# Patient Record
Sex: FEMALE | Race: WHITE | NOT HISPANIC OR LATINO | Employment: FULL TIME | URBAN - NONMETROPOLITAN AREA
[De-identification: names, ages, dates, MRNs, and addresses within clinical notes are randomized per-mention and may not be internally consistent; named-entity substitution may affect disease eponyms.]

---

## 2017-01-18 ENCOUNTER — LAB REQUISITION (OUTPATIENT)
Dept: LAB | Facility: HOSPITAL | Age: 57
End: 2017-01-18

## 2017-01-18 DIAGNOSIS — Z12.4 ENCOUNTER FOR SCREENING FOR MALIGNANT NEOPLASM OF CERVIX: ICD-10-CM

## 2017-01-18 PROCEDURE — G0123 SCREEN CERV/VAG THIN LAYER: HCPCS | Performed by: INTERNAL MEDICINE

## 2017-01-18 PROCEDURE — 87624 HPV HI-RISK TYP POOLED RSLT: CPT | Performed by: INTERNAL MEDICINE

## 2017-01-25 LAB
GEN CATEG CVX/VAG CYTO-IMP: NORMAL
LAB AP CASE REPORT: NORMAL
LAB AP GYN ADDITIONAL INFORMATION: NORMAL
Lab: NORMAL
PATH INTERP SPEC-IMP: NORMAL
STAT OF ADQ CVX/VAG CYTO-IMP: NORMAL

## 2017-03-07 ENCOUNTER — OFFICE VISIT (OUTPATIENT)
Dept: GASTROENTEROLOGY | Facility: CLINIC | Age: 57
End: 2017-03-07

## 2017-03-07 VITALS
WEIGHT: 168 LBS | HEART RATE: 97 BPM | DIASTOLIC BLOOD PRESSURE: 70 MMHG | TEMPERATURE: 98.2 F | SYSTOLIC BLOOD PRESSURE: 102 MMHG | OXYGEN SATURATION: 98 % | BODY MASS INDEX: 27.99 KG/M2 | HEIGHT: 65 IN

## 2017-03-07 DIAGNOSIS — R13.19 OTHER DYSPHAGIA: Primary | ICD-10-CM

## 2017-03-07 DIAGNOSIS — K30 INDIGESTION: ICD-10-CM

## 2017-03-07 PROBLEM — R13.10 DYSPHAGIA: Status: ACTIVE | Noted: 2017-03-07

## 2017-03-07 PROCEDURE — 99214 OFFICE O/P EST MOD 30 MIN: CPT | Performed by: NURSE PRACTITIONER

## 2017-03-07 RX ORDER — PANTOPRAZOLE SODIUM 40 MG/1
40 TABLET, DELAYED RELEASE ORAL DAILY
Qty: 30 TABLET | Refills: 11 | Status: SHIPPED | OUTPATIENT
Start: 2017-03-07 | End: 2018-03-19 | Stop reason: SDUPTHER

## 2017-03-07 RX ORDER — FLUOXETINE HYDROCHLORIDE 40 MG/1
40 CAPSULE ORAL DAILY
COMMUNITY

## 2017-03-07 NOTE — PROGRESS NOTES
Chief Complaint:   Chief Complaint   Patient presents with   • Difficulty Swallowing     Patient us here today with difficulty swallowing.         Patient ID: Carly Burkett is a 56 y.o. female     History of Present Illness:    This is a very pleasant 56-year-old female who comes today with referral by Dr. Nydia Steve with complaints of difficulty swallowing.  The patient states that this problem has been occurring off and on for over a year however it seems to have worsened recently.  She states that it occurs with any type of food as well as liquids.  She states there are no relieving factors other than to wait for the food or liquid to pass downward.  She states that she cannot say that there are any other exacerbating factors.  She does state that she has recently begun to have indigestion.  She states that she feels some burning sensation up into her esophagus and takes over-the-counter Tums for relief.  She states that there are no exacerbating factors such as food or liquid in this condition can occur at any given time.    The patient denies any nausea, vomiting, epigastric pain, hematemesis or coffee ground like vomitus.  She denies any fever or chills.  She denies any unintentional weight loss or loss of appetite.  She denies any bright red blood per rectum or black tarry stools.  She denies any nausea or vomiting.        Past Medical History   Diagnosis Date   • Anxiety    • GERD (gastroesophageal reflux disease)    • Hyperlipidemia    • Hypertension        Past Surgical History   Procedure Laterality Date   • Tonsillectomy     • Breast biopsy     • Oophorectomy     • Colonoscopy  05/14/2015         Current Outpatient Prescriptions:   •  aspirin 81 MG EC tablet, Take 81 mg by mouth Daily., Disp: , Rfl:   •  cholecalciferol (VITAMIN D3) 1000 UNITS tablet, Take 1,000 Units by mouth Daily., Disp: , Rfl:   •  FLUoxetine (PROzac) 40 MG capsule, Take 40 mg by mouth Daily., Disp: , Rfl:   •   "HYDROcodone-acetaminophen (NORCO) 7.5-325 MG per tablet, Take 1 tablet by mouth Every 6 (Six) Hours As Needed for moderate pain (4-6)., Disp: , Rfl:   •  metoprolol succinate XL (TOPROL-XL) 50 MG 24 hr tablet, Take 50 mg by mouth Daily., Disp: , Rfl:   •  simvastatin (ZOCOR) 40 MG tablet, Take 40 mg by mouth Every Night., Disp: , Rfl:   •  triamterene-hydrochlorothiazide (DYAZIDE) 37.5-25 MG per capsule, Take 1 capsule by mouth Every Morning., Disp: , Rfl:   •  zolpidem (AMBIEN) 10 MG tablet, Take 10 mg by mouth At Night As Needed for sleep., Disp: , Rfl:   •  pantoprazole (PROTONIX) 40 MG EC tablet, Take 1 tablet by mouth Daily., Disp: 30 tablet, Rfl: 11    Allergies   Allergen Reactions   • Morphine And Related        Social History     Social History   • Marital status: Single     Spouse name: N/A   • Number of children: N/A   • Years of education: N/A     Occupational History   • Not on file.     Social History Main Topics   • Smoking status: Never Smoker   • Smokeless tobacco: Not on file   • Alcohol use Yes   • Drug use: Not on file   • Sexual activity: Not on file     Other Topics Concern   • Not on file     Social History Narrative       Family History   Problem Relation Age of Onset   • Rectal cancer Sister        Vitals:    03/07/17 1259   BP: 102/70   Pulse: 97   Temp: 98.2 °F (36.8 °C)   SpO2: 98%   Weight: 168 lb (76.2 kg)   Height: 65\" (165.1 cm)       Review of Systems:    General:    Present -feeling well   Skin:    Not Present-Rash   HEENT:     Not Present-Acute visual changes or Acute hearing changes   Neck :    Not Present- swollen glands   Genitourinary:      Not Present- burning, frequency, urgency hematuria, dysuria,   Cardiovascular:   Not Present-chest pain, palpitations, or pressure   Respiratory:   Not Present- shortness of breath or cough   Gastrointestinal:  Musculoskeletal:  Neurological:  Psychiatric:   Present as mentioned in the HP    Not Present. Recent gait disturbances.    Not " Present-Seizures and weakness in extremities.    Not Present- Anxiety or Depression.       Physical Exam:    General Appearance:    Alert, cooperative, in no acute distress   Psych:    Mood appropriate    Eyes:          conjunctivae and sclerae normal, no   icterus, no pallor   ENMT:    Ears appear intact with no abnormalities noted oral mucosa moist   Neck:   No adenopathy, supple, trachea midline, no thyromegaly, no   carotid bruit, no JVD    Cardiovascular:    Regular rhythm and normal rate, normal S1 and S2, no            murmur, no gallop, no rub, no click   Gastrointestinal:     Inspection normal.  Normal bowel sounds, no masses, no organomegaly, soft round non-tender, non-distended, no guarding, no rebound or tenderness. No hepatosplenomegaly.   Skin:   No bleeding, bruising or rash   Neurologic:   nonfocal       No results found for: WBC, HGB, HCT, PLT     No results found for: NA, K, CL, CO2, BUN, CREATININE, BILITOT, ALKPHOS, ALT, AST, GLUCOSE    No results found for: INR    Assessment and Plan:    Carly was seen today for difficulty swallowing.    Diagnoses and all orders for this visit:    Other dysphagia  -     Case request endoscopy    Indigestion  -     Case request    Other orders  -     pantoprazole (PROTONIX) 40 MG EC tablet; Take 1 tablet by mouth Daily.    I have scheduled the patient for an endoscopy and I will initiate Protonix 40 mg once a day.    There are no Patient Instructions on file for this visit.    Next follow-up appointment      The risks, benefits, and alternatives of endoscopy were reviewed with the patient today.  Risks including perforation, with or without dilation, possibly requiring surgery.  Additional risks include risk of bleeding.  There is also the risk of a drug reaction or problems with anesthesia.  This will be discussed with the further by the anesthesia team on the day of the procedure. The benefits include the diagnosis and management of disease of the upper  digestive tract.  Alternatives to endoscopy include upper GI series, laboratory testing, radiographic evaluation, or no intervention.  The patient verbalizes understanding and agrees.      EMR Dragon/Transcription disclaimer:  Much of this encounter note is an electronic transcription/translation of spoken language to printed text. The electronic translation of spoken language may permit erroneous, or at times, nonsensical words or phrases to be inadvertently transcribed; although I have reviewed the note for such errors, some may still exist.

## 2017-04-10 ENCOUNTER — TELEPHONE (OUTPATIENT)
Dept: GASTROENTEROLOGY | Facility: CLINIC | Age: 57
End: 2017-04-10

## 2017-07-17 RX ORDER — HYDROCODONE BITARTRATE AND ACETAMINOPHEN 7.5; 325 MG/1; MG/1
1 TABLET ORAL DAILY PRN
Qty: 30 TABLET | Refills: 0 | Status: SHIPPED | OUTPATIENT
Start: 2017-07-17 | End: 2017-08-16

## 2017-07-17 RX ORDER — ZOLPIDEM TARTRATE 10 MG/1
10 TABLET ORAL NIGHTLY PRN
Qty: 30 TABLET | Refills: 0 | Status: SHIPPED | OUTPATIENT
Start: 2017-07-17 | End: 2017-07-31

## 2017-08-07 RX ORDER — TRIAMTERENE AND HYDROCHLOROTHIAZIDE 37.5; 25 MG/1; MG/1
CAPSULE ORAL
Qty: 90 CAPSULE | Refills: 2 | Status: SHIPPED | OUTPATIENT
Start: 2017-08-07 | End: 2017-08-08 | Stop reason: SDUPTHER

## 2017-08-08 RX ORDER — TRIAMTERENE AND HYDROCHLOROTHIAZIDE 37.5; 25 MG/1; MG/1
CAPSULE ORAL
Qty: 90 CAPSULE | Refills: 2 | Status: SHIPPED | OUTPATIENT
Start: 2017-08-08 | End: 2018-08-01

## 2017-09-10 PROBLEM — Z12.4 PAP SMEAR FOR CERVICAL CANCER SCREENING: Status: ACTIVE | Noted: 2017-09-10

## 2017-09-10 PROBLEM — M81.6 LOCALIZED OSTEOPOROSIS WITHOUT CURRENT PATHOLOGICAL FRACTURE: Status: ACTIVE | Noted: 2017-09-10

## 2017-09-10 PROBLEM — E55.9 VITAMIN D DEFICIENCY: Status: ACTIVE | Noted: 2017-09-10

## 2017-09-10 PROBLEM — F51.01 PRIMARY INSOMNIA: Status: ACTIVE | Noted: 2017-09-10

## 2017-09-10 PROBLEM — E78.00 PURE HYPERCHOLESTEROLEMIA: Status: ACTIVE | Noted: 2017-09-10

## 2017-09-10 PROBLEM — Z12.12 SCREENING FOR COLORECTAL CANCER: Status: ACTIVE | Noted: 2017-09-10

## 2017-09-10 PROBLEM — M06.9 RA (RHEUMATOID ARTHRITIS) (HCC): Status: ACTIVE | Noted: 2017-09-10

## 2017-09-10 PROBLEM — F32.9 REACTIVE DEPRESSION: Status: ACTIVE | Noted: 2017-09-10

## 2017-09-10 PROBLEM — Z12.11 SCREENING FOR COLORECTAL CANCER: Status: ACTIVE | Noted: 2017-09-10

## 2017-09-10 PROBLEM — F41.1 GENERALIZED ANXIETY DISORDER: Status: ACTIVE | Noted: 2017-09-10

## 2017-09-10 PROBLEM — I10 ESSENTIAL HYPERTENSION: Status: ACTIVE | Noted: 2017-09-10

## 2017-09-12 DIAGNOSIS — E55.9 VITAMIN D DEFICIENCY: ICD-10-CM

## 2017-09-12 DIAGNOSIS — I10 ESSENTIAL HYPERTENSION: ICD-10-CM

## 2017-09-12 DIAGNOSIS — E78.00 PURE HYPERCHOLESTEROLEMIA: Primary | ICD-10-CM

## 2017-09-20 RX ORDER — CHOLECALCIFEROL (VITAMIN D3) 1250 MCG
1 CAPSULE ORAL WEEKLY
COMMUNITY
End: 2018-06-13 | Stop reason: CLARIF

## 2017-09-20 RX ORDER — FLUOXETINE HYDROCHLORIDE 40 MG/1
CAPSULE ORAL
COMMUNITY
Start: 2017-06-25 | End: 2018-01-22 | Stop reason: SDUPTHER

## 2017-09-20 RX ORDER — ZOLPIDEM TARTRATE 10 MG/1
TABLET ORAL NIGHTLY PRN
COMMUNITY
End: 2018-08-01 | Stop reason: SDUPTHER

## 2017-09-20 RX ORDER — METOPROLOL SUCCINATE 50 MG/1
50 TABLET, EXTENDED RELEASE ORAL DAILY
COMMUNITY
Start: 2017-09-08 | End: 2018-01-25 | Stop reason: SDUPTHER

## 2017-09-21 ENCOUNTER — OFFICE VISIT (OUTPATIENT)
Dept: INTERNAL MEDICINE | Age: 57
End: 2017-09-21
Payer: COMMERCIAL

## 2017-09-21 VITALS
OXYGEN SATURATION: 97 % | HEART RATE: 63 BPM | DIASTOLIC BLOOD PRESSURE: 82 MMHG | WEIGHT: 174 LBS | SYSTOLIC BLOOD PRESSURE: 132 MMHG | HEIGHT: 65 IN | BODY MASS INDEX: 28.99 KG/M2

## 2017-09-21 DIAGNOSIS — Z11.59 ENCOUNTER FOR HEPATITIS C SCREENING TEST FOR LOW RISK PATIENT: ICD-10-CM

## 2017-09-21 DIAGNOSIS — I10 ESSENTIAL HYPERTENSION: ICD-10-CM

## 2017-09-21 DIAGNOSIS — E55.9 VITAMIN D DEFICIENCY: ICD-10-CM

## 2017-09-21 DIAGNOSIS — F41.1 GENERALIZED ANXIETY DISORDER: ICD-10-CM

## 2017-09-21 DIAGNOSIS — E78.00 PURE HYPERCHOLESTEROLEMIA: ICD-10-CM

## 2017-09-21 DIAGNOSIS — I10 ESSENTIAL HYPERTENSION: Primary | ICD-10-CM

## 2017-09-21 DIAGNOSIS — Z11.4 SCREENING FOR HIV (HUMAN IMMUNODEFICIENCY VIRUS): ICD-10-CM

## 2017-09-21 DIAGNOSIS — H53.9 VISION CHANGES: ICD-10-CM

## 2017-09-21 DIAGNOSIS — R51.9 RIGHT TEMPORAL HEADACHE: ICD-10-CM

## 2017-09-21 LAB
ALBUMIN SERPL-MCNC: 4.3 G/DL (ref 3.5–5.2)
ALP BLD-CCNC: 117 U/L (ref 35–104)
ALT SERPL-CCNC: 30 U/L (ref 5–33)
ANION GAP SERPL CALCULATED.3IONS-SCNC: 13 MMOL/L (ref 7–19)
AST SERPL-CCNC: 32 U/L (ref 5–32)
BILIRUB SERPL-MCNC: 0.9 MG/DL (ref 0.2–1.2)
BUN BLDV-MCNC: 21 MG/DL (ref 6–20)
CALCIUM SERPL-MCNC: 9.5 MG/DL (ref 8.6–10)
CHLORIDE BLD-SCNC: 103 MMOL/L (ref 98–111)
CHOLESTEROL, TOTAL: 204 MG/DL (ref 160–199)
CO2: 25 MMOL/L (ref 22–29)
CREAT SERPL-MCNC: 0.8 MG/DL (ref 0.5–0.9)
GFR NON-AFRICAN AMERICAN: >60
GLUCOSE BLD-MCNC: 89 MG/DL (ref 74–109)
HCT VFR BLD CALC: 43.9 % (ref 37–47)
HDLC SERPL-MCNC: 59 MG/DL (ref 65–121)
HEMOGLOBIN: 14 G/DL (ref 12–16)
LDL CHOLESTEROL CALCULATED: 122 MG/DL
MCH RBC QN AUTO: 27.6 PG (ref 27–31)
MCHC RBC AUTO-ENTMCNC: 31.9 G/DL (ref 33–37)
MCV RBC AUTO: 86.4 FL (ref 81–99)
PDW BLD-RTO: 14.2 % (ref 11.5–14.5)
PLATELET # BLD: 276 K/UL (ref 130–400)
PMV BLD AUTO: 9.5 FL (ref 9.4–12.3)
POTASSIUM SERPL-SCNC: 4.1 MMOL/L (ref 3.5–5)
RAPID HIV 1&2: NORMAL
RBC # BLD: 5.08 M/UL (ref 4.2–5.4)
SODIUM BLD-SCNC: 141 MMOL/L (ref 136–145)
TOTAL PROTEIN: 7.2 G/DL (ref 6.6–8.7)
TRIGL SERPL-MCNC: 113 MG/DL (ref 150–199)
WBC # BLD: 6.5 K/UL (ref 4.8–10.8)

## 2017-09-21 PROCEDURE — 99214 OFFICE O/P EST MOD 30 MIN: CPT | Performed by: INTERNAL MEDICINE

## 2017-09-21 RX ORDER — PANTOPRAZOLE SODIUM 40 MG/1
40 TABLET, DELAYED RELEASE ORAL
COMMUNITY
Start: 2017-03-07 | End: 2018-12-28 | Stop reason: SDUPTHER

## 2017-09-21 ASSESSMENT — PATIENT HEALTH QUESTIONNAIRE - PHQ9
2. FEELING DOWN, DEPRESSED OR HOPELESS: 0
1. LITTLE INTEREST OR PLEASURE IN DOING THINGS: 0
SUM OF ALL RESPONSES TO PHQ9 QUESTIONS 1 & 2: 0
SUM OF ALL RESPONSES TO PHQ QUESTIONS 1-9: 0

## 2017-09-21 ASSESSMENT — ENCOUNTER SYMPTOMS
WHEEZING: 0
CHEST TIGHTNESS: 0
ABDOMINAL PAIN: 0
SORE THROAT: 0
COUGH: 0
CONSTIPATION: 0

## 2017-09-22 LAB — HEPATITIS C ANTIBODY INTERPRETATION: NORMAL

## 2017-09-25 LAB
VITAMIN D2 AND D3, TOTAL: 44.3 NG/ML (ref 30–80)
VITAMIN D2, 25 HYDROXY: 31.6 NG/ML
VITAMIN D3,25 HYDROXY: 12.7 NG/ML

## 2017-10-02 ENCOUNTER — HOSPITAL ENCOUNTER (OUTPATIENT)
Dept: MRI IMAGING | Age: 57
Discharge: HOME | End: 2017-10-02
Payer: COMMERCIAL

## 2017-10-02 DIAGNOSIS — H53.9 VISION CHANGES: ICD-10-CM

## 2017-10-02 DIAGNOSIS — R51.9 RIGHT TEMPORAL HEADACHE: ICD-10-CM

## 2017-10-02 PROCEDURE — 6360000004 HC RX CONTRAST MEDICATION: Performed by: INTERNAL MEDICINE

## 2017-10-02 PROCEDURE — 70553 MRI BRAIN STEM W/O & W/DYE: CPT

## 2017-10-02 PROCEDURE — A9579 GAD-BASE MR CONTRAST NOS,1ML: HCPCS | Performed by: INTERNAL MEDICINE

## 2017-10-02 RX ADMIN — GADOPENTETATE DIMEGLUMINE 7 ML: 469.01 INJECTION INTRAVENOUS at 17:03

## 2017-10-02 RX ADMIN — GADOPENTETATE DIMEGLUMINE 10 ML: 469.01 INJECTION INTRAVENOUS at 17:03

## 2017-10-09 ENCOUNTER — TELEPHONE (OUTPATIENT)
Dept: INTERNAL MEDICINE | Age: 57
End: 2017-10-09

## 2017-10-09 DIAGNOSIS — R51.9 RIGHT TEMPORAL HEADACHE: Primary | ICD-10-CM

## 2017-10-09 DIAGNOSIS — I99.8 ISCHEMIA: ICD-10-CM

## 2017-10-09 NOTE — TELEPHONE ENCOUNTER
----- Message from Dario James MD sent at 10/3/2017  8:51 PM CDT -----  When I saw patient about a week ago she was complaining of new onset temporal headaches over the last several months. We ordered MRI, results as follows:  Early chronic small  vessel ischemia or possible very minimal brain changes related to migraine headaches  Since patient definitely has new onset headaches I feel we need to get consultation from neurology, and opinion.   Suggest to make appointment for her to see Dr. Riccardo Abdul for an opinion

## 2017-10-10 ENCOUNTER — TELEPHONE (OUTPATIENT)
Dept: NEUROSURGERY | Age: 57
End: 2017-10-10

## 2017-11-06 RX ORDER — HYDROCODONE BITARTRATE AND ACETAMINOPHEN 7.5; 325 MG/1; MG/1
1 TABLET ORAL EVERY 24 HOURS
Qty: 20 TABLET | Refills: 0 | Status: SHIPPED | OUTPATIENT
Start: 2017-11-06 | End: 2017-11-13

## 2017-11-06 NOTE — TELEPHONE ENCOUNTER
Patient needs refill on   Requested Prescriptions     Pending Prescriptions Disp Refills    HYDROcodone-acetaminophen (NORCO) 7.5-325 MG per tablet 30 tablet 0     Sig: Take 1 tablet by mouth every 24 hours for 7 days .      I have scanned in the [facility] as well for this medication

## 2017-12-11 ENCOUNTER — OFFICE VISIT (OUTPATIENT)
Dept: NEUROLOGY | Age: 57
End: 2017-12-11
Payer: COMMERCIAL

## 2017-12-11 VITALS
BODY MASS INDEX: 29.32 KG/M2 | DIASTOLIC BLOOD PRESSURE: 82 MMHG | SYSTOLIC BLOOD PRESSURE: 124 MMHG | HEIGHT: 65 IN | WEIGHT: 176 LBS

## 2017-12-11 DIAGNOSIS — Z86.79 HISTORY OF HYPERTENSION: ICD-10-CM

## 2017-12-11 DIAGNOSIS — G25.0 ESSENTIAL TREMOR: ICD-10-CM

## 2017-12-11 DIAGNOSIS — G44.89 OTHER HEADACHE SYNDROME: ICD-10-CM

## 2017-12-11 DIAGNOSIS — Z86.39 HISTORY OF VITAMIN D DEFICIENCY: ICD-10-CM

## 2017-12-11 DIAGNOSIS — G44.89 OTHER HEADACHE SYNDROME: Primary | ICD-10-CM

## 2017-12-11 DIAGNOSIS — R90.89 ABNORMAL FINDING ON MRI OF BRAIN: ICD-10-CM

## 2017-12-11 LAB
C-REACTIVE PROTEIN: 0.41 MG/DL (ref 0–0.5)
SEDIMENTATION RATE, ERYTHROCYTE: 10 MM/HR (ref 0–25)
T4 FREE: 1.3 NG/DL (ref 0.9–1.7)

## 2017-12-11 PROCEDURE — 99204 OFFICE O/P NEW MOD 45 MIN: CPT | Performed by: PSYCHIATRY & NEUROLOGY

## 2017-12-11 RX ORDER — ACETAMINOPHEN 160 MG
TABLET,DISINTEGRATING ORAL
COMMUNITY

## 2017-12-11 RX ORDER — NAPROXEN 500 MG/1
TABLET ORAL
Qty: 40 TABLET | Refills: 3 | Status: SHIPPED | OUTPATIENT
Start: 2017-12-11 | End: 2018-08-01

## 2017-12-11 RX ORDER — ORPHENADRINE CITRATE 100 MG/1
100 TABLET, EXTENDED RELEASE ORAL EVERY EVENING
Qty: 30 TABLET | Refills: 0 | Status: SHIPPED | OUTPATIENT
Start: 2017-12-11 | End: 2018-08-01 | Stop reason: SDUPTHER

## 2017-12-11 RX ORDER — HYDROCODONE BITARTRATE AND ACETAMINOPHEN 7.5; 325 MG/1; MG/1
1 TABLET ORAL DAILY PRN
COMMUNITY
End: 2018-08-01 | Stop reason: SDUPTHER

## 2017-12-12 NOTE — PROGRESS NOTES
Chief Complaint   Patient presents with    New Patient     i was referred by dr Preeti Lebron for an abnormal mri i had done here and because of my headaches       Luiza Aguirre is a 61y.o. year old female who is seen for evaluation of Headaches and abnormal MRI of the head. For the past 6 months she has been complaining of intermittent sharp stabbing pains in the right parietal region. It only lasts for seconds and occurs about 2 or 3 times a week on average. At times this pain is severe. She drinks about 24 ounces of caffeine per day. She had a recent MRI of the head in October which is reviewed today with the patient. There are 1-2 right sided subcortical areas of increased signal. She does have a history of hypertension and hypercholesterolemia. She otherwise denies any focal neurological signs or symptoms. No obvious exacerbating or alleviating factors with her headache. Old records are reviewed in detail. We had an extended discussion regarding all the above. She does have a history of vitamin D deficiency and rheumatoid arthritis as well.     Active Ambulatory Problems     Diagnosis Date Noted    Screening for colorectal cancer 09/10/2017    Pure hypercholesterolemia 09/10/2017    Vitamin D deficiency 09/10/2017    Essential hypertension 09/10/2017    Pap smear for cervical cancer screening 09/10/2017    Localized osteoporosis without current pathological fracture 09/10/2017    RA (rheumatoid arthritis) ([facility] [address] 09/10/2017    Primary insomnia 09/10/2017    Generalized anxiety disorder 09/10/2017    Reactive depression 09/10/2017    Right temporal headache 09/21/2017    Vision changes 09/21/2017     Resolved Ambulatory Problems     Diagnosis Date Noted    No Resolved Ambulatory Problems     Past Medical History:   Diagnosis Date    Depression     GERD (gastroesophageal reflux disease)     Headache     Hyperlipidemia     Hypertension     Insomnia     Vitamin D deficiency        Past Surgical Twice daily with food for 2 weeks 40 tablet 3    pantoprazole (PROTONIX) 40 MG tablet Take 40 mg by mouth      Cholecalciferol (VITAMIN D3) 26035 units CAPS Take 1 capsule by mouth once a week      metoprolol succinate (TOPROL XL) 50 MG extended release tablet Take 50 mg by mouth daily       FLUoxetine (PROZAC) 40 MG capsule       zolpidem (AMBIEN) 10 MG tablet Take by mouth nightly as needed for Sleep      triamterene-hydrochlorothiazide (DYAZIDE) 37.5-25 MG per capsule TAKE ONE CAPSULE DAILY 90 capsule 2    aspirin 81 MG chewable tablet Take 81 mg by mouth daily      simvastatin (ZOCOR) 40 MG tablet Take 40 mg by mouth nightly         /82   Ht 5' 5\" (1.651 m)   Wt 176 lb (79.8 kg)   BMI 29.29 kg/m²       Constitutional  well developed, well nourished. Eyes  conjunctiva normal.  Pupils react to light  Ear, nose, throat -hearing intact to finger rub No scars, masses, or lesions over external nose or ears, no atrophy of tongue  Neck-symmetric, no masses noted, no jugular vein distension. No bruits noted. Decreased range of motion  Respiration- chest wall appears symmetric, good expansion,   normal effort without use of accessory muscles  Cardiovascular- RRR  Musculoskeletal  no significant wasting of muscles noted, no bony deformities, gait no gross ataxia  Extremities-no clubbing, cyanosis or edema  Skin  warm, dry, and intact. No rash, erythema, or pallor. Psychiatric  mood, affect, and behavior appear normal.      Neurological exam  Awake, alert, fluent oriented x 3 appropriate affect  Attention and concentration appear appropriate  Recent and remote memory appears unremarkable  Speech normal without dysarthria  No clear issues with language of fund of knowledge    Cranial Nerve Exam   CN II- Visual fields grossly unremarkable. VA adequate.  Discs sharp  CN III, IV,VI- PERRLA, EOMI, No nystagmus, conjugate eye movements, no ptosis  CN V-sensation intact to LT over face  CN VII-no facial

## 2017-12-22 RX ORDER — ERGOCALCIFEROL 1.25 MG/1
CAPSULE ORAL
Qty: 4 CAPSULE | Refills: 5 | Status: SHIPPED | OUTPATIENT
Start: 2017-12-22 | End: 2018-12-06 | Stop reason: SDUPTHER

## 2018-01-23 RX ORDER — FLUOXETINE HYDROCHLORIDE 40 MG/1
CAPSULE ORAL
Qty: 90 CAPSULE | Refills: 0 | Status: SHIPPED | OUTPATIENT
Start: 2018-01-23 | End: 2018-05-23 | Stop reason: SDUPTHER

## 2018-01-26 RX ORDER — METOPROLOL SUCCINATE 50 MG/1
TABLET, EXTENDED RELEASE ORAL
Qty: 90 TABLET | Refills: 3 | Status: SHIPPED | OUTPATIENT
Start: 2018-01-26 | End: 2019-02-05 | Stop reason: SDUPTHER

## 2018-01-26 RX ORDER — SIMVASTATIN 40 MG
TABLET ORAL
Qty: 90 TABLET | Refills: 3 | Status: SHIPPED | OUTPATIENT
Start: 2018-01-26 | End: 2018-08-01

## 2018-03-19 RX ORDER — PANTOPRAZOLE SODIUM 40 MG/1
40 TABLET, DELAYED RELEASE ORAL DAILY
Qty: 30 TABLET | Refills: 3 | Status: SHIPPED | OUTPATIENT
Start: 2018-03-19

## 2018-05-23 RX ORDER — FLUOXETINE HYDROCHLORIDE 40 MG/1
CAPSULE ORAL
Qty: 90 CAPSULE | Refills: 0 | Status: SHIPPED | OUTPATIENT
Start: 2018-05-23 | End: 2018-08-26 | Stop reason: SDUPTHER

## 2018-06-13 ENCOUNTER — OFFICE VISIT (OUTPATIENT)
Dept: INTERNAL MEDICINE | Age: 58
End: 2018-06-13
Payer: COMMERCIAL

## 2018-06-13 VITALS
BODY MASS INDEX: 29.32 KG/M2 | SYSTOLIC BLOOD PRESSURE: 134 MMHG | OXYGEN SATURATION: 96 % | WEIGHT: 176 LBS | TEMPERATURE: 98.6 F | DIASTOLIC BLOOD PRESSURE: 82 MMHG | HEIGHT: 65 IN | HEART RATE: 87 BPM

## 2018-06-13 DIAGNOSIS — K08.89 PAIN, DENTAL: ICD-10-CM

## 2018-06-13 DIAGNOSIS — J02.9 PHARYNGITIS, UNSPECIFIED ETIOLOGY: Primary | ICD-10-CM

## 2018-06-13 DIAGNOSIS — H92.01 ACUTE OTALGIA, RIGHT: ICD-10-CM

## 2018-06-13 PROBLEM — K08.9 CHRONIC DENTAL PAIN: Status: ACTIVE | Noted: 2018-06-13

## 2018-06-13 PROBLEM — G89.29 CHRONIC DENTAL PAIN: Status: ACTIVE | Noted: 2018-06-13

## 2018-06-13 PROCEDURE — 99214 OFFICE O/P EST MOD 30 MIN: CPT | Performed by: NURSE PRACTITIONER

## 2018-06-13 RX ORDER — AMOXICILLIN AND CLAVULANATE POTASSIUM 875; 125 MG/1; MG/1
1 TABLET, FILM COATED ORAL 2 TIMES DAILY
Qty: 20 TABLET | Refills: 0 | Status: SHIPPED | OUTPATIENT
Start: 2018-06-13 | End: 2018-08-01 | Stop reason: CLARIF

## 2018-06-13 ASSESSMENT — ENCOUNTER SYMPTOMS
SORE THROAT: 1
BACK PAIN: 0
RHINORRHEA: 0
COUGH: 0
PHOTOPHOBIA: 0
VOMITING: 0
COLOR CHANGE: 0
SHORTNESS OF BREATH: 0
NAUSEA: 0
VOICE CHANGE: 0

## 2018-07-31 DIAGNOSIS — E78.00 PURE HYPERCHOLESTEROLEMIA: ICD-10-CM

## 2018-07-31 DIAGNOSIS — E55.9 VITAMIN D DEFICIENCY: ICD-10-CM

## 2018-07-31 DIAGNOSIS — I10 ESSENTIAL HYPERTENSION: ICD-10-CM

## 2018-07-31 LAB
ALBUMIN SERPL-MCNC: 4.1 G/DL (ref 3.5–5.2)
ALP BLD-CCNC: 106 U/L (ref 35–104)
ALT SERPL-CCNC: 23 U/L (ref 5–33)
ANION GAP SERPL CALCULATED.3IONS-SCNC: 14 MMOL/L (ref 7–19)
AST SERPL-CCNC: 25 U/L (ref 5–32)
BACTERIA: NEGATIVE /HPF
BASOPHILS ABSOLUTE: 0 K/UL (ref 0–0.2)
BASOPHILS RELATIVE PERCENT: 0.7 % (ref 0–1)
BILIRUB SERPL-MCNC: 0.7 MG/DL (ref 0.2–1.2)
BILIRUBIN URINE: NEGATIVE
BLOOD, URINE: ABNORMAL
BUN BLDV-MCNC: 16 MG/DL (ref 6–20)
CALCIUM SERPL-MCNC: 9.3 MG/DL (ref 8.6–10)
CHLORIDE BLD-SCNC: 101 MMOL/L (ref 98–111)
CHOLESTEROL, TOTAL: 229 MG/DL (ref 160–199)
CLARITY: CLEAR
CO2: 24 MMOL/L (ref 22–29)
COLOR: YELLOW
CREAT SERPL-MCNC: 0.8 MG/DL (ref 0.5–0.9)
EOSINOPHILS ABSOLUTE: 0.3 K/UL (ref 0–0.6)
EOSINOPHILS RELATIVE PERCENT: 5.6 % (ref 0–5)
EPITHELIAL CELLS, UA: 0 /HPF (ref 0–5)
GFR NON-AFRICAN AMERICAN: >60
GLUCOSE BLD-MCNC: 88 MG/DL (ref 74–109)
GLUCOSE URINE: NEGATIVE MG/DL
HCT VFR BLD CALC: 40.5 % (ref 37–47)
HDLC SERPL-MCNC: 67 MG/DL (ref 65–121)
HEMOGLOBIN: 12.9 G/DL (ref 12–16)
HYALINE CASTS: 0 /HPF (ref 0–8)
KETONES, URINE: NEGATIVE MG/DL
LDL CHOLESTEROL CALCULATED: 135 MG/DL
LEUKOCYTE ESTERASE, URINE: NEGATIVE
LYMPHOCYTES ABSOLUTE: 1.6 K/UL (ref 1.1–4.5)
LYMPHOCYTES RELATIVE PERCENT: 26.6 % (ref 20–40)
MCH RBC QN AUTO: 26.9 PG (ref 27–31)
MCHC RBC AUTO-ENTMCNC: 31.9 G/DL (ref 33–37)
MCV RBC AUTO: 84.4 FL (ref 81–99)
MONOCYTES ABSOLUTE: 0.5 K/UL (ref 0–0.9)
MONOCYTES RELATIVE PERCENT: 7.8 % (ref 0–10)
NEUTROPHILS ABSOLUTE: 3.5 K/UL (ref 1.5–7.5)
NEUTROPHILS RELATIVE PERCENT: 59 % (ref 50–65)
NITRITE, URINE: NEGATIVE
PDW BLD-RTO: 14.2 % (ref 11.5–14.5)
PH UA: 5.5
PLATELET # BLD: 219 K/UL (ref 130–400)
PMV BLD AUTO: 9.2 FL (ref 9.4–12.3)
POTASSIUM SERPL-SCNC: 3.8 MMOL/L (ref 3.5–5)
PROTEIN UA: NEGATIVE MG/DL
RBC # BLD: 4.8 M/UL (ref 4.2–5.4)
RBC UA: 1 /HPF (ref 0–4)
SODIUM BLD-SCNC: 139 MMOL/L (ref 136–145)
SPECIFIC GRAVITY UA: 1.02
TOTAL PROTEIN: 6.7 G/DL (ref 6.6–8.7)
TRIGL SERPL-MCNC: 133 MG/DL (ref 0–149)
TSH SERPL DL<=0.05 MIU/L-ACNC: 1.88 UIU/ML (ref 0.27–4.2)
UROBILINOGEN, URINE: 0.2 E.U./DL
VITAMIN D 25-HYDROXY: 42.5 NG/ML
WBC # BLD: 5.9 K/UL (ref 4.8–10.8)
WBC UA: 0 /HPF (ref 0–5)

## 2018-08-01 ENCOUNTER — OFFICE VISIT (OUTPATIENT)
Dept: INTERNAL MEDICINE | Age: 58
End: 2018-08-01
Payer: COMMERCIAL

## 2018-08-01 VITALS
SYSTOLIC BLOOD PRESSURE: 130 MMHG | BODY MASS INDEX: 28.82 KG/M2 | WEIGHT: 173 LBS | OXYGEN SATURATION: 98 % | DIASTOLIC BLOOD PRESSURE: 86 MMHG | HEIGHT: 65 IN | HEART RATE: 63 BPM

## 2018-08-01 DIAGNOSIS — L98.9 FACIAL SKIN LESION: ICD-10-CM

## 2018-08-01 DIAGNOSIS — E78.00 PURE HYPERCHOLESTEROLEMIA: ICD-10-CM

## 2018-08-01 DIAGNOSIS — G89.29 CHRONIC PAIN OF BOTH KNEES: ICD-10-CM

## 2018-08-01 DIAGNOSIS — M81.6 LOCALIZED OSTEOPOROSIS WITHOUT CURRENT PATHOLOGICAL FRACTURE: ICD-10-CM

## 2018-08-01 DIAGNOSIS — E55.9 VITAMIN D DEFICIENCY: ICD-10-CM

## 2018-08-01 DIAGNOSIS — M25.561 CHRONIC PAIN OF BOTH KNEES: ICD-10-CM

## 2018-08-01 DIAGNOSIS — F41.1 GENERALIZED ANXIETY DISORDER: ICD-10-CM

## 2018-08-01 DIAGNOSIS — I10 ESSENTIAL HYPERTENSION: ICD-10-CM

## 2018-08-01 DIAGNOSIS — M25.562 CHRONIC PAIN OF BOTH KNEES: ICD-10-CM

## 2018-08-01 DIAGNOSIS — Z12.31 VISIT FOR SCREENING MAMMOGRAM: ICD-10-CM

## 2018-08-01 DIAGNOSIS — F51.01 PRIMARY INSOMNIA: ICD-10-CM

## 2018-08-01 DIAGNOSIS — Z00.00 ANNUAL PHYSICAL EXAM: Primary | ICD-10-CM

## 2018-08-01 PROCEDURE — 99396 PREV VISIT EST AGE 40-64: CPT | Performed by: INTERNAL MEDICINE

## 2018-08-01 RX ORDER — ZOLPIDEM TARTRATE 10 MG/1
10 TABLET ORAL NIGHTLY PRN
Qty: 30 TABLET | Refills: 5 | Status: SHIPPED | OUTPATIENT
Start: 2018-08-01 | End: 2019-11-13 | Stop reason: SDUPTHER

## 2018-08-01 RX ORDER — TRIAMTERENE AND HYDROCHLOROTHIAZIDE 37.5; 25 MG/1; MG/1
1 TABLET ORAL DAILY
Qty: 30 TABLET | Refills: 5 | Status: SHIPPED | OUTPATIENT
Start: 2018-08-01 | End: 2019-08-05 | Stop reason: SDUPTHER

## 2018-08-01 RX ORDER — HYDROCODONE BITARTRATE AND ACETAMINOPHEN 7.5; 325 MG/1; MG/1
1 TABLET ORAL DAILY PRN
Qty: 30 TABLET | Refills: 0 | Status: SHIPPED | OUTPATIENT
Start: 2018-08-01 | End: 2018-11-26 | Stop reason: SDUPTHER

## 2018-08-01 RX ORDER — ORPHENADRINE CITRATE 100 MG/1
100 TABLET, EXTENDED RELEASE ORAL EVERY EVENING
Qty: 30 TABLET | Refills: 0 | Status: SHIPPED | OUTPATIENT
Start: 2018-08-01 | End: 2018-09-03 | Stop reason: SDUPTHER

## 2018-08-01 RX ORDER — ROSUVASTATIN CALCIUM 10 MG/1
10 TABLET, COATED ORAL NIGHTLY
Qty: 90 TABLET | Refills: 3 | Status: SHIPPED | OUTPATIENT
Start: 2018-08-01 | End: 2019-08-05 | Stop reason: SDUPTHER

## 2018-08-01 ASSESSMENT — ENCOUNTER SYMPTOMS
NAUSEA: 0
DIARRHEA: 0
EYE PAIN: 0
SINUS PRESSURE: 0
COUGH: 0
VOMITING: 0
BLOOD IN STOOL: 0
COLOR CHANGE: 0
EYE REDNESS: 0
SORE THROAT: 0
TROUBLE SWALLOWING: 0
VOICE CHANGE: 0
ABDOMINAL PAIN: 0
CONSTIPATION: 0
CHEST TIGHTNESS: 0
WHEEZING: 0

## 2018-08-01 NOTE — PROGRESS NOTES
History:   Procedure Laterality Date    ADENOIDECTOMY      BREAST BIOPSY      two    COLONOSCOPY      OVARY REMOVAL      right    TONSILLECTOMY         Current Outpatient Prescriptions   Medication Sig Dispense Refill    orphenadrine (NORFLEX) 100 MG extended release tablet Take 1 tablet by mouth every evening for 10 days 30 tablet 0    HYDROcodone-acetaminophen (NORCO) 7.5-325 MG per tablet Take 1 tablet by mouth daily as needed for Pain for up to 30 days. . 30 tablet 0    zolpidem (AMBIEN) 10 MG tablet Take 1 tablet by mouth nightly as needed for Sleep for up to 30 days. . 30 tablet 5    rosuvastatin (CRESTOR) 10 MG tablet Take 1 tablet by mouth nightly 90 tablet 3    triamterene-hydrochlorothiazide (MAXZIDE-25) 37.5-25 MG per tablet Take 1 tablet by mouth daily 30 tablet 5    FLUoxetine (PROZAC) 40 MG capsule TAKE ONE CAPSULE BY MOUTH EVERY DAY 90 capsule 0    metoprolol succinate (TOPROL XL) 50 MG extended release tablet TAKE 1 TABLET DAILY 90 tablet 3    vitamin D (ERGOCALCIFEROL) 39977 units CAPS capsule TAKE ONE CAPSULE BY MOUTH ONCE WEEKLY 4 capsule 5    Cholecalciferol (VITAMIN D3) 2000 units CAPS Take by mouth      pantoprazole (PROTONIX) 40 MG tablet Take 40 mg by mouth      aspirin 81 MG chewable tablet Take 81 mg by mouth daily       No current facility-administered medications for this visit.       Allergies   Allergen Reactions    Morphine And Related        Social History     Social History    Marital status:      Spouse name: N/A    Number of children: 1    Years of education: N/A     Occupational History     [employer] manager      Social History Main Topics    Smoking status: Never Smoker    Smokeless tobacco: Never Used    Alcohol use Yes    Drug use: No    Sexual activity: No     Other Topics Concern    None     Social History Narrative    None     Family History   Problem Relation Age of Onset    Lung Cancer Father     Colon Cancer Sister 62        COLON  Other Sister         PANCREATIC CANCER    Diabetes Brother     Coronary Art Dis Brother        Past Surgical History:   Procedure Laterality Date    ADENOIDECTOMY      BREAST BIOPSY      two    COLONOSCOPY      OVARY REMOVAL      right    TONSILLECTOMY           Lab Review   Orders Only on 07/31/2018   Component Date Value    Sodium 07/31/2018 139     Potassium 07/31/2018 3.8     Chloride 07/31/2018 101     CO2 07/31/2018 24     Anion Gap 07/31/2018 14     Glucose 07/31/2018 88     BUN 07/31/2018 16     CREATININE 07/31/2018 0.8     GFR Non- 07/31/2018 >60     Calcium 07/31/2018 9.3     Total Protein 07/31/2018 6.7     Alb 07/31/2018 4.1     Total Bilirubin 07/31/2018 0.7     Alkaline Phosphatase 07/31/2018 106*    ALT 07/31/2018 23     AST 07/31/2018 25     WBC 07/31/2018 5.9     RBC 07/31/2018 4.80     Hemoglobin 07/31/2018 12.9     Hematocrit 07/31/2018 40.5     MCV 07/31/2018 84.4     MCH 07/31/2018 26.9*    MCHC 07/31/2018 31.9*    RDW 07/31/2018 14.2     Platelets 03/92/9876 219     MPV 07/31/2018 9.2*    Neutrophils % 07/31/2018 59.0     Lymphocytes % 07/31/2018 26.6     Monocytes % 07/31/2018 7.8     Eosinophils % 07/31/2018 5.6*    Basophils % 07/31/2018 0.7     Neutrophils # 07/31/2018 3.5     Lymphocytes # 07/31/2018 1.6     Monocytes # 07/31/2018 0.50     Eosinophils # 07/31/2018 0.30     Basophils # 07/31/2018 0.00     Cholesterol, Total 07/31/2018 229*    Triglycerides 07/31/2018 133     HDL 07/31/2018 67     LDL Calculated 07/31/2018 135     TSH 07/31/2018 1.880     Color, UA 07/31/2018 YELLOW     Clarity, UA 07/31/2018 Clear     Glucose, Ur 07/31/2018 Negative     Bilirubin Urine 07/31/2018 Negative     Ketones, Urine 07/31/2018 Negative     Specific Gravity, UA 07/31/2018 1.017     Blood, Urine 07/31/2018 TRACE*    pH, UA 07/31/2018 5.5     Protein, UA 07/31/2018 Negative     Urobilinogen, Urine 07/31/2018 0.2    

## 2018-08-27 RX ORDER — FLUOXETINE HYDROCHLORIDE 40 MG/1
CAPSULE ORAL
Qty: 90 CAPSULE | Refills: 3 | Status: SHIPPED | OUTPATIENT
Start: 2018-08-27 | End: 2019-08-05 | Stop reason: SDUPTHER

## 2018-08-31 PROBLEM — Z12.31 VISIT FOR SCREENING MAMMOGRAM: Status: RESOLVED | Noted: 2018-08-01 | Resolved: 2018-08-31

## 2018-08-31 PROBLEM — Z00.00 ANNUAL PHYSICAL EXAM: Status: RESOLVED | Noted: 2018-08-01 | Resolved: 2018-08-31

## 2018-09-04 RX ORDER — ORPHENADRINE CITRATE 100 MG/1
100 TABLET, EXTENDED RELEASE ORAL EVERY EVENING
Qty: 30 TABLET | Refills: 0 | Status: SHIPPED | OUTPATIENT
Start: 2018-09-04 | End: 2018-10-01 | Stop reason: SDUPTHER

## 2018-09-26 PROBLEM — Z12.11 SCREENING FOR COLORECTAL CANCER: Status: RESOLVED | Noted: 2017-09-10 | Resolved: 2018-09-26

## 2018-09-26 PROBLEM — Z12.4 PAP SMEAR FOR CERVICAL CANCER SCREENING: Status: RESOLVED | Noted: 2017-09-10 | Resolved: 2018-09-26

## 2018-09-26 PROBLEM — Z12.12 SCREENING FOR COLORECTAL CANCER: Status: RESOLVED | Noted: 2017-09-10 | Resolved: 2018-09-26

## 2018-10-01 RX ORDER — ORPHENADRINE CITRATE 100 MG/1
100 TABLET, EXTENDED RELEASE ORAL EVERY EVENING
Qty: 30 TABLET | Refills: 0 | Status: SHIPPED | OUTPATIENT
Start: 2018-10-01 | End: 2018-11-05 | Stop reason: SDUPTHER

## 2018-11-06 RX ORDER — ORPHENADRINE CITRATE 100 MG/1
100 TABLET, EXTENDED RELEASE ORAL EVERY EVENING
Qty: 30 TABLET | Refills: 2 | Status: SHIPPED | OUTPATIENT
Start: 2018-11-06 | End: 2018-11-16

## 2018-11-26 DIAGNOSIS — M25.562 CHRONIC PAIN OF BOTH KNEES: ICD-10-CM

## 2018-11-26 DIAGNOSIS — G89.29 CHRONIC PAIN OF BOTH KNEES: ICD-10-CM

## 2018-11-26 DIAGNOSIS — M25.561 CHRONIC PAIN OF BOTH KNEES: ICD-10-CM

## 2018-11-26 RX ORDER — HYDROCODONE BITARTRATE AND ACETAMINOPHEN 7.5; 325 MG/1; MG/1
1 TABLET ORAL DAILY PRN
Qty: 10 TABLET | Refills: 0 | Status: SHIPPED | OUTPATIENT
Start: 2018-11-26 | End: 2018-12-06 | Stop reason: SDUPTHER

## 2018-12-06 ENCOUNTER — OFFICE VISIT (OUTPATIENT)
Dept: INTERNAL MEDICINE | Age: 58
End: 2018-12-06
Payer: COMMERCIAL

## 2018-12-06 VITALS
BODY MASS INDEX: 28.66 KG/M2 | HEIGHT: 65 IN | DIASTOLIC BLOOD PRESSURE: 84 MMHG | OXYGEN SATURATION: 96 % | HEART RATE: 68 BPM | WEIGHT: 172 LBS | SYSTOLIC BLOOD PRESSURE: 126 MMHG

## 2018-12-06 DIAGNOSIS — M05.741 RHEUMATOID ARTHRITIS INVOLVING BOTH HANDS WITH POSITIVE RHEUMATOID FACTOR (HCC): ICD-10-CM

## 2018-12-06 DIAGNOSIS — M25.562 CHRONIC PAIN OF BOTH KNEES: Primary | ICD-10-CM

## 2018-12-06 DIAGNOSIS — F32.9 REACTIVE DEPRESSION: ICD-10-CM

## 2018-12-06 DIAGNOSIS — G89.29 CHRONIC PAIN OF BOTH KNEES: Primary | ICD-10-CM

## 2018-12-06 DIAGNOSIS — M05.742 RHEUMATOID ARTHRITIS INVOLVING BOTH HANDS WITH POSITIVE RHEUMATOID FACTOR (HCC): ICD-10-CM

## 2018-12-06 DIAGNOSIS — M25.561 CHRONIC PAIN OF BOTH KNEES: Primary | ICD-10-CM

## 2018-12-06 DIAGNOSIS — F41.1 GENERALIZED ANXIETY DISORDER: ICD-10-CM

## 2018-12-06 DIAGNOSIS — I10 ESSENTIAL HYPERTENSION: ICD-10-CM

## 2018-12-06 LAB

## 2018-12-06 PROCEDURE — 80305 DRUG TEST PRSMV DIR OPT OBS: CPT | Performed by: INTERNAL MEDICINE

## 2018-12-06 PROCEDURE — 99214 OFFICE O/P EST MOD 30 MIN: CPT | Performed by: INTERNAL MEDICINE

## 2018-12-06 RX ORDER — HYDROCODONE BITARTRATE AND ACETAMINOPHEN 7.5; 325 MG/1; MG/1
1 TABLET ORAL DAILY PRN
Qty: 30 TABLET | Refills: 0 | Status: SHIPPED | OUTPATIENT
Start: 2018-12-06 | End: 2018-12-06 | Stop reason: SDUPTHER

## 2018-12-06 RX ORDER — HYDROCODONE BITARTRATE AND ACETAMINOPHEN 7.5; 325 MG/1; MG/1
1 TABLET ORAL DAILY PRN
Qty: 30 TABLET | Refills: 0 | Status: SHIPPED | OUTPATIENT
Start: 2018-12-06 | End: 2019-11-13 | Stop reason: SDUPTHER

## 2018-12-06 RX ORDER — BUSPIRONE HYDROCHLORIDE 15 MG/1
15 TABLET ORAL 2 TIMES DAILY PRN
Qty: 60 TABLET | Refills: 0 | Status: SHIPPED | OUTPATIENT
Start: 2018-12-06 | End: 2018-12-28 | Stop reason: SDUPTHER

## 2018-12-06 RX ORDER — ERGOCALCIFEROL 1.25 MG/1
CAPSULE ORAL
Qty: 4 CAPSULE | Refills: 5 | Status: SHIPPED | OUTPATIENT
Start: 2018-12-06 | End: 2019-11-13 | Stop reason: SDUPTHER

## 2018-12-06 ASSESSMENT — ENCOUNTER SYMPTOMS
SORE THROAT: 0
DIARRHEA: 0
CHEST TIGHTNESS: 0
WHEEZING: 0
VOICE CHANGE: 0
COUGH: 0
ABDOMINAL PAIN: 0
EYE PAIN: 0
VOMITING: 0
TROUBLE SWALLOWING: 0
SINUS PRESSURE: 0
CONSTIPATION: 0
COLOR CHANGE: 0
NAUSEA: 0
BLOOD IN STOOL: 0
EYE REDNESS: 0

## 2018-12-06 ASSESSMENT — PATIENT HEALTH QUESTIONNAIRE - PHQ9
SUM OF ALL RESPONSES TO PHQ9 QUESTIONS 1 & 2: 0
2. FEELING DOWN, DEPRESSED OR HOPELESS: 0
1. LITTLE INTEREST OR PLEASURE IN DOING THINGS: 0
SUM OF ALL RESPONSES TO PHQ QUESTIONS 1-9: 0
SUM OF ALL RESPONSES TO PHQ QUESTIONS 1-9: 0

## 2018-12-06 NOTE — PROGRESS NOTES
taty Fowler was reviewed  On exam today and as per discussions with the patient today there is no evidence of adverse events such as cognitive impairment, sedation, constipation or falls related to prescribed medications. There is also no evidence of aberrant behavior like lost prescriptions or early refill requests or multiple prescribers for controlled substances. Patient  was advised NOT to attempt to drive a motor vehichle or operate any heavy machinery within 6 hrs of taking the presribed medication -   -     HYDROcodone-acetaminophen (NORCO) 7.5-325 MG per tablet; Take 1 tablet by mouth daily as needed for Pain for up to 30 days. .      Generalized anxiety disorder  Reactive depression- very busy at work this time of the year  Cont fluoxetine  Add buspar 15 q 12 prn  If not better needs to call    Essential hypertension  Controlled  Cont metoprolol ER 50 + maxzide      Other orders  -     vitamin D (ERGOCALCIFEROL) 34510 units CAPS capsule; TAKE ONE CAPSULE BY MOUTH ONCE WEEKLY  -     busPIRone (BUSPAR) 15 MG tablet; Take 15 mg by mouth 2 times daily as needed (anxiety)      Orders Placed This Encounter   Procedures    POCT Rapid Drug Screen     New Prescriptions    BUSPIRONE (BUSPAR) 15 MG TABLET    Take 15 mg by mouth 2 times daily as needed (anxiety)        No Follow-up on file. There are no Patient Instructions on file for this visit. EMR Dragon/transcription disclaimer:Significant part of this  encounter note is electronic transcription/translationof spoken language to printed text. The electronic translation of spoken language may be erroneous, or at times, nonsensical words or phrases may be inadvertently transcribed.  Although I have reviewed the note for sucherrors, some may still exist.

## 2018-12-28 RX ORDER — BUSPIRONE HYDROCHLORIDE 15 MG/1
15 TABLET ORAL 2 TIMES DAILY PRN
Qty: 60 TABLET | Refills: 0 | Status: SHIPPED | OUTPATIENT
Start: 2018-12-28 | End: 2019-10-10 | Stop reason: SDUPTHER

## 2018-12-28 RX ORDER — PANTOPRAZOLE SODIUM 40 MG/1
40 TABLET, DELAYED RELEASE ORAL DAILY
Qty: 30 TABLET | Refills: 1 | Status: SHIPPED | OUTPATIENT
Start: 2018-12-28 | End: 2019-03-03 | Stop reason: SDUPTHER

## 2019-02-05 RX ORDER — METOPROLOL SUCCINATE 50 MG/1
TABLET, EXTENDED RELEASE ORAL
Qty: 90 TABLET | Refills: 0 | Status: SHIPPED | OUTPATIENT
Start: 2019-02-05 | End: 2019-08-05 | Stop reason: SDUPTHER

## 2019-03-04 RX ORDER — PANTOPRAZOLE SODIUM 40 MG/1
TABLET, DELAYED RELEASE ORAL
Qty: 30 TABLET | Refills: 3 | Status: SHIPPED | OUTPATIENT
Start: 2019-03-04 | End: 2019-10-10 | Stop reason: SDUPTHER

## 2019-05-08 ENCOUNTER — TELEPHONE (OUTPATIENT)
Dept: INTERNAL MEDICINE | Age: 59
End: 2019-05-08

## 2019-08-01 RX ORDER — BUSPIRONE HYDROCHLORIDE 15 MG/1
TABLET ORAL
Qty: 60 TABLET | Refills: 0 | OUTPATIENT
Start: 2019-08-01

## 2019-08-05 ENCOUNTER — TELEPHONE (OUTPATIENT)
Dept: INTERNAL MEDICINE | Age: 59
End: 2019-08-05

## 2019-08-05 RX ORDER — TRIAMTERENE AND HYDROCHLOROTHIAZIDE 37.5; 25 MG/1; MG/1
1 TABLET ORAL DAILY
Qty: 30 TABLET | Refills: 0 | Status: SHIPPED | OUTPATIENT
Start: 2019-08-05 | End: 2020-09-23 | Stop reason: SDUPTHER

## 2019-08-05 RX ORDER — ROSUVASTATIN CALCIUM 10 MG/1
10 TABLET, COATED ORAL NIGHTLY
Qty: 30 TABLET | Refills: 0 | Status: SHIPPED | OUTPATIENT
Start: 2019-08-05 | End: 2019-11-13 | Stop reason: SDUPTHER

## 2019-08-05 RX ORDER — METOPROLOL SUCCINATE 50 MG/1
TABLET, EXTENDED RELEASE ORAL
Qty: 30 TABLET | Refills: 0 | Status: SHIPPED | OUTPATIENT
Start: 2019-08-05 | End: 2019-11-13 | Stop reason: SDUPTHER

## 2019-08-05 RX ORDER — FLUOXETINE HYDROCHLORIDE 40 MG/1
CAPSULE ORAL
Qty: 30 CAPSULE | Refills: 0 | Status: SHIPPED | OUTPATIENT
Start: 2019-08-05 | End: 2019-10-10 | Stop reason: SDUPTHER

## 2019-08-05 NOTE — TELEPHONE ENCOUNTER
Renée Philip called requesting a refill of the below medication which has been pended for you:     Requested Prescriptions     Pending Prescriptions Disp Refills    metoprolol succinate (TOPROL XL) 50 MG extended release tablet 30 tablet 0     Sig: TAKE 1 TABLET BY MOUTH DAILY    rosuvastatin (CRESTOR) 10 MG tablet 30 tablet 0     Sig: Take 1 tablet by mouth nightly    FLUoxetine (PROZAC) 40 MG capsule 30 capsule 0     Sig: TAKE ONE CAPSULE BY MOUTH EVERY DAY    triamterene-hydrochlorothiazide (MAXZIDE-25) 37.5-25 MG per tablet 30 tablet 0     Sig: Take 1 tablet by mouth daily       Last Appointment Date: 12/6/2018  Next Appointment Date: Visit date not found    Allergies   Allergen Reactions    Morphine And Related

## 2019-09-01 RX ORDER — ROSUVASTATIN CALCIUM 10 MG/1
TABLET, COATED ORAL
Qty: 30 TABLET | Refills: 0 | OUTPATIENT
Start: 2019-09-01

## 2019-09-01 RX ORDER — TRIAMTERENE AND HYDROCHLOROTHIAZIDE 37.5; 25 MG/1; MG/1
TABLET ORAL
Qty: 30 TABLET | Refills: 0 | OUTPATIENT
Start: 2019-09-01

## 2019-09-06 ENCOUNTER — TELEPHONE (OUTPATIENT)
Dept: INTERNAL MEDICINE | Age: 59
End: 2019-09-06

## 2019-09-06 RX ORDER — METOPROLOL SUCCINATE 50 MG/1
TABLET, EXTENDED RELEASE ORAL
Qty: 30 TABLET | Refills: 0 | OUTPATIENT
Start: 2019-09-06

## 2019-09-29 RX ORDER — ROSUVASTATIN CALCIUM 10 MG/1
TABLET, COATED ORAL
Qty: 30 TABLET | Refills: 0 | OUTPATIENT
Start: 2019-09-29

## 2019-09-29 RX ORDER — METOPROLOL SUCCINATE 50 MG/1
TABLET, EXTENDED RELEASE ORAL
Qty: 30 TABLET | Refills: 0 | OUTPATIENT
Start: 2019-09-29

## 2019-10-10 ENCOUNTER — TELEPHONE (OUTPATIENT)
Dept: INTERNAL MEDICINE | Age: 59
End: 2019-10-10

## 2019-10-11 RX ORDER — BUSPIRONE HYDROCHLORIDE 15 MG/1
15 TABLET ORAL 2 TIMES DAILY PRN
Qty: 60 TABLET | Refills: 0 | Status: SHIPPED | OUTPATIENT
Start: 2019-10-11 | End: 2019-11-05 | Stop reason: SDUPTHER

## 2019-10-11 RX ORDER — FLUOXETINE HYDROCHLORIDE 40 MG/1
40 CAPSULE ORAL DAILY
Qty: 30 CAPSULE | Refills: 0 | Status: SHIPPED | OUTPATIENT
Start: 2019-10-11 | End: 2019-11-05 | Stop reason: SDUPTHER

## 2019-10-11 RX ORDER — PANTOPRAZOLE SODIUM 40 MG/1
40 TABLET, DELAYED RELEASE ORAL DAILY
Qty: 30 TABLET | Refills: 0 | Status: SHIPPED | OUTPATIENT
Start: 2019-10-11 | End: 2019-11-13 | Stop reason: SDUPTHER

## 2019-10-22 ENCOUNTER — TELEPHONE (OUTPATIENT)
Dept: INTERNAL MEDICINE | Age: 59
End: 2019-10-22

## 2019-10-22 DIAGNOSIS — Z00.00 ANNUAL PHYSICAL EXAM: Primary | ICD-10-CM

## 2019-10-22 DIAGNOSIS — I10 ESSENTIAL HYPERTENSION: ICD-10-CM

## 2019-11-05 RX ORDER — BUSPIRONE HYDROCHLORIDE 15 MG/1
TABLET ORAL
Qty: 30 TABLET | Refills: 0 | Status: SHIPPED | OUTPATIENT
Start: 2019-11-05 | End: 2019-11-13 | Stop reason: SDUPTHER

## 2019-11-05 RX ORDER — FLUOXETINE HYDROCHLORIDE 40 MG/1
40 CAPSULE ORAL DAILY
Qty: 15 CAPSULE | Refills: 0 | Status: SHIPPED | OUTPATIENT
Start: 2019-11-05 | End: 2019-11-13 | Stop reason: SDUPTHER

## 2019-11-08 DIAGNOSIS — I10 ESSENTIAL HYPERTENSION: ICD-10-CM

## 2019-11-08 DIAGNOSIS — Z00.00 ANNUAL PHYSICAL EXAM: ICD-10-CM

## 2019-11-08 LAB
ALBUMIN SERPL-MCNC: 4.2 G/DL (ref 3.5–5.2)
ALP BLD-CCNC: 142 U/L (ref 35–104)
ALT SERPL-CCNC: 37 U/L (ref 5–33)
ANION GAP SERPL CALCULATED.3IONS-SCNC: 12 MMOL/L (ref 7–19)
AST SERPL-CCNC: 26 U/L (ref 5–32)
BASOPHILS ABSOLUTE: 0.1 K/UL (ref 0–0.2)
BASOPHILS RELATIVE PERCENT: 1.2 % (ref 0–1)
BILIRUB SERPL-MCNC: 0.5 MG/DL (ref 0.2–1.2)
BILIRUBIN URINE: NEGATIVE
BLOOD, URINE: NEGATIVE
BUN BLDV-MCNC: 18 MG/DL (ref 8–23)
CALCIUM SERPL-MCNC: 9.3 MG/DL (ref 8.8–10.2)
CHLORIDE BLD-SCNC: 107 MMOL/L (ref 98–111)
CHOLESTEROL, TOTAL: 179 MG/DL (ref 160–199)
CLARITY: CLEAR
CO2: 25 MMOL/L (ref 22–29)
COLOR: YELLOW
CREAT SERPL-MCNC: 0.7 MG/DL (ref 0.5–0.9)
EOSINOPHILS ABSOLUTE: 0.3 K/UL (ref 0–0.6)
EOSINOPHILS RELATIVE PERCENT: 5.6 % (ref 0–5)
GFR NON-AFRICAN AMERICAN: >60
GLUCOSE BLD-MCNC: 91 MG/DL (ref 74–109)
GLUCOSE URINE: NEGATIVE MG/DL
HCT VFR BLD CALC: 43.5 % (ref 37–47)
HDLC SERPL-MCNC: 56 MG/DL (ref 65–121)
HEMOGLOBIN: 13.2 G/DL (ref 12–16)
IMMATURE GRANULOCYTES #: 0 K/UL
KETONES, URINE: NEGATIVE MG/DL
LDL CHOLESTEROL CALCULATED: 104 MG/DL
LEUKOCYTE ESTERASE, URINE: NEGATIVE
LYMPHOCYTES ABSOLUTE: 1.6 K/UL (ref 1.1–4.5)
LYMPHOCYTES RELATIVE PERCENT: 26.6 % (ref 20–40)
MCH RBC QN AUTO: 28.1 PG (ref 27–31)
MCHC RBC AUTO-ENTMCNC: 30.3 G/DL (ref 33–37)
MCV RBC AUTO: 92.6 FL (ref 81–99)
MONOCYTES ABSOLUTE: 0.3 K/UL (ref 0–0.9)
MONOCYTES RELATIVE PERCENT: 5.8 % (ref 0–10)
NEUTROPHILS ABSOLUTE: 3.5 K/UL (ref 1.5–7.5)
NEUTROPHILS RELATIVE PERCENT: 60.5 % (ref 50–65)
NITRITE, URINE: NEGATIVE
PDW BLD-RTO: 13.9 % (ref 11.5–14.5)
PH UA: 6 (ref 5–8)
PLATELET # BLD: 286 K/UL (ref 130–400)
PMV BLD AUTO: 9.1 FL (ref 9.4–12.3)
POTASSIUM SERPL-SCNC: 3.8 MMOL/L (ref 3.5–5)
PROTEIN UA: NEGATIVE MG/DL
RBC # BLD: 4.7 M/UL (ref 4.2–5.4)
SODIUM BLD-SCNC: 144 MMOL/L (ref 136–145)
SPECIFIC GRAVITY UA: 1.02 (ref 1–1.03)
TOTAL PROTEIN: 7 G/DL (ref 6.6–8.7)
TRIGL SERPL-MCNC: 95 MG/DL (ref 0–149)
TSH SERPL DL<=0.05 MIU/L-ACNC: 1.26 UIU/ML (ref 0.27–4.2)
URINE REFLEX TO CULTURE: NORMAL
UROBILINOGEN, URINE: 1 E.U./DL
VITAMIN D 25-HYDROXY: 33.3 NG/ML
WBC # BLD: 5.9 K/UL (ref 4.8–10.8)

## 2019-11-13 ENCOUNTER — OFFICE VISIT (OUTPATIENT)
Dept: INTERNAL MEDICINE | Age: 59
End: 2019-11-13
Payer: COMMERCIAL

## 2019-11-13 VITALS
OXYGEN SATURATION: 98 % | BODY MASS INDEX: 28.82 KG/M2 | WEIGHT: 173 LBS | SYSTOLIC BLOOD PRESSURE: 138 MMHG | HEART RATE: 74 BPM | HEIGHT: 65 IN | DIASTOLIC BLOOD PRESSURE: 82 MMHG

## 2019-11-13 DIAGNOSIS — G89.29 CHRONIC PAIN OF BOTH KNEES: ICD-10-CM

## 2019-11-13 DIAGNOSIS — I10 ESSENTIAL HYPERTENSION: ICD-10-CM

## 2019-11-13 DIAGNOSIS — E78.00 PURE HYPERCHOLESTEROLEMIA: ICD-10-CM

## 2019-11-13 DIAGNOSIS — F51.01 PRIMARY INSOMNIA: ICD-10-CM

## 2019-11-13 DIAGNOSIS — M25.561 CHRONIC PAIN OF BOTH KNEES: ICD-10-CM

## 2019-11-13 DIAGNOSIS — M25.562 CHRONIC PAIN OF BOTH KNEES: ICD-10-CM

## 2019-11-13 DIAGNOSIS — E55.9 VITAMIN D DEFICIENCY: ICD-10-CM

## 2019-11-13 DIAGNOSIS — Z12.31 SCREENING MAMMOGRAM, ENCOUNTER FOR: Primary | ICD-10-CM

## 2019-11-13 DIAGNOSIS — M81.6 LOCALIZED OSTEOPOROSIS WITHOUT CURRENT PATHOLOGICAL FRACTURE: ICD-10-CM

## 2019-11-13 PROCEDURE — 99396 PREV VISIT EST AGE 40-64: CPT | Performed by: INTERNAL MEDICINE

## 2019-11-13 RX ORDER — ERGOCALCIFEROL 1.25 MG/1
CAPSULE ORAL
Qty: 4 CAPSULE | Refills: 5 | Status: SHIPPED | OUTPATIENT
Start: 2019-11-13 | End: 2020-09-23 | Stop reason: SDUPTHER

## 2019-11-13 RX ORDER — FLUTICASONE PROPIONATE 50 MCG
2 SPRAY, SUSPENSION (ML) NASAL DAILY
Qty: 1 BOTTLE | Refills: 1 | Status: SHIPPED | OUTPATIENT
Start: 2019-11-13 | End: 2020-01-09

## 2019-11-13 RX ORDER — BUSPIRONE HYDROCHLORIDE 15 MG/1
TABLET ORAL
Qty: 60 TABLET | Refills: 5 | Status: SHIPPED | OUTPATIENT
Start: 2019-11-13 | End: 2020-09-23 | Stop reason: SDUPTHER

## 2019-11-13 RX ORDER — HYDROCODONE BITARTRATE AND ACETAMINOPHEN 7.5; 325 MG/1; MG/1
1 TABLET ORAL DAILY PRN
Qty: 30 TABLET | Refills: 0 | Status: SHIPPED | OUTPATIENT
Start: 2019-11-13 | End: 2020-09-23 | Stop reason: SDUPTHER

## 2019-11-13 RX ORDER — METOPROLOL SUCCINATE 50 MG/1
TABLET, EXTENDED RELEASE ORAL
Qty: 30 TABLET | Refills: 5 | Status: SHIPPED | OUTPATIENT
Start: 2019-11-13 | End: 2020-09-23 | Stop reason: SDUPTHER

## 2019-11-13 RX ORDER — ROSUVASTATIN CALCIUM 10 MG/1
10 TABLET, COATED ORAL NIGHTLY
Qty: 30 TABLET | Refills: 5 | Status: SHIPPED | OUTPATIENT
Start: 2019-11-13 | End: 2020-09-23 | Stop reason: SDUPTHER

## 2019-11-13 RX ORDER — LEVOFLOXACIN 500 MG/1
500 TABLET, FILM COATED ORAL DAILY
Qty: 10 TABLET | Refills: 0 | Status: SHIPPED | OUTPATIENT
Start: 2019-11-13 | End: 2019-11-23

## 2019-11-13 RX ORDER — PREDNISONE 10 MG/1
10 TABLET ORAL 2 TIMES DAILY
Qty: 6 TABLET | Refills: 0 | Status: SHIPPED | OUTPATIENT
Start: 2019-11-13 | End: 2019-11-16

## 2019-11-13 RX ORDER — FLUOXETINE HYDROCHLORIDE 40 MG/1
40 CAPSULE ORAL DAILY
Qty: 30 CAPSULE | Refills: 5 | Status: SHIPPED | OUTPATIENT
Start: 2019-11-13 | End: 2020-09-23 | Stop reason: SDUPTHER

## 2019-11-13 RX ORDER — ZOLPIDEM TARTRATE 10 MG/1
10 TABLET ORAL NIGHTLY PRN
Qty: 30 TABLET | Refills: 5 | Status: SHIPPED | OUTPATIENT
Start: 2019-11-13 | End: 2019-12-13

## 2019-11-13 RX ORDER — PANTOPRAZOLE SODIUM 40 MG/1
40 TABLET, DELAYED RELEASE ORAL DAILY
Qty: 30 TABLET | Refills: 5 | Status: SHIPPED | OUTPATIENT
Start: 2019-11-13 | End: 2020-05-06

## 2019-11-13 ASSESSMENT — ENCOUNTER SYMPTOMS
WHEEZING: 0
DIARRHEA: 0
SINUS PRESSURE: 1
SORE THROAT: 0
CHEST TIGHTNESS: 0
SINUS PAIN: 1
VOMITING: 0
BLOOD IN STOOL: 0
VOICE CHANGE: 0
CONSTIPATION: 0
COLOR CHANGE: 0
TROUBLE SWALLOWING: 0
COUGH: 0
NAUSEA: 0
EYE REDNESS: 0
EYE PAIN: 0
ABDOMINAL PAIN: 0

## 2020-01-09 RX ORDER — FLUTICASONE PROPIONATE 50 MCG
SPRAY, SUSPENSION (ML) NASAL
Qty: 16 G | Refills: 1 | Status: SHIPPED | OUTPATIENT
Start: 2020-01-09 | End: 2020-09-23 | Stop reason: SDUPTHER

## 2020-01-09 NOTE — TELEPHONE ENCOUNTER
Phyllistine Barrera called requesting a refill of the below medication which has been pended for you:     Requested Prescriptions     Pending Prescriptions Disp Refills    fluticasone (FLONASE) 50 MCG/ACT nasal spray [Pharmacy Med Name: FLUTICASONE 50MCG NASAL SP (120) RX] 16 g      Sig: SHAKE LIQUID AND USE 2 SPRAYS IN EACH NOSTRIL DAILY       Last Appointment Date: 11/13/2019  Next Appointment Date: 5/12/2020    Allergies   Allergen Reactions    Morphine And Related

## 2020-03-18 ENCOUNTER — TELEPHONE (OUTPATIENT)
Dept: INTERNAL MEDICINE | Age: 60
End: 2020-03-18

## 2020-03-18 NOTE — TELEPHONE ENCOUNTER
Pt states that she has a sinus infection and some ear pain., Wants some medication sent to [facility]

## 2020-03-18 NOTE — TELEPHONE ENCOUNTER
Pt's voicemail is full.  Tried to contact pt to get pt to  Sign in to [employer] to do an E-visit with Dr. Celestina Gilbert

## 2020-04-03 ENCOUNTER — TELEPHONE (OUTPATIENT)
Dept: GASTROENTEROLOGY | Facility: CLINIC | Age: 60
End: 2020-04-03

## 2020-04-03 NOTE — TELEPHONE ENCOUNTER
I had sent pt a letter re: recall colon-it was returned. I tried to call today to discuss-the number listed in chart is disconnected. I will send noncompliant letter to PCP.

## 2020-04-24 ENCOUNTER — TELEPHONE (OUTPATIENT)
Dept: ADMINISTRATIVE | Age: 60
End: 2020-04-24

## 2020-06-23 RX ORDER — ERGOCALCIFEROL 1.25 MG/1
CAPSULE ORAL
Qty: 4 CAPSULE | Refills: 5 | OUTPATIENT
Start: 2020-06-23

## 2020-07-13 RX ORDER — METOPROLOL SUCCINATE 50 MG/1
TABLET, EXTENDED RELEASE ORAL
Qty: 30 TABLET | Refills: 5 | OUTPATIENT
Start: 2020-07-13

## 2020-09-23 ENCOUNTER — OFFICE VISIT (OUTPATIENT)
Dept: INTERNAL MEDICINE | Age: 60
End: 2020-09-23
Payer: COMMERCIAL

## 2020-09-23 VITALS
HEIGHT: 65 IN | SYSTOLIC BLOOD PRESSURE: 138 MMHG | WEIGHT: 180 LBS | BODY MASS INDEX: 29.99 KG/M2 | OXYGEN SATURATION: 97 % | DIASTOLIC BLOOD PRESSURE: 88 MMHG | HEART RATE: 90 BPM | RESPIRATION RATE: 18 BRPM

## 2020-09-23 PROCEDURE — 99214 OFFICE O/P EST MOD 30 MIN: CPT | Performed by: INTERNAL MEDICINE

## 2020-09-23 RX ORDER — ROSUVASTATIN CALCIUM 10 MG/1
10 TABLET, COATED ORAL NIGHTLY
Qty: 30 TABLET | Refills: 5 | Status: SHIPPED | OUTPATIENT
Start: 2020-09-23 | End: 2020-09-23

## 2020-09-23 RX ORDER — METOPROLOL SUCCINATE 50 MG/1
TABLET, EXTENDED RELEASE ORAL
Qty: 30 TABLET | Refills: 5 | Status: SHIPPED | OUTPATIENT
Start: 2020-09-23 | End: 2020-09-23

## 2020-09-23 RX ORDER — ERGOCALCIFEROL 1.25 MG/1
CAPSULE ORAL
Qty: 4 CAPSULE | Refills: 5 | Status: SHIPPED | OUTPATIENT
Start: 2020-09-23 | End: 2020-09-23

## 2020-09-23 RX ORDER — FLUTICASONE PROPIONATE 50 MCG
SPRAY, SUSPENSION (ML) NASAL
Qty: 16 G | Refills: 1 | Status: SHIPPED | OUTPATIENT
Start: 2020-09-23 | End: 2020-09-23

## 2020-09-23 RX ORDER — ROSUVASTATIN CALCIUM 10 MG/1
10 TABLET, COATED ORAL NIGHTLY
Qty: 30 TABLET | Refills: 2 | Status: SHIPPED | OUTPATIENT
Start: 2020-09-23 | End: 2020-11-30 | Stop reason: SDUPTHER

## 2020-09-23 RX ORDER — METOPROLOL SUCCINATE 50 MG/1
TABLET, EXTENDED RELEASE ORAL
Qty: 30 TABLET | Refills: 2 | Status: SHIPPED | OUTPATIENT
Start: 2020-09-23 | End: 2020-12-29 | Stop reason: SDUPTHER

## 2020-09-23 RX ORDER — PANTOPRAZOLE SODIUM 40 MG/1
TABLET, DELAYED RELEASE ORAL
Qty: 30 TABLET | Refills: 5 | Status: SHIPPED | OUTPATIENT
Start: 2020-09-23 | End: 2020-09-23

## 2020-09-23 RX ORDER — HYDROCODONE BITARTRATE AND ACETAMINOPHEN 7.5; 325 MG/1; MG/1
1 TABLET ORAL DAILY PRN
Qty: 30 TABLET | Refills: 0 | Status: SHIPPED | OUTPATIENT
Start: 2020-09-23 | End: 2020-12-07 | Stop reason: SDUPTHER

## 2020-09-23 RX ORDER — TRIAMTERENE AND HYDROCHLOROTHIAZIDE 37.5; 25 MG/1; MG/1
1 TABLET ORAL DAILY
Qty: 30 TABLET | Refills: 2 | Status: SHIPPED | OUTPATIENT
Start: 2020-09-23 | End: 2021-12-07 | Stop reason: CLARIF

## 2020-09-23 RX ORDER — BUSPIRONE HYDROCHLORIDE 15 MG/1
TABLET ORAL
Qty: 60 TABLET | Refills: 2 | Status: SHIPPED | OUTPATIENT
Start: 2020-09-23 | End: 2022-04-13 | Stop reason: SDUPTHER

## 2020-09-23 RX ORDER — FLUOXETINE HYDROCHLORIDE 40 MG/1
40 CAPSULE ORAL DAILY
Qty: 30 CAPSULE | Refills: 2 | Status: SHIPPED | OUTPATIENT
Start: 2020-09-23 | End: 2020-10-01

## 2020-09-23 RX ORDER — FLUOXETINE HYDROCHLORIDE 40 MG/1
40 CAPSULE ORAL DAILY
Qty: 30 CAPSULE | Refills: 5 | Status: SHIPPED | OUTPATIENT
Start: 2020-09-23 | End: 2020-09-23

## 2020-09-23 RX ORDER — FLUTICASONE PROPIONATE 50 MCG
SPRAY, SUSPENSION (ML) NASAL
Qty: 16 G | Refills: 2 | Status: SHIPPED | OUTPATIENT
Start: 2020-09-23 | End: 2020-12-30 | Stop reason: SDUPTHER

## 2020-09-23 RX ORDER — PANTOPRAZOLE SODIUM 40 MG/1
TABLET, DELAYED RELEASE ORAL
Qty: 30 TABLET | Refills: 2 | Status: SHIPPED | OUTPATIENT
Start: 2020-09-23 | End: 2020-12-29 | Stop reason: SDUPTHER

## 2020-09-23 RX ORDER — TRIAMTERENE AND HYDROCHLOROTHIAZIDE 37.5; 25 MG/1; MG/1
1 TABLET ORAL DAILY
Qty: 30 TABLET | Refills: 5 | Status: SHIPPED | OUTPATIENT
Start: 2020-09-23 | End: 2020-09-23

## 2020-09-23 RX ORDER — BUSPIRONE HYDROCHLORIDE 15 MG/1
TABLET ORAL
Qty: 60 TABLET | Refills: 5 | Status: SHIPPED | OUTPATIENT
Start: 2020-09-23 | End: 2020-09-23

## 2020-09-23 RX ORDER — ERGOCALCIFEROL 1.25 MG/1
CAPSULE ORAL
Qty: 4 CAPSULE | Refills: 2 | Status: SHIPPED | OUTPATIENT
Start: 2020-09-23 | End: 2020-12-07 | Stop reason: SDUPTHER

## 2020-09-23 ASSESSMENT — ENCOUNTER SYMPTOMS
BACK PAIN: 1
COUGH: 0
CHEST TIGHTNESS: 0
ABDOMINAL PAIN: 0
WHEEZING: 0
SORE THROAT: 0
CONSTIPATION: 0

## 2020-09-23 NOTE — PROGRESS NOTES
Called [facility] and cancelled all of the meds that we sent today, and re-sent them with just 2 refills to make sure pt shows her her 3 month follow up.

## 2020-09-23 NOTE — PROGRESS NOTES
(BUSPAR) 15 MG tablet TAKE 1 TABLET BY MOUTH TWICE DAILY AS NEEDED FOR ANXIETY 60 tablet 5    FLUoxetine (PROZAC) 40 MG capsule Take 1 capsule by mouth daily 30 capsule 5    fluticasone (FLONASE) 50 MCG/ACT nasal spray SHAKE LIQUID AND USE 2 SPRAYS IN EACH NOSTRIL DAILY 16 g 1    metoprolol succinate (TOPROL XL) 50 MG extended release tablet TAKE 1 TABLET BY MOUTH DAILY 30 tablet 5    pantoprazole (PROTONIX) 40 MG tablet TAKE 1 TABLET BY MOUTH EVERY DAY 30 tablet 5    rosuvastatin (CRESTOR) 10 MG tablet Take 1 tablet by mouth nightly 30 tablet 5    triamterene-hydroCHLOROthiazide (MAXZIDE-25) 37.5-25 MG per tablet Take 1 tablet by mouth daily 30 tablet 5    vitamin D (ERGOCALCIFEROL) 1.25 MG (16587 UT) CAPS capsule TAKE ONE CAPSULE BY MOUTH ONCE WEEKLY 4 capsule 5    HYDROcodone-acetaminophen (NORCO) 7.5-325 MG per tablet Take 1 tablet by mouth daily as needed for Pain for up to 30 days. 30 tablet 0    Cholecalciferol (VITAMIN D3) 2000 units CAPS Take by mouth      aspirin 81 MG chewable tablet Take 81 mg by mouth daily       No current facility-administered medications for this visit. Allergies   Allergen Reactions    Morphine And Related      Social History     Tobacco Use    Smoking status: Never Smoker    Smokeless tobacco: Never Used   Substance Use Topics    Alcohol use: Yes      Family History   Problem Relation Age of Onset    Lung Cancer Father     Colon Cancer Sister 62        COLON    Other Sister         PANCREATIC CANCER    Diabetes Brother     Coronary Art Dis Brother        Review of Systems   Constitutional: Positive for fatigue. Negative for chills and fever. HENT: Negative for congestion, ear pain, nosebleeds, postnasal drip and sore throat. Respiratory: Negative for cough, chest tightness and wheezing. Cardiovascular: Negative for chest pain, palpitations and leg swelling. Gastrointestinal: Negative for abdominal pain and constipation.    Genitourinary: Negative for dysuria and urgency. Musculoskeletal: Positive for arthralgias and back pain. Skin: Negative for rash. Neurological: Negative for dizziness and headaches. Psychiatric/Behavioral: Negative. Vitals:    09/23/20 0842   BP: 138/88   Site: Left Upper Arm   Position: Sitting   Cuff Size: Large Adult   Pulse: 90   Resp: 18   SpO2: 97%   Weight: 180 lb (81.6 kg)   Height: 5' 5\" (1.651 m)     Body mass index is 29.95 kg/m². Physical Exam  Constitutional:       Appearance: She is well-developed. HENT:      Right Ear: External ear normal.      Left Ear: External ear normal.      Mouth/Throat:      Pharynx: No oropharyngeal exudate. Eyes:      Conjunctiva/sclera: Conjunctivae normal.      Pupils: Pupils are equal, round, and reactive to light. Neck:      Musculoskeletal: Neck supple. Thyroid: No thyromegaly. Vascular: No JVD. Cardiovascular:      Rate and Rhythm: Normal rate. Heart sounds: Normal heart sounds. No murmur. Pulmonary:      Effort: No respiratory distress. Breath sounds: Normal breath sounds. No wheezing or rales. Chest:      Chest wall: No tenderness. Abdominal:      General: Bowel sounds are normal.      Palpations: Abdomen is soft. Musculoskeletal: Normal range of motion. Lymphadenopathy:      Cervical: No cervical adenopathy. Skin:     General: Skin is warm. Findings: No rash. Neurological:      Mental Status: She is oriented to person, place, and time. Lab Review   No visits with results within 2 Month(s) from this visit.    Latest known visit with results is:   Orders Only on 11/08/2019   Component Date Value    TSH 11/08/2019 1.260     Color, UA 11/08/2019 YELLOW     Clarity, UA 11/08/2019 Clear     Glucose, Ur 11/08/2019 Negative     Bilirubin Urine 11/08/2019 Negative     Ketones, Urine 11/08/2019 Negative     Specific Gravity, UA 11/08/2019 1.025     Blood, Urine 11/08/2019 Negative     pH, UA 11/08/2019 6.0     Protein, UA 11/08/2019 Negative     Urobilinogen, Urine 11/08/2019 1.0     Nitrite, Urine 11/08/2019 Negative     Leukocyte Esterase, Urine 11/08/2019 Negative     Urine Reflex to Culture 11/08/2019 Not Indicated     Vit D, 25-Hydroxy 11/08/2019 33.3     WBC 11/08/2019 5.9     RBC 11/08/2019 4.70     Hemoglobin 11/08/2019 13.2     Hematocrit 11/08/2019 43.5     MCV 11/08/2019 92.6     MCH 11/08/2019 28.1     MCHC 11/08/2019 30.3*    RDW 11/08/2019 13.9     Platelets 39/29/1997 286     MPV 11/08/2019 9.1*    Neutrophils % 11/08/2019 60.5     Lymphocytes % 11/08/2019 26.6     Monocytes % 11/08/2019 5.8     Eosinophils % 11/08/2019 5.6*    Basophils % 11/08/2019 1.2*    Neutrophils Absolute 11/08/2019 3.5     Immature Granulocytes # 11/08/2019 0.0     Lymphocytes Absolute 11/08/2019 1.6     Monocytes Absolute 11/08/2019 0.30     Eosinophils Absolute 11/08/2019 0.30     Basophils Absolute 11/08/2019 0.10     Sodium 11/08/2019 144     Potassium 11/08/2019 3.8     Chloride 11/08/2019 107     CO2 11/08/2019 25     Anion Gap 11/08/2019 12     Glucose 11/08/2019 91     BUN 11/08/2019 18     CREATININE 11/08/2019 0.7     GFR Non- 11/08/2019 >60     Calcium 11/08/2019 9.3     Total Protein 11/08/2019 7.0     Alb 11/08/2019 4.2     Total Bilirubin 11/08/2019 0.5     Alkaline Phosphatase 11/08/2019 142*    ALT 11/08/2019 37*    AST 11/08/2019 26     Cholesterol, Total 11/08/2019 179     Triglycerides 11/08/2019 95     HDL 11/08/2019 56*    LDL Calculated 11/08/2019 104            ASSESSMENT/PLAN:    Essential hypertension- blood pressure has been in good range, we'll continue metoprolol     Pure hypercholesterolemia- LDL improved at 104 in 11/2019,  restart crestor 10     Vitamin D deficiency-  vitamin D level 33 (42)(29)   Restart vitamin D 50,000 IU weekly        Generalized anxiety disorder-she lost her mother in July 2020, 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 There is also no evidence of aberrant behavior like lost prescriptions or early refill requests or multiple prescribers for controlled substances.     Patient  was advised NOT to attempt to drive a motor vehichle or operate any heavy machinery within 6 hrs of taking the presribed medication - hydrocodone  Liana Valverde, UDS 9/23/20    Patient has not been taking her medications for cholesterol for over 3 months  We are planning wellness visit in December 2020  We will not do lab work until December 2020  She is restarting her medications, diet and weight loss now in September 2020         Orders Placed This Encounter   Procedures    CBC Auto Differential    Comprehensive Metabolic Panel    Lipid Panel    TSH without Reflex    Urinalysis    Vitamin D 25 Hydroxy     New Prescriptions    No medications on file         Return in about 3 months (around 12/23/2020) for Annual Physical.   There are no Patient Instructions on file for this visit. EMR Dragon/transcription disclaimer:Significant part of this  encounter note is electronic transcription/translationof spoken language to printed text. The electronic translation of spoken language may be erroneous, or at times, nonsensical words or phrases may be inadvertently transcribed.  Although I have reviewed the note for sucherrors, some may still exist.

## 2020-09-23 NOTE — LETTER
reduced coughing, which is especially dangerous for patients with lung disease. Overdose or dangerous interactions with alcohol and other medications may occur, leading to death. Hyperalgesia may develop, which means patients receiving opioids for the treatment of pain may become more sensitive to certain painful stimuli, and in some cases, experience pain from ordinarily non-painful stimuli. Women between the ages of 14-53 who could become pregnant should carefully weigh the risks and benefits of opioids with their physicians, as these medications increase the risk of pregnancy complications, including miscarriage,  delivery and stillbirth. It is also possible for babies to be born addicted to opioids. Opioid dependence withdrawal symptoms may include; feelings of uneasiness, increased pain, irritability, belly pain, diarrhea, sweats and goose-flesh. Benzodiazepines and non-benzodiazepine sleep medications: These medications can lead to problems such as addiction/dependence, sedation, fatigue, lightheadedness, dizziness, incoordination, falls, depression, hallucinations, and impaired judgment, memory and concentration. The ability to drive and operate machinery may also be affected. Abnormal sleep-related behaviors have been reported, including sleepwalking, driving, making telephone calls, eating, or having sex while not fully awake. These medications can suppress breathing and worsen sleep apnea, particularly when combined with alcohol or other sedating medications, potentially leading to death. Dependence withdrawal symptoms may include tremors, anxiety, hallucinations and seizures.   Stimulants:  Common adverse effects include addiction/dependence, increased blood  pressure and heart rate, decreased appetite, nausea, involuntary weight loss, insomnia,                                                                                                                     Initials:_______ irritability, and headaches. These risks may increase when these medications are combined with other stimulants, such as caffeine pills or energy drinks, certain weight loss supplements and oral decongestants. Dependence withdrawal symptoms may include depressed mood, loss of interest, suicidal thoughts, anxiety, fatigue, appetite changes and agitation. Testosterone replacement therapy:  Potential side effects include increased risk of stroke and heart attack, blood clots, increased blood pressure, increased cholesterol, enlarged prostate, sleep apnea, irritability/aggression and other mood disorders, and decreased fertility. I agree and understand that I and my prescriber have the following rights and responsibilities regarding my treatment plan:     1. MY RIGHTS:  To be informed of my treatment and medication plan. To be an active participant in my health and wellbeing. 2. MY RESPONSIBILITY AND UNDERSTANDING FOR USE OF MEDICATIONS  ? I will take medications at the dose and frequency as directed. For my safety, I will not increase or change how I take my medications without the recommendation of my healthcare provider. ? I will actively participate in any program recommended by my provider which may improve function, including social, physical, psychological programs. ? I will not take my medications with alcohol or other drugs not prescribed to me. I understand that drinking alcohol with my medications increases the chances of side effects, including reduced breathing rate and could lead to personal injury when operating machinery. ? I understand that if I have a history of substance use disorders, including alcohol or other illicit drugs, that I may be at increased risk of addiction to my medications. ? I agree to notify my provider immediately if I should become pregnant so that my treatment plan can be adjusted.   ? I agree and understand that I shall only receive controlled substance medications from the prescriber that signed this agreement unless there is written agreement among other prescribers of controlled substances outlining the responsibility of the medications being prescribed. ? I understand that the if the controlled medication is not helping to achieve goals, the dosage may be tapered and no longer prescribed. 3. MY RESPONSIBILITY FOR COMMUNICATION / PRESCRIPTION RENEWALS  ? I agree that all controlled substance medications that I take will be prescribed only by my provider. If another healthcare provider prescribes me medication in an emergency, I will notify my provider within seventy-two (72) hours. ? I will arrange for refills at the prescribed interval ONLY during regular office hours. I will not ask for refills earlier than agreed, after-hours, on holidays or weekends. Refills may take up to 72 hours for processing and prescriptions to reach the pharmacy. ? I will inform my other health care providers that I am taking these medications and of the existence of this [id number]. In the event of an emergency, I will provide the same information to the emergency department prescribers. ? I will keep my provider updated on the pharmacy I am using for controlled medication prescription filling. Initials:_______  4. MY RESPONSIBILITY FOR PROTECTING MEDICATIONS  ? I will protect my prescriptions and medications. I understand that lost or misplaced prescriptions will not be replaced. ? I will keep medications only for my own use and will not share them with others. I will keep all medications away from children. ? I agree that if my medications are adjusted or discontinued, I will properly dispose of any remaining medications. I understand that I will be required to dispose of any remaining controlled medications as, directed by my prescriber, prior to being provided with any prescriptions for other controlled medications.   Medication drop box locations can be found at: [email or web address]    5. MY RESPONSIBILITY WITH ILLEGAL DRUGS   ? I will not use illegal or street drugs or another person's prescription medications not prescribed to me.   ? If there are identified addiction type symptoms, then referral to a program may be provided by my provider and I agree to follow through with this recommendation. 6. MY RESPONSIBILITY FOR COOPERATION WITH INVESTIGATIONS  ? I understand that my provider will comply with any applicable law and may discuss my use and/or possible misuse/abuse of controlled substances and alcohol, as appropriate, with any health care provider involved in my care, pharmacist, or legal authority. ? I authorize my provider and pharmacy to cooperate fully with law enforcement agencies (as permitted by law) in the investigation of any possible misuse, sale, or other diversion of my controlled substances. ? I agree to waive any applicable privilege or right of privacy or confidentiality with respect to these authorizations. 7. PROVIDERS RIGHT TO MONITOR FOR SAFETY: PRESCRIPTION MONITORING / DRUG TESTING  ? I consent to drug/toxicology screening and will submit to a drug screen upon my providers request to assure I am only taking the prescribed drugs for my safety monitoring. I understand that a drug screen is a laboratory test in which a sample of my urine, blood or saliva is checked to see what drugs I have been taking. This may entail an observed urine specimen, which means that a nurse or other health care provider may watch me provide urine, and I will cooperate if I am asked to provide an observed specimen. ? I understand that my provider will check a copy of my State Prescription Monitoring Program () Report in order to safely prescribe medications. ? Pill Counts: I consent to pill counts when requested.   I may be asked to bring all my prescribed controlled substance medications, in their original bottles, to all of my scheduled appointments. In addition, my provider may ask me to come to the practice at any time for a random pill count. 8. TERMINATION OF THIS AGREEMENT  For my safety, my prescriber has the right to stop prescribing controlled substance medications and may end this agreement. Initials:_______  ? Conditions that may result in termination of this agreement:  a. I do not show any improvement in pain, or my activity has not improved. b. I develop rapid tolerance or loss of improvement, as described in my treatment plan.  c. I develop significant side effects from the medication. d. My behavior is not consistent with the responsibilities outlined above, thereby causing safety concerns to continue prescribing controlled substance medications. e. I fail to follow the terms of this agreement. f. Other:____________________________       UNDERSTANDING THIS MEDICATION AGREEMENT:    I have read the above and have had all my questions answered. For chronic disease management, I know that my symptoms can be managed with many types of treatments. A chronic medication trial may be part of my treatment, but I must be an active participant in my care. Medication therapy is only one part of my symptom management plan. In some cases, there may be limited scientific evidence to support the chronic use of certain medications to improve symptoms and daily function. Furthermore, in certain circumstances, there may be scientific information that suggests that the use of chronic controlled substances may worsen my symptoms and increase my risk of unintentional death directly related to this medication therapy. I know that if my provider feels my risk from controlled medications is greater than my benefit, I will have my controlled substance medication(s) compassionately lowered or removed altogether.

## 2020-11-30 RX ORDER — ROSUVASTATIN CALCIUM 10 MG/1
10 TABLET, COATED ORAL NIGHTLY
Qty: 30 TABLET | Refills: 0 | Status: SHIPPED | OUTPATIENT
Start: 2020-11-30 | End: 2021-03-01

## 2020-11-30 RX ORDER — FLUOXETINE HYDROCHLORIDE 40 MG/1
40 CAPSULE ORAL DAILY
Qty: 30 CAPSULE | Refills: 0 | Status: SHIPPED | OUTPATIENT
Start: 2020-11-30 | End: 2020-12-29 | Stop reason: SDUPTHER

## 2020-11-30 NOTE — TELEPHONE ENCOUNTER
Kayley Wellington called requesting a refill of the below medication which has been pended for you:     Requested Prescriptions     Pending Prescriptions Disp Refills    rosuvastatin (CRESTOR) 10 MG tablet 30 tablet 2     Sig: Take 1 tablet by mouth nightly    FLUoxetine (PROZAC) 40 MG capsule 30 capsule 1     Sig: Take 1 capsule by mouth daily       Last Appointment Date: 9/23/2020  Next Appointment Date: 12/7/2020    Allergies   Allergen Reactions    Morphine And Related

## 2020-11-30 NOTE — TELEPHONE ENCOUNTER
Alicia Emanuel called requesting a refill of the below medication which has been pended for you:     Requested Prescriptions     Pending Prescriptions Disp Refills    rosuvastatin (CRESTOR) 10 MG tablet 30 tablet 2     Sig: Take 1 tablet by mouth nightly       Last Appointment Date: 9/23/2020  Next Appointment Date: 12/7/2020    Allergies   Allergen Reactions    Morphine And Related

## 2020-12-01 ENCOUNTER — TELEPHONE (OUTPATIENT)
Dept: INTERNAL MEDICINE | Age: 60
End: 2020-12-01

## 2020-12-04 DIAGNOSIS — I10 ESSENTIAL HYPERTENSION: ICD-10-CM

## 2020-12-04 DIAGNOSIS — E55.9 VITAMIN D DEFICIENCY: ICD-10-CM

## 2020-12-04 DIAGNOSIS — M81.6 LOCALIZED OSTEOPOROSIS WITHOUT CURRENT PATHOLOGICAL FRACTURE: ICD-10-CM

## 2020-12-04 DIAGNOSIS — M25.562 CHRONIC PAIN OF BOTH KNEES: ICD-10-CM

## 2020-12-04 DIAGNOSIS — E78.00 PURE HYPERCHOLESTEROLEMIA: ICD-10-CM

## 2020-12-04 DIAGNOSIS — F51.01 PRIMARY INSOMNIA: ICD-10-CM

## 2020-12-04 DIAGNOSIS — G89.29 CHRONIC PAIN OF BOTH KNEES: ICD-10-CM

## 2020-12-04 DIAGNOSIS — M25.561 CHRONIC PAIN OF BOTH KNEES: ICD-10-CM

## 2020-12-04 LAB
ALBUMIN SERPL-MCNC: 4.3 G/DL (ref 3.5–5.2)
ALP BLD-CCNC: 103 U/L (ref 35–104)
ALT SERPL-CCNC: 17 U/L (ref 5–33)
AMPHETAMINE SCREEN, URINE: NEGATIVE
ANION GAP SERPL CALCULATED.3IONS-SCNC: 11 MMOL/L (ref 7–19)
AST SERPL-CCNC: 20 U/L (ref 5–32)
BACTERIA: NEGATIVE /HPF
BARBITURATE SCREEN URINE: NEGATIVE
BASOPHILS ABSOLUTE: 0.1 K/UL (ref 0–0.2)
BASOPHILS RELATIVE PERCENT: 1 % (ref 0–1)
BENZODIAZEPINE SCREEN, URINE: NEGATIVE
BILIRUB SERPL-MCNC: 0.9 MG/DL (ref 0.2–1.2)
BILIRUBIN URINE: NEGATIVE
BLOOD, URINE: ABNORMAL
BUN BLDV-MCNC: 21 MG/DL (ref 8–23)
CALCIUM SERPL-MCNC: 9.3 MG/DL (ref 8.8–10.2)
CANNABINOID SCREEN URINE: NEGATIVE
CHLORIDE BLD-SCNC: 107 MMOL/L (ref 98–111)
CHOLESTEROL, TOTAL: 163 MG/DL (ref 160–199)
CLARITY: CLEAR
CO2: 25 MMOL/L (ref 22–29)
COCAINE METABOLITE SCREEN URINE: NEGATIVE
COLOR: YELLOW
CREAT SERPL-MCNC: 0.9 MG/DL (ref 0.5–0.9)
CRYSTALS, UA: ABNORMAL /HPF
EOSINOPHILS ABSOLUTE: 0.4 K/UL (ref 0–0.6)
EOSINOPHILS RELATIVE PERCENT: 7.1 % (ref 0–5)
EPITHELIAL CELLS, UA: 2 /HPF (ref 0–5)
GFR AFRICAN AMERICAN: >59
GFR NON-AFRICAN AMERICAN: >60
GLUCOSE BLD-MCNC: 91 MG/DL (ref 74–109)
GLUCOSE URINE: NEGATIVE MG/DL
HCT VFR BLD CALC: 42.4 % (ref 37–47)
HDLC SERPL-MCNC: 76 MG/DL (ref 65–121)
HEMOGLOBIN: 13.2 G/DL (ref 12–16)
HYALINE CASTS: 1 /HPF (ref 0–8)
IMMATURE GRANULOCYTES #: 0 K/UL
KETONES, URINE: ABNORMAL MG/DL
LDL CHOLESTEROL CALCULATED: 74 MG/DL
LEUKOCYTE ESTERASE, URINE: NEGATIVE
LYMPHOCYTES ABSOLUTE: 1.6 K/UL (ref 1.1–4.5)
LYMPHOCYTES RELATIVE PERCENT: 26.9 % (ref 20–40)
Lab: NORMAL
MCH RBC QN AUTO: 27.4 PG (ref 27–31)
MCHC RBC AUTO-ENTMCNC: 31.1 G/DL (ref 33–37)
MCV RBC AUTO: 88 FL (ref 81–99)
MONOCYTES ABSOLUTE: 0.4 K/UL (ref 0–0.9)
MONOCYTES RELATIVE PERCENT: 6.9 % (ref 0–10)
NEUTROPHILS ABSOLUTE: 3.4 K/UL (ref 1.5–7.5)
NEUTROPHILS RELATIVE PERCENT: 57.8 % (ref 50–65)
NITRITE, URINE: NEGATIVE
OPIATE SCREEN URINE: NEGATIVE
PDW BLD-RTO: 14.7 % (ref 11.5–14.5)
PH UA: 5.5 (ref 5–8)
PLATELET # BLD: 235 K/UL (ref 130–400)
PMV BLD AUTO: 9.8 FL (ref 9.4–12.3)
POTASSIUM SERPL-SCNC: 4.5 MMOL/L (ref 3.5–5)
PROTEIN UA: NEGATIVE MG/DL
RBC # BLD: 4.82 M/UL (ref 4.2–5.4)
RBC UA: 1 /HPF (ref 0–4)
SODIUM BLD-SCNC: 143 MMOL/L (ref 136–145)
SPECIFIC GRAVITY UA: 1.03 (ref 1–1.03)
TOTAL PROTEIN: 6.9 G/DL (ref 6.6–8.7)
TRIGL SERPL-MCNC: 64 MG/DL (ref 0–149)
TSH SERPL DL<=0.05 MIU/L-ACNC: 1.25 UIU/ML (ref 0.27–4.2)
UROBILINOGEN, URINE: 1 E.U./DL
VITAMIN D 25-HYDROXY: 42.5 NG/ML
WBC # BLD: 5.9 K/UL (ref 4.8–10.8)
WBC UA: 1 /HPF (ref 0–5)

## 2020-12-07 ENCOUNTER — OFFICE VISIT (OUTPATIENT)
Dept: INTERNAL MEDICINE | Age: 60
End: 2020-12-07
Payer: COMMERCIAL

## 2020-12-07 VITALS
OXYGEN SATURATION: 98 % | RESPIRATION RATE: 18 BRPM | HEART RATE: 68 BPM | SYSTOLIC BLOOD PRESSURE: 118 MMHG | BODY MASS INDEX: 29.99 KG/M2 | WEIGHT: 180 LBS | DIASTOLIC BLOOD PRESSURE: 80 MMHG | HEIGHT: 65 IN

## 2020-12-07 PROCEDURE — 99396 PREV VISIT EST AGE 40-64: CPT | Performed by: INTERNAL MEDICINE

## 2020-12-07 RX ORDER — HYDROCODONE BITARTRATE AND ACETAMINOPHEN 7.5; 325 MG/1; MG/1
1 TABLET ORAL DAILY PRN
Qty: 30 TABLET | Refills: 0 | Status: SHIPPED | OUTPATIENT
Start: 2020-12-07 | End: 2021-03-10 | Stop reason: SDUPTHER

## 2020-12-07 RX ORDER — ERGOCALCIFEROL 1.25 MG/1
CAPSULE ORAL
Qty: 4 CAPSULE | Refills: 2 | Status: SHIPPED | OUTPATIENT
Start: 2020-12-07 | End: 2021-03-03

## 2020-12-07 ASSESSMENT — ENCOUNTER SYMPTOMS
DIARRHEA: 0
NAUSEA: 0
EYE REDNESS: 0
SORE THROAT: 0
EYE PAIN: 0
TROUBLE SWALLOWING: 0
CHEST TIGHTNESS: 0
BACK PAIN: 1
VOMITING: 0
WHEEZING: 0
BLOOD IN STOOL: 0
VOICE CHANGE: 0
SINUS PRESSURE: 0
ABDOMINAL PAIN: 0
COUGH: 0
CONSTIPATION: 0
COLOR CHANGE: 0

## 2020-12-07 NOTE — PROGRESS NOTES
Chief Complaint:   Arcadio Walsh is a 63 y.o. female who presents forcomplete physical exam.    History of Present Illness:      Arcadio Walsh is a 63 y.o. female who presents todayfor wellness visit AND follow up on her chronic medical conditions as noted below. Essential hypertension-Patient reports her Bp has been well controlled ( systolic below 194; diastolic below 90) at home when checked with home/ store equipment. No side effects related to blood pressure medications were reported by patient     Pure hypercholesterolemia-  Patient  has tried to follow diet recommendations.  Has been taking  cholesterol lowering medication as prescribed and does not report any side effects.     Vitamin D deficiency- has been taking vit d supplement     Generalized anxiety disorder- fluoxetine is helping     Low back pain  Takes hydrocodone prn only         Patient Active Problem List    Diagnosis Date Noted    Chronic pain of both knees 08/01/2018    Facial skin lesion 08/01/2018    Right temporal headache 09/21/2017    Vision changes 09/21/2017    Pure hypercholesterolemia 09/10/2017    Vitamin D deficiency 09/10/2017    Essential hypertension 09/10/2017    Localized osteoporosis without current pathological fracture 09/10/2017     2/14 Lspine -2.1/ hi neck -2.6; 1/2017 Lspine -1.8; hip neck -2.6      Rheumatoid arthritis involving both hands with positive rheumatoid factor ([facility] [address] 09/10/2017     Dx 2011 RA/ CCP ne      Primary insomnia 09/10/2017    Generalized anxiety disorder 09/10/2017    Reactive depression 09/10/2017       Past Medical History:   Diagnosis Date    Depression     GERD (gastroesophageal reflux disease)     Headache     Hyperlipidemia     Hypertension     Insomnia     Vitamin D deficiency        Past Surgical History:   Procedure Laterality Date    ADENOIDECTOMY      BREAST BIOPSY      two    COLONOSCOPY      OVARY REMOVAL      right    TONSILLECTOMY         Current Outpatient Medications   Medication Sig Dispense Refill    HYDROcodone-acetaminophen (NORCO) 7.5-325 MG per tablet Take 1 tablet by mouth daily as needed for Pain for up to 30 days. 30 tablet 0    vitamin D (ERGOCALCIFEROL) 1.25 MG (50787 UT) CAPS capsule TAKE ONE CAPSULE BY MOUTH ONCE WEEKLY 4 capsule 2    rosuvastatin (CRESTOR) 10 MG tablet Take 1 tablet by mouth nightly 30 tablet 0    FLUoxetine (PROZAC) 40 MG capsule Take 1 capsule by mouth daily 30 capsule 0    busPIRone (BUSPAR) 15 MG tablet TAKE 1 TABLET BY MOUTH TWICE DAILY AS NEEDED FOR ANXIETY 60 tablet 2    fluticasone (FLONASE) 50 MCG/ACT nasal spray SHAKE LIQUID AND USE 2 SPRAYS IN EACH NOSTRIL DAILY 16 g 2    metoprolol succinate (TOPROL XL) 50 MG extended release tablet TAKE 1 TABLET BY MOUTH DAILY 30 tablet 2    pantoprazole (PROTONIX) 40 MG tablet TAKE 1 TABLET BY MOUTH EVERY DAY 30 tablet 2    triamterene-hydroCHLOROthiazide (MAXZIDE-25) 37.5-25 MG per tablet Take 1 tablet by mouth daily 30 tablet 2    Cholecalciferol (VITAMIN D3) 2000 units CAPS Take by mouth      aspirin 81 MG chewable tablet Take 81 mg by mouth daily       No current facility-administered medications for this visit. Allergies   Allergen Reactions    Morphine And Related        Social History     Socioeconomic History    Marital status:      Spouse name: None    Number of children: 1    Years of education: None    Highest education level: None   Occupational History    Occupation: [employer]    Social Needs    Financial resource strain: None    Food insecurity     Worry: None     Inability: None    Transportation needs     Medical: None     Non-medical: None   Tobacco Use    Smoking status: Never Smoker    Smokeless tobacco: Never Used   Substance and Sexual Activity    Alcohol use:  Yes    Drug use: No    Sexual activity: Never   Lifestyle    Physical activity     Days per week: None     Minutes per session: None    Stress: None Relationships    Social connections     Talks on phone: None     Gets together: None     Attends Orthodoxy service: None     Active member of club or organization: None     Attends meetings of clubs or organizations: None     Relationship status: None    Intimate partner violence     Fear of current or ex partner: None     Emotionally abused: None     Physically abused: None     Forced sexual activity: None   Other Topics Concern    None   Social History Narrative    None     Family History   Problem Relation Age of Onset    Lung Cancer Father     Colon Cancer Sister 62        COLON    Other Sister         PANCREATIC CANCER    Diabetes Brother     Coronary Art [employer] Brother           Past Surgical History:   Procedure Laterality Date    ADENOIDECTOMY      BREAST BIOPSY      two    COLONOSCOPY      OVARY REMOVAL      right    TONSILLECTOMY           Lab Review   Orders Only on 12/04/2020   Component Date Value    Amphetamine Screen, Urine 12/04/2020 Negative     Barbiturate Screen, Ur 12/04/2020 Negative     Benzodiazepine Screen, U* 12/04/2020 Negative     Cannabinoid Scrn, Ur 12/04/2020 Negative     Cocaine Metabolite Scree* 12/04/2020 Negative     Opiate Scrn, Ur 12/04/2020 Negative     Drug Screen Comment: 12/04/2020 see below     Vit D, 25-Hydroxy 12/04/2020 42.5     Color, UA 12/04/2020 YELLOW     Clarity, UA 12/04/2020 Clear     Glucose, Ur 12/04/2020 Negative     Bilirubin Urine 12/04/2020 Negative     Ketones, Urine 12/04/2020 TRACE*    Specific [place], UA 12/04/2020 1.027     Blood, Urine 12/04/2020 TRACE*    pH, UA 12/04/2020 5.5     Protein, UA 12/04/2020 Negative     Urobilinogen, Urine 12/04/2020 1.0     Nitrite, Urine 12/04/2020 Negative     Leukocyte Esterase, Urine 12/04/2020 Negative     TSH 12/04/2020 1.250     Cholesterol, Total 12/04/2020 163     Triglycerides 12/04/2020 64     HDL 12/04/2020 76     LDL Calculated 12/04/2020 74     Sodium 12/04/2020 143  Potassium 12/04/2020 4.5     Chloride 12/04/2020 107     CO2 12/04/2020 25     Anion Gap 12/04/2020 11     Glucose 12/04/2020 91     BUN 12/04/2020 21     CREATININE 12/04/2020 0.9     GFR Non- 12/04/2020 >60     GFR  12/04/2020 >59     Calcium 12/04/2020 9.3     Total Protein 12/04/2020 6.9     Alb 12/04/2020 4.3     Total Bilirubin 12/04/2020 0.9     Alkaline Phosphatase 12/04/2020 103     ALT 12/04/2020 17     AST 12/04/2020 20     WBC 12/04/2020 5.9     RBC 12/04/2020 4.82     Hemoglobin 12/04/2020 13.2     Hematocrit 12/04/2020 42.4     MCV 12/04/2020 88.0     MCH 12/04/2020 27.4     MCHC 12/04/2020 31.1*    RDW 12/04/2020 14.7*    Platelets 80/66/7456 235     MPV 12/04/2020 9.8     Neutrophils % 12/04/2020 57.8     Lymphocytes % 12/04/2020 26.9     Monocytes % 12/04/2020 6.9     Eosinophils % 12/04/2020 7.1*    Basophils % 12/04/2020 1.0     Neutrophils Absolute 12/04/2020 3.4     Immature Granulocytes # 12/04/2020 0.0     Lymphocytes Absolute 12/04/2020 1.6     Monocytes Absolute 12/04/2020 0.40     Eosinophils Absolute 12/04/2020 0.40     Basophils Absolute 12/04/2020 0.10     Bacteria, UA 12/04/2020 NEGATIVE*    Crystals, UA 12/04/2020 NEG*    Hyaline Casts, UA 12/04/2020 1     WBC, UA 12/04/2020 1     RBC, UA 12/04/2020 1     Epithelial Cells, UA 12/04/2020 2          Review of Systems   Constitutional: Positive for fatigue. Negative for chills and fever. HENT: Negative for congestion, ear pain, postnasal drip, sinus pressure, sore throat, trouble swallowing and voice change. Eyes: Negative for pain, redness and visual disturbance. Respiratory: Negative for cough, chest tightness and wheezing. Cardiovascular: Negative for chest pain, palpitations and leg swelling. Gastrointestinal: Negative for abdominal pain, blood in stool, constipation, diarrhea, nausea and vomiting.    Endocrine: Negative for polydipsia and polyuria. Genitourinary: Negative for dysuria, enuresis, flank pain, frequency and urgency. Musculoskeletal: Positive for arthralgias and back pain. Negative for gait problem and joint swelling. Skin: Negative for color change and rash. Neurological: Negative for dizziness, tremors, syncope, facial asymmetry, speech difficulty, weakness, numbness and headaches. Psychiatric/Behavioral: Positive for sleep disturbance. Negative for agitation, behavioral problems, confusion and suicidal ideas. The patient is not nervous/anxious. Vitals:    12/07/20 1048   BP: 118/80   Site: Left Upper Arm   Position: Sitting   Cuff Size: Large Adult   Pulse: 68   Resp: 18   SpO2: 98%   Weight: 180 lb (81.6 kg)   Height: 5' 5\" (1.651 m)      Wt Readings from Last 3 Encounters:   12/07/20 180 lb (81.6 kg)   09/23/20 180 lb (81.6 kg)   11/13/19 173 lb (78.5 kg)   Body mass index is 29.95 kg/m². BP Readings from Last 3 Encounters:   12/07/20 118/80   09/23/20 138/88   11/13/19 138/82       Physical Exam  Vitals signs and nursing note reviewed. Constitutional:       Appearance: She is well-developed. HENT:      Head: Normocephalic and atraumatic. Eyes:      Conjunctiva/sclera: Conjunctivae normal.      Pupils: Pupils are equal, round, and reactive to light. Neck:      Musculoskeletal: Normal range of motion and neck supple. Thyroid: No thyromegaly. Vascular: No JVD. Cardiovascular:      Rate and Rhythm: Normal rate and regular rhythm. Heart sounds: Normal heart sounds. No murmur. Pulmonary:      Effort: Pulmonary effort is normal. No respiratory distress. Breath sounds: Normal breath sounds. No wheezing or rales. Chest:      Chest wall: No tenderness. Abdominal:      General: Bowel sounds are normal. There is no distension. Palpations: Abdomen is soft. There is no mass. Tenderness: There is no abdominal tenderness. There is no guarding or rebound.    Genitourinary:     Vagina: Normal. No signs of injury. No vaginal discharge. Cervix: No cervical motion tenderness or discharge. Uterus: Not deviated, not enlarged and not tender. Adnexa:         Right: No mass. Left: No mass. Musculoskeletal:         General: No tenderness or deformity. Lymphadenopathy:      Cervical: No cervical adenopathy. Skin:     General: Skin is warm and dry. Coloration: Skin is not pale. Findings: No erythema or rash. Neurological:      Mental Status: She is alert and oriented to person, place, and time. Cranial Nerves: No cranial nerve deficit. Motor: No abnormal muscle tone. Deep Tendon Reflexes: Reflexes are normal and symmetric. Reflexes normal.   Psychiatric:         Behavior: Behavior normal.         Thought Content:  Thought content normal.         Judgment: Judgment normal.     Breast exam  Bilateral breast exam- symmetric, no nodules, no lymphadenopathy, no nipple discharge              ASSESSMENT/PLAN    ANNUAL PHYSICAL  * cscope 2015 repeat 5 yr ( family hx)- pt will stef dr Trammell Leader  * mammogram 1/2017 repeat due-order given to the patient  * PAP 1/17 repeat 3 yrs-plan 2020- order placed  * bone density 1/2017-osteopenia, order given to the patient     Essential hypertension- blood pressure has been in good range, we'll continue metoprolol     Pure hypercholesterolemia- LDL improved at 74 in 12/2020 ( 104 in 11/2019,  Cont crestor 10     Vitamin D deficiency-  vitamin D level 42 (33 (42)(29)   Restart vitamin D 50,000 IU weekly        Generalized anxiety disorder-she lost her mother in July 2020, 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 There is also no evidence of aberrant behavior like lost prescriptions or early refill requests or multiple prescribers for controlled substances.     Patient  was advised NOT to attempt to drive a motor vehichle or operate any heavy machinery within 6 hrs of taking the presribed medication - hydrocodone  Savanna Creed 12/2020,   UDS 9/23/20     Patient has not been taking her medications for cholesterol for over 3 months  We are planning wellness visit in December 2020  We will not do lab work until December 2020  She is restarting her medications, diet and weight loss now in September 2020     Orders Placed This Encounter   Procedures    ANGUS DIGITAL SCREEN W OR WO CAD BILATERAL    DEXA BONE DENSITY 2 SITES    PAP SMEAR    Comprehensive Metabolic Panel    Hemoglobin A1C    Lipid Panel    Vitamin D 25 Hydroxy     New Prescriptions    No medications on file      There are no Patient Instructions on file for this visit. No follow-ups on file. EMR Dragon/transcription disclaimer:Significant part of this  encounter note is electronic transcription/translation of spoken language to printed text. The electronic translation of spoken language may beerroneous, or at times, nonsensical words or phrases may be inadvertently transcribed.  Although I have reviewed the note for such errors, some may still exist.

## 2020-12-29 RX ORDER — METOPROLOL SUCCINATE 50 MG/1
TABLET, EXTENDED RELEASE ORAL
Qty: 90 TABLET | Refills: 1 | Status: SHIPPED | OUTPATIENT
Start: 2020-12-29 | End: 2021-07-06

## 2020-12-29 RX ORDER — FLUOXETINE HYDROCHLORIDE 40 MG/1
40 CAPSULE ORAL DAILY
Qty: 90 CAPSULE | Refills: 1 | Status: SHIPPED | OUTPATIENT
Start: 2020-12-29 | End: 2021-07-06

## 2020-12-29 RX ORDER — PANTOPRAZOLE SODIUM 40 MG/1
TABLET, DELAYED RELEASE ORAL
Qty: 90 TABLET | Refills: 1 | Status: SHIPPED | OUTPATIENT
Start: 2020-12-29 | End: 2021-07-06

## 2020-12-29 NOTE — TELEPHONE ENCOUNTER
Gui Willett called requesting a refill of the below medication which has been pended for you:     Requested Prescriptions     Pending Prescriptions Disp Refills    FLUoxetine (PROZAC) 40 MG capsule 90 capsule 1     Sig: Take 1 capsule by mouth daily    metoprolol succinate (TOPROL XL) 50 MG extended release tablet 90 tablet 1     Sig: TAKE 1 TABLET BY MOUTH DAILY    pantoprazole (PROTONIX) 40 MG tablet 90 tablet 1     Sig: TAKE 1 TABLET BY MOUTH EVERY DAY       Last Appointment Date: 12/7/2020  Next Appointment Date: 6/7/2021    Allergies   Allergen Reactions    Morphine And Related

## 2020-12-30 RX ORDER — FLUTICASONE PROPIONATE 50 MCG
SPRAY, SUSPENSION (ML) NASAL
Qty: 16 G | Refills: 2 | Status: SHIPPED | OUTPATIENT
Start: 2020-12-30 | End: 2021-01-26 | Stop reason: SDUPTHER

## 2020-12-30 NOTE — TELEPHONE ENCOUNTER
Magdalena Correa called requesting a refill of the below medication which has been pended for you:     Requested Prescriptions     Pending Prescriptions Disp Refills    fluticasone (FLONASE) 50 MCG/ACT nasal spray 16 g 2     Sig: SHAKE LIQUID AND USE 2 SPRAYS IN EACH NOSTRIL DAILY       Last Appointment Date: 12/7/2020  Next Appointment Date: 6/7/2021    Allergies   Allergen Reactions    Morphine And Related

## 2021-01-26 RX ORDER — FLUTICASONE PROPIONATE 50 MCG
SPRAY, SUSPENSION (ML) NASAL
Qty: 16 G | Refills: 2 | Status: SHIPPED | OUTPATIENT
Start: 2021-01-26 | End: 2022-03-10 | Stop reason: SDUPTHER

## 2021-01-26 NOTE — TELEPHONE ENCOUNTER
Claudette Ramirez called requesting a refill of the below medication which has been pended for you:     Requested Prescriptions     Pending Prescriptions Disp Refills    fluticasone (FLONASE) 50 MCG/ACT nasal spray 16 g 2     Sig: SHAKE LIQUID AND USE 2 SPRAYS IN EACH NOSTRIL DAILY       Last Appointment Date: 12/7/2020  Next Appointment Date: 6/7/2021    Allergies   Allergen Reactions    Morphine And Related

## 2021-03-03 RX ORDER — ERGOCALCIFEROL 1.25 MG/1
CAPSULE ORAL
Qty: 4 CAPSULE | Refills: 2 | Status: SHIPPED | OUTPATIENT
Start: 2021-03-03 | End: 2021-08-27

## 2021-03-03 NOTE — TELEPHONE ENCOUNTER
Willy Cueva called requesting a refill of the below medication which has been pended for you:     Requested Prescriptions     Pending Prescriptions Disp Refills    vitamin D (ERGOCALCIFEROL) 1.25 MG (81932 UT) CAPS capsule [Pharmacy Med Name: VITAMIN D2 50,000IU (ERGO) CAP RX] 4 capsule 2     Sig: TAKE 1 CAPSULE BY MOUTH 1 TIME WEEKLY       Last Appointment Date: 12/7/2020  Next Appointment Date: 6/7/2021    Allergies   Allergen Reactions    Morphine And Related

## 2021-03-10 DIAGNOSIS — M25.561 CHRONIC PAIN OF BOTH KNEES: ICD-10-CM

## 2021-03-10 DIAGNOSIS — M25.562 CHRONIC PAIN OF BOTH KNEES: ICD-10-CM

## 2021-03-10 DIAGNOSIS — G89.29 CHRONIC PAIN OF BOTH KNEES: ICD-10-CM

## 2021-03-10 RX ORDER — HYDROCODONE BITARTRATE AND ACETAMINOPHEN 7.5; 325 MG/1; MG/1
1 TABLET ORAL DAILY PRN
Qty: 30 TABLET | Refills: 0 | Status: SHIPPED | OUTPATIENT
Start: 2021-03-10 | End: 2021-06-07 | Stop reason: SDUPTHER

## 2021-03-10 RX ORDER — ROSUVASTATIN CALCIUM 10 MG/1
TABLET, COATED ORAL
Qty: 90 TABLET | Refills: 0 | Status: SHIPPED | OUTPATIENT
Start: 2021-03-10 | End: 2021-06-07

## 2021-03-10 NOTE — TELEPHONE ENCOUNTER
Virginia Plaza called to request a refill on her medication. Last office visit : 12/7/2020   Next office visit : 6/7/2021     Last UDS:   Amphetamine Screen, Urine   Date Value Ref Range Status   12/04/2020 Negative Negative <1000 ng/mL Final     Barbiturate Screen, Urine   Date Value Ref Range Status   12/06/2018 neg  Final     Benzodiazepine Screen, Urine   Date Value Ref Range Status   12/04/2020 Negative Negative <100 ng/mL Final     Buprenorphine Urine   Date Value Ref Range Status   12/06/2018 neg  Final     Cocaine Metabolite Screen, Urine   Date Value Ref Range Status   12/04/2020 Negative Negative <300 ng/mL Final     Gabapentin Screen, Urine   Date Value Ref Range Status   12/06/2018 neg  Final     Methamphetamine, Urine   Date Value Ref Range Status   12/06/2018 neg  Final     Opiate Scrn, Ur   Date Value Ref Range Status   12/04/2020 Negative Negative < 300 ng/mL Final     Oxycodone Screen, Ur   Date Value Ref Range Status   12/06/2018 neg  Final     PCP Screen, Urine   Date Value Ref Range Status   12/06/2018 neg  Final     Propoxyphene Screen, Urine   Date Value Ref Range Status   12/06/2018 neg  Final     THC Screen, Urine   Date Value Ref Range Status   12/06/2018 neg  Final     Tricyclic Antidepressants, Urine   Date Value Ref Range Status   12/06/2018 neg  Final       Last Prabhu Moran: 03/10/2021  Medication Contract: 09/13/2020    Requested Prescriptions     Pending Prescriptions Disp Refills    rosuvastatin (CRESTOR) 10 MG tablet 90 tablet 1     Sig: TAKE 1 TABLET BY MOUTH EVERY NIGHT    HYDROcodone-acetaminophen (NORCO) 7.5-325 MG per tablet 30 tablet 0     Sig: Take 1 tablet by mouth daily as needed for Pain for up to 30 days. Please approve or refuse this medication.    Leopoldo Gaitan

## 2021-04-01 ENCOUNTER — TELEPHONE (OUTPATIENT)
Dept: INTERNAL MEDICINE | Age: 61
End: 2021-04-01

## 2021-04-01 DIAGNOSIS — Z12.31 ENCOUNTER FOR SCREENING MAMMOGRAM FOR BREAST CANCER: ICD-10-CM

## 2021-04-01 DIAGNOSIS — M81.6 LOCALIZED OSTEOPOROSIS WITHOUT CURRENT PATHOLOGICAL FRACTURE: ICD-10-CM

## 2021-04-01 DIAGNOSIS — M81.6 LOCALIZED OSTEOPOROSIS WITHOUT CURRENT PATHOLOGICAL FRACTURE: Primary | ICD-10-CM

## 2021-04-01 NOTE — TELEPHONE ENCOUNTER
----- Message from Lambert Holt MD sent at 4/1/2021  1:13 PM CDT -----  Her bone density has further declined  She has osteoporosis  Slight decline compared to previous bone density test  Recommend to start RX  Recommendations at this time  #1 make sure  she takes her vitamin D supplement  #2 exercise, specifically weightbearing exercise is recommended

## 2021-04-26 DIAGNOSIS — M81.6 LOCALIZED OSTEOPOROSIS WITHOUT CURRENT PATHOLOGICAL FRACTURE: ICD-10-CM

## 2021-05-05 DIAGNOSIS — M81.6 LOCALIZED OSTEOPOROSIS WITHOUT CURRENT PATHOLOGICAL FRACTURE: ICD-10-CM

## 2021-05-07 ENCOUNTER — IMMUNIZATION (OUTPATIENT)
Age: 61
End: 2021-05-07
Payer: COMMERCIAL

## 2021-05-07 PROCEDURE — 91300 COVID-19, PFIZER VACCINE 30MCG/0.3ML DOSE: CPT | Performed by: FAMILY MEDICINE

## 2021-05-07 PROCEDURE — 0001A PR IMM ADMN SARSCOV2 30MCG/0.3ML DIL RECON 1ST DOSE: CPT | Performed by: FAMILY MEDICINE

## 2021-05-28 ENCOUNTER — IMMUNIZATION (OUTPATIENT)
Age: 61
End: 2021-05-28
Payer: COMMERCIAL

## 2021-05-28 PROCEDURE — 91300 COVID-19, PFIZER VACCINE 30MCG/0.3ML DOSE: CPT | Performed by: FAMILY MEDICINE

## 2021-05-28 PROCEDURE — 0002A PR IMM ADMN SARSCOV2 30MCG/0.3ML DIL RECON 2ND DOSE: CPT | Performed by: FAMILY MEDICINE

## 2021-06-04 DIAGNOSIS — Z12.31 ENCOUNTER FOR SCREENING MAMMOGRAM FOR BREAST CANCER: ICD-10-CM

## 2021-06-04 DIAGNOSIS — E78.00 PURE HYPERCHOLESTEROLEMIA: ICD-10-CM

## 2021-06-04 DIAGNOSIS — F41.1 GENERALIZED ANXIETY DISORDER: ICD-10-CM

## 2021-06-04 DIAGNOSIS — M81.6 LOCALIZED OSTEOPOROSIS WITHOUT CURRENT PATHOLOGICAL FRACTURE: ICD-10-CM

## 2021-06-04 DIAGNOSIS — M25.562 CHRONIC PAIN OF BOTH KNEES: ICD-10-CM

## 2021-06-04 DIAGNOSIS — F51.01 PRIMARY INSOMNIA: ICD-10-CM

## 2021-06-04 DIAGNOSIS — I10 ESSENTIAL HYPERTENSION: ICD-10-CM

## 2021-06-04 DIAGNOSIS — G89.29 CHRONIC PAIN OF BOTH KNEES: ICD-10-CM

## 2021-06-04 DIAGNOSIS — Z12.4 ENCOUNTER FOR PAPANICOLAOU SMEAR FOR CERVICAL CANCER SCREENING: ICD-10-CM

## 2021-06-04 DIAGNOSIS — E55.9 VITAMIN D DEFICIENCY: ICD-10-CM

## 2021-06-04 DIAGNOSIS — Z00.00 ANNUAL PHYSICAL EXAM: ICD-10-CM

## 2021-06-04 DIAGNOSIS — M25.561 CHRONIC PAIN OF BOTH KNEES: ICD-10-CM

## 2021-06-04 LAB
ALBUMIN SERPL-MCNC: 4.3 G/DL (ref 3.5–5.2)
ALP BLD-CCNC: 111 U/L (ref 35–104)
ALT SERPL-CCNC: 18 U/L (ref 5–33)
ANION GAP SERPL CALCULATED.3IONS-SCNC: 10 MMOL/L (ref 7–19)
AST SERPL-CCNC: 22 U/L (ref 5–32)
BILIRUB SERPL-MCNC: 0.9 MG/DL (ref 0.2–1.2)
BUN BLDV-MCNC: 15 MG/DL (ref 8–23)
CALCIUM SERPL-MCNC: 9.4 MG/DL (ref 8.8–10.2)
CHLORIDE BLD-SCNC: 104 MMOL/L (ref 98–111)
CHOLESTEROL, TOTAL: 182 MG/DL (ref 160–199)
CO2: 25 MMOL/L (ref 22–29)
CREAT SERPL-MCNC: 0.9 MG/DL (ref 0.5–0.9)
GFR AFRICAN AMERICAN: >59
GFR NON-AFRICAN AMERICAN: >60
GLUCOSE BLD-MCNC: 91 MG/DL (ref 74–109)
HBA1C MFR BLD: 4.6 % (ref 4–6)
HDLC SERPL-MCNC: 72 MG/DL (ref 65–121)
LDL CHOLESTEROL CALCULATED: 88 MG/DL
POTASSIUM SERPL-SCNC: 3.9 MMOL/L (ref 3.5–5)
SODIUM BLD-SCNC: 139 MMOL/L (ref 136–145)
TOTAL PROTEIN: 6.9 G/DL (ref 6.6–8.7)
TRIGL SERPL-MCNC: 111 MG/DL (ref 0–149)
VITAMIN D 25-HYDROXY: 40.7 NG/ML

## 2021-06-07 ENCOUNTER — OFFICE VISIT (OUTPATIENT)
Dept: INTERNAL MEDICINE | Age: 61
End: 2021-06-07
Payer: COMMERCIAL

## 2021-06-07 VITALS
HEART RATE: 86 BPM | SYSTOLIC BLOOD PRESSURE: 132 MMHG | DIASTOLIC BLOOD PRESSURE: 86 MMHG | WEIGHT: 185 LBS | RESPIRATION RATE: 18 BRPM | BODY MASS INDEX: 30.82 KG/M2 | OXYGEN SATURATION: 98 % | HEIGHT: 65 IN

## 2021-06-07 DIAGNOSIS — I10 ESSENTIAL HYPERTENSION: Primary | ICD-10-CM

## 2021-06-07 DIAGNOSIS — G89.29 CHRONIC PAIN OF BOTH KNEES: ICD-10-CM

## 2021-06-07 DIAGNOSIS — E78.00 PURE HYPERCHOLESTEROLEMIA: ICD-10-CM

## 2021-06-07 DIAGNOSIS — M25.561 CHRONIC PAIN OF BOTH KNEES: ICD-10-CM

## 2021-06-07 DIAGNOSIS — E55.9 VITAMIN D DEFICIENCY: ICD-10-CM

## 2021-06-07 DIAGNOSIS — M25.562 CHRONIC PAIN OF BOTH KNEES: ICD-10-CM

## 2021-06-07 DIAGNOSIS — F41.1 GENERALIZED ANXIETY DISORDER: ICD-10-CM

## 2021-06-07 DIAGNOSIS — M81.6 LOCALIZED OSTEOPOROSIS WITHOUT CURRENT PATHOLOGICAL FRACTURE: ICD-10-CM

## 2021-06-07 DIAGNOSIS — F51.01 PRIMARY INSOMNIA: ICD-10-CM

## 2021-06-07 PROCEDURE — 99214 OFFICE O/P EST MOD 30 MIN: CPT | Performed by: INTERNAL MEDICINE

## 2021-06-07 RX ORDER — HYDROCODONE BITARTRATE AND ACETAMINOPHEN 7.5; 325 MG/1; MG/1
1 TABLET ORAL DAILY PRN
Qty: 30 TABLET | Refills: 0 | Status: SHIPPED | OUTPATIENT
Start: 2021-06-07 | End: 2021-06-08 | Stop reason: SDUPTHER

## 2021-06-07 RX ORDER — ROSUVASTATIN CALCIUM 10 MG/1
TABLET, COATED ORAL
Qty: 90 TABLET | Refills: 0 | Status: SHIPPED | OUTPATIENT
Start: 2021-06-07 | Stop reason: SDUPTHER

## 2021-06-07 SDOH — ECONOMIC STABILITY: FOOD INSECURITY: WITHIN THE PAST 12 MONTHS, YOU WORRIED THAT YOUR FOOD WOULD RUN OUT BEFORE YOU GOT MONEY TO BUY MORE.: NEVER TRUE

## 2021-06-07 SDOH — ECONOMIC STABILITY: FOOD INSECURITY: WITHIN THE PAST 12 MONTHS, THE FOOD YOU BOUGHT JUST DIDN'T LAST AND YOU DIDN'T HAVE MONEY TO GET MORE.: NEVER TRUE

## 2021-06-07 ASSESSMENT — ENCOUNTER SYMPTOMS
COUGH: 0
SORE THROAT: 0
WHEEZING: 0
CHEST TIGHTNESS: 0
CONSTIPATION: 0
ABDOMINAL PAIN: 0

## 2021-06-07 ASSESSMENT — SOCIAL DETERMINANTS OF HEALTH (SDOH): HOW HARD IS IT FOR YOU TO PAY FOR THE VERY BASICS LIKE FOOD, HOUSING, MEDICAL CARE, AND HEATING?: NOT HARD AT ALL

## 2021-06-07 NOTE — TELEPHONE ENCOUNTER
Kaykay Morales called requesting a refill of the below medication which has been pended for you:     Requested Prescriptions     Pending Prescriptions Disp Refills    rosuvastatin (CRESTOR) 10 MG tablet [Pharmacy Med Name: ROSUVASTATIN 10MG TABLETS] 90 tablet 0     Sig: TAKE 1 TABLET BY MOUTH EVERY NIGHT       Last Appointment Date: 12/7/2020  Next Appointment Date: 6/7/2021    Allergies   Allergen Reactions    Morphine And Related

## 2021-06-07 NOTE — PROGRESS NOTES
Chief Complaint   Patient presents with    6 Month Follow-Up     PT needs to discuss the prolia injection, also pt is unsure if she is to be taking the Maxide. States that she has not been taking it     History of presenting illness:  Nati Daigle is a58 y.o. female who presents today for follow up on her chronic medical conditions as noted below. Essential hypertension-Patient reports her Bp has been well controlled ( systolic below 320; diastolic below 90) at home when checked with home/ store equipment. No side effects related to blood pressure medications were reported by patient     Pure hypercholesterolemia-  Patient  has tried to follow diet recommendations.  Has been taking  cholesterol lowering medication as prescribed and does not report any side effects.     Vitamin D deficiency- has been taking vit d supplement     Generalized anxiety disorder- fluoxetine is helping     Low back pain  Takes hydrocodone prn only    Patient Active Problem List    Diagnosis Date Noted    Chronic pain of both knees 08/01/2018    Facial skin lesion 08/01/2018    Right temporal headache 09/21/2017    Vision changes 09/21/2017    Pure hypercholesterolemia 09/10/2017    Vitamin D deficiency 09/10/2017    Essential hypertension 09/10/2017    Localized osteoporosis without current pathological fracture 09/10/2017     Overview Note:     2/14 Lspine -2.1/ hi neck -2.6; 1/2017 Lspine -1.8; hip neck -2.6  3/2021 hip neck -2.8; L spine -1.8      Rheumatoid arthritis involving both hands with positive rheumatoid factor ([facility] [address] 09/10/2017     Overview Note:     Dx 2011 RA/ CCP ne      Primary insomnia 09/10/2017    Generalized anxiety disorder 09/10/2017    Reactive depression 09/10/2017     Past Medical History:   Diagnosis Date    Depression     GERD (gastroesophageal reflux disease)     Headache     Hyperlipidemia     Hypertension     Insomnia     Vitamin D deficiency       Past Surgical History: Negative for congestion, ear pain, nosebleeds, postnasal drip and sore throat. Respiratory: Negative for cough, chest tightness and wheezing. Cardiovascular: Negative for chest pain, palpitations and leg swelling. Gastrointestinal: Negative for abdominal pain and constipation. Genitourinary: Negative for dysuria and urgency. Musculoskeletal: Negative. Negative for arthralgias. Skin: Negative for rash. Neurological: Negative for dizziness and headaches. Psychiatric/Behavioral: Negative. Vitals:    06/07/21 1107   BP: 132/86   Site: Left Upper Arm   Position: Sitting   Cuff Size: Large Adult   Pulse: 86   Resp: 18   SpO2: 98%   Weight: 185 lb (83.9 kg)   Height: 5' 5\" (1.651 m)     Body mass index is 30.79 kg/m². Physical Exam  Constitutional:       General: She is not in acute distress. Appearance: She is well-developed. She is not diaphoretic. HENT:      Head: Normocephalic and atraumatic. Nose: Nose normal.   Eyes:      General: No scleral icterus. Right eye: No discharge. Left eye: No discharge. Conjunctiva/sclera: Conjunctivae normal.      Pupils: Pupils are equal, round, and reactive to light. Neck:      Thyroid: No thyromegaly. Vascular: No JVD. Cardiovascular:      Rate and Rhythm: Normal rate. Heart sounds: No murmur heard. Pulmonary:      Breath sounds: No wheezing or rales. Chest:      Chest wall: No tenderness. Abdominal:      General: Bowel sounds are normal. There is no distension. Tenderness: There is no guarding. Lymphadenopathy:      Cervical: No cervical adenopathy. Skin:     General: Skin is dry. Findings: No erythema or rash. Neurological:      Cranial Nerves: No cranial nerve deficit. Coordination: Coordination normal.      Deep Tendon Reflexes: Reflexes are normal and symmetric. Psychiatric:         Behavior: Behavior normal.         Thought Content:  Thought content normal.         Judgment: Judgment normal.         Lab Review   Orders Only on 06/04/2021   Component Date Value    Vit D, 25-Hydroxy 06/04/2021 40.7     Cholesterol, Total 06/04/2021 182     Triglycerides 06/04/2021 111     HDL 06/04/2021 72     LDL Calculated 06/04/2021 88     Hemoglobin A1C 06/04/2021 4.6     Sodium 06/04/2021 139     Potassium 06/04/2021 3.9     Chloride 06/04/2021 104     CO2 06/04/2021 25     Anion Gap 06/04/2021 10     Glucose 06/04/2021 91     BUN 06/04/2021 15     CREATININE 06/04/2021 0.9     GFR Non- 06/04/2021 >60     GFR  06/04/2021 >59     Calcium 06/04/2021 9.4     Total Protein 06/04/2021 6.9     Albumin 06/04/2021 4.3     Total Bilirubin 06/04/2021 0.9     Alkaline Phosphatase 06/04/2021 111*    ALT 06/04/2021 18     AST 06/04/2021 22            ASSESSMENT/PLAN:    Essential hypertension-   Readings reviewed  Renal fx nl  rx metoprolol er 50 aily     Pure hypercholesterolemia- LDL improved at 88 74 in 12/2020 ( 104 in 11/2019,  I have personally reviewed and interpreted these lab results and thoroughly discussed with patient  rx crestor 10 mg daily  Healthy, mostly fiber rich nonstarchy plant-based diet recommended  Recommend to decrease intake of processed foods, simple carbohydrates and animal-based products that high in saturated fats       Vitamin D deficiency-  vitamin D level 40 (42 (33 (42)(29)   I have personally reviewed and interpreted these lab results and thoroughly discussed with patient  Restart vitamin D 50,000 IU weekly        Generalized anxiety disorder-she lost her mother in July 2020, 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 There is also no evidence of aberrant behavior like lost prescriptions or early refill requests or multiple prescribers for controlled substances.     Patient  was advised NOT to attempt to drive a motor vehichle or operate any heavy machinery within 6 hrs of taking the presribed medication - hydrocodone  Josef Rodriguez 6/2021  UDS 9/23/20       Orders Placed This Encounter   Procedures    Comprehensive Metabolic Panel    CBC Auto Differential    Hemoglobin A1C    Lipid Panel    Urinalysis    TSH without Reflex    Vitamin D 25 Hydroxy     New Prescriptions    No medications on file         Return in about 6 months (around 12/7/2021) for Annual Physical.   There are no Patient Instructions on file for this visit. EMR Dragon/transcription disclaimer:Significant part of this  encounter note is electronic transcription/translationof spoken language to printed text. The electronic translation of spoken language may be erroneous, or at times, nonsensical words or phrases may be inadvertently transcribed.  Although I have reviewed the note for sucherrors, some may still exist.

## 2021-06-08 DIAGNOSIS — M25.561 CHRONIC PAIN OF BOTH KNEES: ICD-10-CM

## 2021-06-08 DIAGNOSIS — M25.562 CHRONIC PAIN OF BOTH KNEES: ICD-10-CM

## 2021-06-08 DIAGNOSIS — M81.6 LOCALIZED OSTEOPOROSIS WITHOUT CURRENT PATHOLOGICAL FRACTURE: ICD-10-CM

## 2021-06-08 DIAGNOSIS — G89.29 CHRONIC PAIN OF BOTH KNEES: ICD-10-CM

## 2021-06-08 RX ORDER — HYDROCODONE BITARTRATE AND ACETAMINOPHEN 7.5; 325 MG/1; MG/1
1 TABLET ORAL DAILY PRN
Qty: 30 TABLET | Refills: 0 | Status: SHIPPED | OUTPATIENT
Start: 2021-06-08 | End: 2021-09-23 | Stop reason: SDUPTHER

## 2021-06-08 NOTE — TELEPHONE ENCOUNTER
Ade Cunningham called requesting a refill of the below medication which has been pended for you:     Requested Prescriptions     Pending Prescriptions Disp Refills    HYDROcodone-acetaminophen (NORCO) 7.5-325 MG per tablet 30 tablet 0     Sig: Take 1 tablet by mouth daily as needed for Pain for up to 30 days.     denosumab (PROLIA) 60 MG/ML SOSY SC injection 1 mL 1     Sig: Inject 1 mL into the skin every 6 months       Last Appointment Date: 6/7/2021  Next Appointment Date: Visit date not found    Allergies   Allergen Reactions    Morphine And Related

## 2021-07-06 RX ORDER — METOPROLOL SUCCINATE 50 MG/1
TABLET, EXTENDED RELEASE ORAL
Qty: 90 TABLET | Refills: 1 | Status: SHIPPED | OUTPATIENT
Start: 2021-07-06 | End: 2022-01-04

## 2021-07-06 RX ORDER — FLUOXETINE HYDROCHLORIDE 40 MG/1
40 CAPSULE ORAL DAILY
Qty: 90 CAPSULE | Refills: 1 | Status: SHIPPED | OUTPATIENT
Start: 2021-07-06 | End: 2022-01-04

## 2021-07-06 RX ORDER — PANTOPRAZOLE SODIUM 40 MG/1
TABLET, DELAYED RELEASE ORAL
Qty: 90 TABLET | Refills: 1 | Status: SHIPPED | OUTPATIENT
Start: 2021-07-06 | End: 2022-01-04

## 2021-07-06 NOTE — TELEPHONE ENCOUNTER
Jeanette Glover called requesting a refill of the below medication which has been pended for you:     Requested Prescriptions     Pending Prescriptions Disp Refills    pantoprazole (PROTONIX) 40 MG tablet [Pharmacy Med Name: PANTOPRAZOLE 40MG TABLETS] 90 tablet 1     Sig: TAKE 1 TABLET BY MOUTH EVERY DAY    FLUoxetine (PROZAC) 40 MG capsule [Pharmacy Med Name: FLUOXETINE 40MG CAPSULES] 90 capsule 1     Sig: TAKE 1 CAPSULE BY MOUTH DAILY    metoprolol succinate (TOPROL XL) 50 MG extended release tablet [Pharmacy Med Name: METOPROLOL ER SUCCINATE 50MG TABS] 90 tablet 1     Sig: TAKE 1 TABLET BY MOUTH DAILY       Last Appointment Date: 6/7/2021  Next Appointment Date: 12/7/2021    Allergies   Allergen Reactions    Morphine And Related

## 2021-08-27 RX ORDER — ERGOCALCIFEROL 1.25 MG/1
CAPSULE ORAL
Qty: 4 CAPSULE | Refills: 2 | Status: SHIPPED | OUTPATIENT
Start: 2021-08-27 | End: 2021-12-07 | Stop reason: SDUPTHER

## 2021-09-08 RX ORDER — ROSUVASTATIN CALCIUM 10 MG/1
TABLET, COATED ORAL
Qty: 90 TABLET | Refills: 0 | Status: SHIPPED | OUTPATIENT
Start: 2021-09-08 | End: 2022-03-28

## 2021-09-08 NOTE — TELEPHONE ENCOUNTER
Emery Loving called requesting a refill of the below medication which has been pended for you:     Requested Prescriptions     Pending Prescriptions Disp Refills    rosuvastatin (CRESTOR) 10 MG tablet [Pharmacy Med Name: ROSUVASTATIN 10MG TABLETS] 90 tablet 0     Sig: TAKE 1 TABLET BY MOUTH EVERY NIGHT AT BEDTIME       Last Appointment Date: 6/7/2021  Next Appointment Date: 12/7/2021    Allergies   Allergen Reactions    Morphine And Related

## 2021-09-19 PROCEDURE — U0004 COV-19 TEST NON-CDC HGH THRU: HCPCS | Performed by: NURSE PRACTITIONER

## 2021-09-23 DIAGNOSIS — G89.29 CHRONIC PAIN OF BOTH KNEES: ICD-10-CM

## 2021-09-23 DIAGNOSIS — M25.562 CHRONIC PAIN OF BOTH KNEES: ICD-10-CM

## 2021-09-23 DIAGNOSIS — M25.561 CHRONIC PAIN OF BOTH KNEES: ICD-10-CM

## 2021-09-23 RX ORDER — HYDROCODONE BITARTRATE AND ACETAMINOPHEN 7.5; 325 MG/1; MG/1
1 TABLET ORAL DAILY PRN
Qty: 30 TABLET | Refills: 0 | Status: SHIPPED | OUTPATIENT
Start: 2021-09-23 | End: 2021-12-07 | Stop reason: SDUPTHER

## 2021-09-23 NOTE — TELEPHONE ENCOUNTER
Aaron Vinson called to request a refill on her medication. Last office visit : 6/7/2021   Next office visit : 12/7/2021     Last UDS:   Amphetamine Screen, Urine   Date Value Ref Range Status   12/04/2020 Negative Negative <1000 ng/mL Final     Barbiturate Screen, Urine   Date Value Ref Range Status   12/06/2018 neg  Final     Benzodiazepine Screen, Urine   Date Value Ref Range Status   12/04/2020 Negative Negative <100 ng/mL Final     Buprenorphine Urine   Date Value Ref Range Status   12/06/2018 neg  Final     Cocaine Metabolite Screen, Urine   Date Value Ref Range Status   12/04/2020 Negative Negative <300 ng/mL Final     Gabapentin Screen, Urine   Date Value Ref Range Status   12/06/2018 neg  Final     Methamphetamine, Urine   Date Value Ref Range Status   12/06/2018 neg  Final     Opiate Scrn, Ur   Date Value Ref Range Status   12/04/2020 Negative Negative < 300 ng/mL Final     Oxycodone Screen, Ur   Date Value Ref Range Status   12/06/2018 neg  Final     PCP Screen, Urine   Date Value Ref Range Status   12/06/2018 neg  Final     Propoxyphene Screen, Urine   Date Value Ref Range Status   12/06/2018 neg  Final     THC Screen, Urine   Date Value Ref Range Status   12/06/2018 neg  Final     Tricyclic Antidepressants, Urine   Date Value Ref Range Status   12/06/2018 neg  Final       Last Carmen Sky: 06/07/2021  Medication Contract: 09/23/2020    Requested Prescriptions     Pending Prescriptions Disp Refills    HYDROcodone-acetaminophen (NORCO) 7.5-325 MG per tablet 30 tablet 0     Sig: Take 1 tablet by mouth daily as needed for Pain for up to 30 days. Please approve or refuse this medication.    Darylene Speedy
[Negative] : Genitourinary

## 2021-12-03 DIAGNOSIS — F51.01 PRIMARY INSOMNIA: ICD-10-CM

## 2021-12-03 DIAGNOSIS — E78.00 PURE HYPERCHOLESTEROLEMIA: ICD-10-CM

## 2021-12-03 DIAGNOSIS — E55.9 VITAMIN D DEFICIENCY: ICD-10-CM

## 2021-12-03 DIAGNOSIS — G89.29 CHRONIC PAIN OF BOTH KNEES: ICD-10-CM

## 2021-12-03 DIAGNOSIS — M25.562 CHRONIC PAIN OF BOTH KNEES: ICD-10-CM

## 2021-12-03 DIAGNOSIS — I10 ESSENTIAL HYPERTENSION: ICD-10-CM

## 2021-12-03 DIAGNOSIS — F41.1 GENERALIZED ANXIETY DISORDER: ICD-10-CM

## 2021-12-03 DIAGNOSIS — M25.561 CHRONIC PAIN OF BOTH KNEES: ICD-10-CM

## 2021-12-03 LAB
ALBUMIN SERPL-MCNC: 4.3 G/DL (ref 3.5–5.2)
ALP BLD-CCNC: 114 U/L (ref 35–104)
ALT SERPL-CCNC: 15 U/L (ref 5–33)
ANION GAP SERPL CALCULATED.3IONS-SCNC: 13 MMOL/L (ref 7–19)
AST SERPL-CCNC: 15 U/L (ref 5–32)
BASOPHILS ABSOLUTE: 0.1 K/UL (ref 0–0.2)
BASOPHILS RELATIVE PERCENT: 0.8 % (ref 0–1)
BILIRUB SERPL-MCNC: 0.8 MG/DL (ref 0.2–1.2)
BILIRUBIN URINE: NEGATIVE
BLOOD, URINE: NEGATIVE
BUN BLDV-MCNC: 20 MG/DL (ref 8–23)
CALCIUM SERPL-MCNC: 9.2 MG/DL (ref 8.8–10.2)
CHLORIDE BLD-SCNC: 104 MMOL/L (ref 98–111)
CHOLESTEROL, TOTAL: 165 MG/DL (ref 160–199)
CLARITY: CLEAR
CO2: 23 MMOL/L (ref 22–29)
COLOR: YELLOW
CREAT SERPL-MCNC: 0.9 MG/DL (ref 0.5–0.9)
EOSINOPHILS ABSOLUTE: 0.3 K/UL (ref 0–0.6)
EOSINOPHILS RELATIVE PERCENT: 4.3 % (ref 0–5)
GFR AFRICAN AMERICAN: >59
GFR NON-AFRICAN AMERICAN: >60
GLUCOSE BLD-MCNC: 99 MG/DL (ref 74–109)
GLUCOSE URINE: NEGATIVE MG/DL
HBA1C MFR BLD: 4.8 % (ref 4–6)
HCT VFR BLD CALC: 42.5 % (ref 37–47)
HDLC SERPL-MCNC: 60 MG/DL (ref 65–121)
HEMOGLOBIN: 13.9 G/DL (ref 12–16)
IMMATURE GRANULOCYTES #: 0 K/UL
KETONES, URINE: NEGATIVE MG/DL
LDL CHOLESTEROL CALCULATED: 88 MG/DL
LEUKOCYTE ESTERASE, URINE: NEGATIVE
LYMPHOCYTES ABSOLUTE: 1.5 K/UL (ref 1.1–4.5)
LYMPHOCYTES RELATIVE PERCENT: 20 % (ref 20–40)
MCH RBC QN AUTO: 28.4 PG (ref 27–31)
MCHC RBC AUTO-ENTMCNC: 32.7 G/DL (ref 33–37)
MCV RBC AUTO: 86.9 FL (ref 81–99)
MONOCYTES ABSOLUTE: 0.5 K/UL (ref 0–0.9)
MONOCYTES RELATIVE PERCENT: 6.2 % (ref 0–10)
NEUTROPHILS ABSOLUTE: 5.1 K/UL (ref 1.5–7.5)
NEUTROPHILS RELATIVE PERCENT: 68.2 % (ref 50–65)
NITRITE, URINE: NEGATIVE
PDW BLD-RTO: 14.7 % (ref 11.5–14.5)
PH UA: 5.5 (ref 5–8)
PLATELET # BLD: 256 K/UL (ref 130–400)
PMV BLD AUTO: 8.9 FL (ref 9.4–12.3)
POTASSIUM SERPL-SCNC: 4.1 MMOL/L (ref 3.5–5)
PROTEIN UA: NEGATIVE MG/DL
RBC # BLD: 4.89 M/UL (ref 4.2–5.4)
SODIUM BLD-SCNC: 140 MMOL/L (ref 136–145)
SPECIFIC GRAVITY UA: 1.02 (ref 1–1.03)
TOTAL PROTEIN: 7 G/DL (ref 6.6–8.7)
TRIGL SERPL-MCNC: 85 MG/DL (ref 0–149)
TSH SERPL DL<=0.05 MIU/L-ACNC: 1.71 UIU/ML (ref 0.27–4.2)
UROBILINOGEN, URINE: 1 E.U./DL
VITAMIN D 25-HYDROXY: 30 NG/ML
WBC # BLD: 7.4 K/UL (ref 4.8–10.8)

## 2021-12-07 ENCOUNTER — OFFICE VISIT (OUTPATIENT)
Dept: INTERNAL MEDICINE | Age: 61
End: 2021-12-07
Payer: COMMERCIAL

## 2021-12-07 VITALS
HEIGHT: 65 IN | SYSTOLIC BLOOD PRESSURE: 122 MMHG | HEART RATE: 76 BPM | OXYGEN SATURATION: 97 % | RESPIRATION RATE: 18 BRPM | DIASTOLIC BLOOD PRESSURE: 88 MMHG | BODY MASS INDEX: 30.66 KG/M2 | WEIGHT: 184 LBS

## 2021-12-07 DIAGNOSIS — M81.6 LOCALIZED OSTEOPOROSIS WITHOUT CURRENT PATHOLOGICAL FRACTURE: ICD-10-CM

## 2021-12-07 DIAGNOSIS — Z00.00 ANNUAL PHYSICAL EXAM: Primary | ICD-10-CM

## 2021-12-07 DIAGNOSIS — M25.561 CHRONIC PAIN OF BOTH KNEES: ICD-10-CM

## 2021-12-07 DIAGNOSIS — I10 ESSENTIAL HYPERTENSION: ICD-10-CM

## 2021-12-07 DIAGNOSIS — F51.01 PRIMARY INSOMNIA: ICD-10-CM

## 2021-12-07 DIAGNOSIS — M25.562 CHRONIC PAIN OF BOTH KNEES: ICD-10-CM

## 2021-12-07 DIAGNOSIS — Z80.0 FAMILY HX OF COLON CANCER: ICD-10-CM

## 2021-12-07 DIAGNOSIS — E55.9 VITAMIN D DEFICIENCY: ICD-10-CM

## 2021-12-07 DIAGNOSIS — F41.1 GENERALIZED ANXIETY DISORDER: ICD-10-CM

## 2021-12-07 DIAGNOSIS — Z12.11 SCREENING FOR COLORECTAL CANCER: ICD-10-CM

## 2021-12-07 DIAGNOSIS — Z12.31 ENCOUNTER FOR SCREENING MAMMOGRAM FOR BREAST CANCER: ICD-10-CM

## 2021-12-07 DIAGNOSIS — Z12.12 SCREENING FOR COLORECTAL CANCER: ICD-10-CM

## 2021-12-07 DIAGNOSIS — G89.29 CHRONIC PAIN OF BOTH KNEES: ICD-10-CM

## 2021-12-07 DIAGNOSIS — E78.00 PURE HYPERCHOLESTEROLEMIA: ICD-10-CM

## 2021-12-07 PROCEDURE — 80305 DRUG TEST PRSMV DIR OPT OBS: CPT | Performed by: INTERNAL MEDICINE

## 2021-12-07 PROCEDURE — 99396 PREV VISIT EST AGE 40-64: CPT | Performed by: INTERNAL MEDICINE

## 2021-12-07 RX ORDER — HYDROCODONE BITARTRATE AND ACETAMINOPHEN 7.5; 325 MG/1; MG/1
1 TABLET ORAL DAILY PRN
Qty: 30 TABLET | Refills: 0 | Status: SHIPPED | OUTPATIENT
Start: 2021-12-07 | End: 2022-03-10 | Stop reason: SDUPTHER

## 2021-12-07 RX ORDER — ERGOCALCIFEROL 1.25 MG/1
CAPSULE ORAL
Qty: 12 CAPSULE | Refills: 1 | Status: SHIPPED | OUTPATIENT
Start: 2021-12-07 | End: 2022-05-27

## 2021-12-07 ASSESSMENT — ENCOUNTER SYMPTOMS
CHEST TIGHTNESS: 0
COUGH: 0
ABDOMINAL PAIN: 0
SORE THROAT: 0
CONSTIPATION: 0
WHEEZING: 0

## 2021-12-07 NOTE — LETTER
CONTROLLED SUBSTANCE MEDICATION AGREEMENT     Patient Name: Hortensia Born  Patient YOB: 1960   I understand, that controlled substance medications may be used to help better manage my symptoms and to improve my ability to function at home, work and in social settings. However, I also understand that these medications do have risks, which have been discussed with me, including possible development of physical or psychological dependence. I understand that successful treatment requires mutual trust and honesty between me and my provider. I understand and agree that following this Medication Agreement is necessary in continuing my provider-patient relationship and the success of my treatment plan. Explanation from my Provider: Benefits and Goals of Controlled Substance Medications: There are two potential goals for your treatment: (1) decreased pain and suffering (2) improved daily life functions. There are many possible treatments for your chronic condition(s). Alternatives such as physical therapy, yoga, massage, home daily exercise, meditation, relaxation techniques, injections, chiropractic manipulations, surgery, cognitive therapy, hypnosis and many medications that are not habit-forming may be used. Use of controlled substance medications may be helpful, but they are unlikely to resolve all symptoms or restore all function. Explanation from my Provider: Risks of Controlled Substance Medications:  Opioid pain medications: These medications can lead to problems such as addiction/dependence, sedation, lightheadedness/dizziness, memory issues, falls, constipation, nausea, or vomiting. They may also impair the ability to drive or operate machinery. Additionally, these medications may lower testosterone levels, leading to loss of bone strength, stamina and sex drive.   They may cause problems with breathing, sleep apnea and reduced coughing, which is especially dangerous for patients with lung disease. Overdose or dangerous interactions with alcohol and other medications may occur, leading to death. Hyperalgesia may develop, which means patients receiving opioids for the treatment of pain may become more sensitive to certain painful stimuli, and in some cases, experience pain from ordinarily non-painful stimuli. Women between the ages of 14-53 who could become pregnant should carefully weigh the risks and benefits of opioids with their physicians, as these medications increase the risk of pregnancy complications, including miscarriage,  delivery and stillbirth. It is also possible for babies to be born addicted to opioids. Opioid dependence withdrawal symptoms may include; feelings of uneasiness, increased pain, irritability, belly pain, diarrhea, sweats and goose-flesh. Benzodiazepines and non-benzodiazepine sleep medications: These medications can lead to problems such as addiction/dependence, sedation, fatigue, lightheadedness, dizziness, incoordination, falls, depression, hallucinations, and impaired judgment, memory and concentration. The ability to drive and operate machinery may also be affected. Abnormal sleep-related behaviors have been reported, including sleepwalking, driving, making telephone calls, eating, or having sex while not fully awake. These medications can suppress breathing and worsen sleep apnea, particularly when combined with alcohol or other sedating medications, potentially leading to death. Dependence withdrawal symptoms may include tremors, anxiety, hallucinations and seizures. Stimulants:  Common adverse effects include addiction/dependence, increased blood  pressure and heart rate, decreased appetite, nausea, involuntary weight loss, insomnia,                                                                                                                     Initials:_______   irritability, and headaches.   These risks may increase when these medications are combined with other stimulants, such as caffeine pills or energy drinks, certain weight loss supplements and oral decongestants. Dependence withdrawal symptoms may include depressed mood, loss of interest, suicidal thoughts, anxiety, fatigue, appetite changes and agitation. Testosterone replacement therapy:  Potential side effects include increased risk of stroke and heart attack, blood clots, increased blood pressure, increased cholesterol, enlarged prostate, sleep apnea, irritability/aggression and other mood disorders, and decreased fertility. I agree and understand that I and my prescriber have the following rights and responsibilities regarding my treatment plan:     1. MY RIGHTS:  To be informed of my treatment and medication plan. To be an active participant in my health and wellbeing. 2. MY RESPONSIBILITY AND UNDERSTANDING FOR USE OF MEDICATIONS   I will take medications at the dose and frequency as directed. For my safety, I will not increase or change how I take my medications without the recommendation of my healthcare provider.  I will actively participate in any program recommended by my provider which may improve function, including social, physical, psychological programs.  I will not take my medications with alcohol or other drugs not prescribed to me. I understand that drinking alcohol with my medications increases the chances of side effects, including reduced breathing rate and could lead to personal injury when operating machinery.  I understand that if I have a history of substance use disorders, including alcohol or other illicit drugs, that I may be at increased risk of addiction to my medications.  I agree to notify my provider immediately if I should become pregnant so that my treatment plan can be adjusted.    I agree and understand that I shall only receive controlled substance medications from the prescriber that signed this agreement unless there is written agreement among other prescribers of controlled substances outlining the responsibility of the medications being prescribed.  I understand that the if the controlled medication is not helping to achieve goals, the dosage may be tapered and no longer prescribed. 3. MY RESPONSIBILITY FOR COMMUNICATION / PRESCRIPTION RENEWALS   I agree that all controlled substance medications that I take will be prescribed only by my provider. If another healthcare provider prescribes me medication in an emergency, I will notify my provider within seventy-two (72) hours.  I will arrange for refills at the prescribed interval ONLY during regular office hours. I will not ask for refills earlier than agreed, after-hours, on holidays or weekends. Refills may take up to 72 hours for processing and prescriptions to reach the pharmacy.  I will inform my other health care providers that I am taking these medications and of the existence of this [id number]. In the event of an emergency, I will provide the same information to the emergency department prescribers.  I will keep my provider updated on the pharmacy I am using for controlled medication prescription filling. Initials:_______  4. MY RESPONSIBILITY FOR PROTECTING MEDICATIONS   I will protect my prescriptions and medications. I understand that lost or misplaced prescriptions will not be replaced.  I will keep medications only for my own use and will not share them with others. I will keep all medications away from children.  I agree that if my medications are adjusted or discontinued, I will properly dispose of any remaining medications. I understand that I will be required to dispose of any remaining controlled medications as, directed by my prescriber, prior to being provided with any prescriptions for other controlled medications.   Medication drop box locations can be found at: [employer]    5. MY RESPONSIBILITY WITH ILLEGAL DRUGS    I will not use illegal or street drugs or another person's prescription medications not prescribed to me.  If there are identified addiction type symptoms, then referral to a program may be provided by my provider and I agree to follow through with this recommendation. 6. MY RESPONSIBILITY FOR COOPERATION WITH INVESTIGATIONS   I understand that my provider will comply with any applicable law and may discuss my use and/or possible misuse/abuse of controlled substances and alcohol, as appropriate, with any health care provider involved in my care, pharmacist, or legal authority.  I authorize my provider and pharmacy to cooperate fully with law enforcement agencies (as permitted by law) in the investigation of any possible misuse, sale, or other diversion of my controlled substances.  I agree to waive any applicable privilege or right of privacy or confidentiality with respect to these authorizations. 7. PROVIDERS RIGHT TO MONITOR FOR SAFETY: PRESCRIPTION MONITORING / DRUG TESTING   I consent to drug/toxicology screening and will submit to a drug screen upon my providers request to assure I am only taking the prescribed drugs for my safety monitoring. I understand that a drug screen is a laboratory test in which a sample of my urine, blood or saliva is checked to see what drugs I have been taking. This may entail an observed urine specimen, which means that a nurse or other health care provider may watch me provide urine, and I will cooperate if I am asked to provide an observed specimen.  I understand that my provider will check a copy of my State Prescription Monitoring Program () Report in order to safely prescribe medications.  Pill Counts: I consent to pill counts when requested.   I may be asked to bring all my prescribed controlled substance medications, in their original bottles, to all of my scheduled appointments. In addition, my provider may ask me to come to the practice at any time for a random pill count. 8. TERMINATION OF THIS AGREEMENT  For my safety, my prescriber has the right to stop prescribing controlled substance medications and may end this agreement. Initials:_______   Conditions that may result in termination of this agreement:  a. I do not show any improvement in pain, or my activity has not improved. b. I develop rapid tolerance or loss of improvement, as described in my treatment plan.  c. I develop significant side effects from the medication. d. My behavior is not consistent with the responsibilities outlined above, thereby causing safety concerns to continue prescribing controlled substance medications. e. I fail to follow the terms of this agreement. f. Other:____________________________       UNDERSTANDING THIS MEDICATION AGREEMENT:    I have read the above and have had all my questions answered. For chronic disease management, I know that my symptoms can be managed with many types of treatments. A chronic medication trial may be part of my treatment, but I must be an active participant in my care. Medication therapy is only one part of my symptom management plan. In some cases, there may be limited scientific evidence to support the chronic use of certain medications to improve symptoms and daily function. Furthermore, in certain circumstances, there may be scientific information that suggests that the use of chronic controlled substances may worsen my symptoms and increase my risk of unintentional death directly related to this medication therapy. I know that if my provider feels my risk from controlled medications is greater than my benefit, I will have my controlled substance medication(s) compassionately lowered or removed altogether.      I further agree to allow this office to contact my HIPAA contact if there are concerns about my safety and use of the controlled medications. I have agreed to use the prescribed controlled substance medications to me as instructed by my provider and as stated in this Medication Agreement. My initial on each page and my signature below shows that I have read each page and I have had the opportunity to ask questions with answers provided by my provider.     Patient Name (Printed): _____________________________________  Patient Signature:  ______________________   Date: _____________    Prescriber Name (Printed): ___________________________________  Prescriber Signature: _____________________  Date: _____________

## 2021-12-07 NOTE — PROGRESS NOTES
Chief Complaint:   Denisse Carranza is a 57 y.o. female who presents forcomplete physical exam.    History of Present Illness:      Denisse Carranza is a 57 y.o. female who presents todayfor wellness visit AND follow up on her chronic medical conditions as noted below. Patient Active Problem List    Diagnosis Date Noted    Chronic pain of both knees 08/01/2018    Facial skin lesion 08/01/2018    Right temporal headache 09/21/2017    Vision changes 09/21/2017    Pure hypercholesterolemia 09/10/2017    Vitamin D deficiency 09/10/2017    Essential hypertension 09/10/2017    Localized osteoporosis without current pathological fracture 09/10/2017     2/14 Lspine -2.1/ hi neck -2.6; 1/2017 Lspine -1.8; hip neck -2.6  3/2021 hip neck -2.8; L spine -1.8      Rheumatoid arthritis involving both hands with positive rheumatoid factor ([facility] [address] 09/10/2017     Dx 2011 RA/ CCP ne      Primary insomnia 09/10/2017    Generalized anxiety disorder 09/10/2017    Reactive depression 09/10/2017       Past Medical History:   Diagnosis Date    Depression     GERD (gastroesophageal reflux disease)     Headache     Hyperlipidemia     Hypertension     Insomnia     Vitamin D deficiency        Past Surgical History:   Procedure Laterality Date    ADENOIDECTOMY      BREAST BIOPSY      two    COLONOSCOPY      OVARY REMOVAL      right    TONSILLECTOMY         Current Outpatient Medications   Medication Sig Dispense Refill    vitamin D (ERGOCALCIFEROL) 1.25 MG (03354 UT) CAPS capsule Take 1 capsule by mouth once weekly 12 capsule 1    HYDROcodone-acetaminophen (NORCO) 7.5-325 MG per tablet Take 1 tablet by mouth daily as needed for Pain for up to 30 days.  30 tablet 0    denosumab (PROLIA) 60 MG/ML SOSY SC injection Inject 1 mL into the skin every 6 months 1 mL 1    rosuvastatin (CRESTOR) 10 MG tablet TAKE 1 TABLET BY MOUTH EVERY NIGHT AT BEDTIME 90 tablet 0    pantoprazole (PROTONIX) 40 MG tablet TAKE 1 TABLET BY MOUTH EVERY DAY 90 tablet 1    FLUoxetine (PROZAC) 40 MG capsule TAKE 1 CAPSULE BY MOUTH DAILY 90 capsule 1    metoprolol succinate (TOPROL XL) 50 MG extended release tablet TAKE 1 TABLET BY MOUTH DAILY 90 tablet 1    fluticasone (FLONASE) 50 MCG/ACT nasal spray SHAKE LIQUID AND USE 2 SPRAYS IN EACH NOSTRIL DAILY 16 g 2    busPIRone (BUSPAR) 15 MG tablet TAKE 1 TABLET BY MOUTH TWICE DAILY AS NEEDED FOR ANXIETY 60 tablet 2    Cholecalciferol (VITAMIN D3) 2000 units CAPS Take by mouth      aspirin 81 MG chewable tablet Take 81 mg by mouth daily       No current facility-administered medications for this visit. Allergies   Allergen Reactions    Morphine And Related        Social History     Socioeconomic History    Marital status:      Spouse name: None    Number of children: 1    Years of education: None    Highest education level: None   Occupational History    Occupation: [employer]    Tobacco Use    Smoking status: Never Smoker    Smokeless tobacco: Never Used   Substance and Sexual Activity    Alcohol use: Yes    Drug use: No    Sexual activity: Never   Other Topics Concern    None   Social History Narrative    None     Social Determinants of Health     Financial Resource Strain: Low Risk     Difficulty of Paying Living Expenses: Not hard at all   Food Insecurity: No Food Insecurity    Worried About Running Out of Food in the Last Year: Never true    [address] in the Last Year: Never true   Transportation Needs:     Lack of Transportation (Medical): Not on file    Lack of Transportation (Non-Medical):  Not on file   Physical Activity:     Days of Exercise per Week: Not on file    Minutes of Exercise per Session: Not on file   Stress:     Feeling of Stress : Not on file   Social Connections:     Frequency of Communication with Friends and Family: Not on file    Frequency of Social Gatherings with Friends and Family: Not on file    Attends [employer] Services: Not on file   [facility] [address] or Organizations: Not on file    Attends Club or Organization Meetings: Not on file    Marital Status: Not on file   Intimate Partner Violence:     Fear of Current or Ex-Partner: Not on file    Emotionally Abused: Not on file    Physically Abused: Not on file    Sexually Abused: Not on file   Housing Stability:     Unable to Pay for Housing in the Last Year: Not on file    Number of Places Lived in the Last Year: Not on file    Unstable Housing in the Last Year: Not on file     Family History   Problem Relation Age of Onset    Lung Cancer Father     Colon Cancer Sister 62        COLON    Other Sister         PANCREATIC CANCER    Diabetes Brother     Coronary Art Dis Brother           Past Surgical History:   Procedure Laterality Date    ADENOIDECTOMY      BREAST BIOPSY      two    COLONOSCOPY      OVARY REMOVAL      right    TONSILLECTOMY           Lab Review   Orders Only on 12/03/2021   Component Date Value    Vit D, 25-Hydroxy 12/03/2021 30.0     TSH 12/03/2021 1.710     Color, UA 12/03/2021 YELLOW     Clarity, UA 12/03/2021 Clear     Glucose, Ur 12/03/2021 Negative     Bilirubin Urine 12/03/2021 Negative     Ketones, Urine 12/03/2021 Negative     Specific Gravity, UA 12/03/2021 1.021     Blood, Urine 12/03/2021 Negative     pH, UA 12/03/2021 5.5     Protein, UA 12/03/2021 Negative     Urobilinogen, Urine 12/03/2021 1.0     Nitrite, Urine 12/03/2021 Negative     Leukocyte Esterase, Urine 12/03/2021 Negative     Cholesterol, Total 12/03/2021 165     Triglycerides 12/03/2021 85     HDL 12/03/2021 60*    LDL Calculated 12/03/2021 88     Hemoglobin A1C 12/03/2021 4.8     WBC 12/03/2021 7.4     RBC 12/03/2021 4.89     Hemoglobin 12/03/2021 13.9     Hematocrit 12/03/2021 42.5     MCV 12/03/2021 86.9     MCH 12/03/2021 28.4     MCHC 12/03/2021 32.7*    RDW 12/03/2021 14.7*    Platelets 23/95/3326 256     MPV 12/03/2021 8.9*    Neutrophils % 12/03/2021 68.2*    Lymphocytes % 12/03/2021 20.0     Monocytes % 12/03/2021 6.2     Eosinophils % 12/03/2021 4.3     Basophils % 12/03/2021 0.8     Neutrophils Absolute 12/03/2021 5.1     Immature Granulocytes # 12/03/2021 0.0     Lymphocytes Absolute 12/03/2021 1.5     Monocytes Absolute 12/03/2021 0.50     Eosinophils Absolute 12/03/2021 0.30     Basophils Absolute 12/03/2021 0.10     Sodium 12/03/2021 140     Potassium 12/03/2021 4.1     Chloride 12/03/2021 104     CO2 12/03/2021 23     Anion Gap 12/03/2021 13     Glucose 12/03/2021 99     BUN 12/03/2021 20     CREATININE 12/03/2021 0.9     GFR Non- 12/03/2021 >60     GFR  12/03/2021 >59     Calcium 12/03/2021 9.2     Total Protein 12/03/2021 7.0     Albumin 12/03/2021 4.3     Total Bilirubin 12/03/2021 0.8     Alkaline Phosphatase 12/03/2021 114*    ALT 12/03/2021 15     AST 12/03/2021 15          Review of Systems   Constitutional: Negative for chills, fatigue and fever. HENT: Negative for congestion, ear pain, nosebleeds, postnasal drip and sore throat. Respiratory: Negative for cough, chest tightness and wheezing. Cardiovascular: Negative for chest pain, palpitations and leg swelling. Gastrointestinal: Negative for abdominal pain and constipation. Genitourinary: Negative for dysuria and urgency. Musculoskeletal: Negative. Negative for arthralgias. Skin: Negative for rash. Neurological: Negative for dizziness and headaches. Psychiatric/Behavioral: Negative. Vitals:    12/07/21 1047   BP: 122/88   Site: Left Upper Arm   Position: Sitting   Cuff Size: Large Adult   Pulse: 76   Resp: 18   SpO2: 97%   Weight: 184 lb (83.5 kg)   Height: 5' 5\" (1.651 m)      Wt Readings from Last 3 Encounters:   12/07/21 184 lb (83.5 kg)   06/07/21 185 lb (83.9 kg)   12/07/20 180 lb (81.6 kg)   Body mass index is 30.62 kg/m².   BP Readings from Last 3 Encounters:   12/07/21 122/88 06/07/21 132/86   12/07/20 118/80       Physical Exam  Constitutional:       Appearance: She is well-developed. HENT:      Right Ear: External ear normal.      Left Ear: External ear normal.      Mouth/Throat:      Pharynx: No oropharyngeal exudate. Eyes:      Conjunctiva/sclera: Conjunctivae normal.      Pupils: Pupils are equal, round, and reactive to light. Neck:      Thyroid: No thyromegaly. Vascular: No JVD. Cardiovascular:      Rate and Rhythm: Normal rate. Heart sounds: Normal heart sounds. No murmur heard. Pulmonary:      Effort: No respiratory distress. Breath sounds: Normal breath sounds. No wheezing or rales. Chest:      Chest wall: No tenderness. Abdominal:      General: Bowel sounds are normal.      Palpations: Abdomen is soft. Musculoskeletal:      Cervical back: Neck supple. Lymphadenopathy:      Cervical: No cervical adenopathy. Skin:     Findings: No rash. Neurological:      Mental Status: She is oriented to person, place, and time.      Breast exam  Bilateral breast exam- symmetric, no nodules, no lymphadenopathy, no nipple discharge              ASSESSMENT/PLAN    ANNUAL PHYSICAL  * cscope 2015 repeat 5 yr ( family hx)-past due  [facility]  Open access orders placed  * mammogram 3/2021  * PAP 2020  * bone density 3/2021    Essential hypertension-   Readings reviewed  Renal fx nl  rx metoprolol er 50 aily     Pure hypercholesterolemia-   LDL improved SP 27 in 12/2021  I have personally reviewed and interpreted these lab results and thoroughly discussed with patient  rx crestor 10 mg daily  Healthy, mostly fiber rich nonstarchy plant-based diet recommended  Recommend to decrease intake of processed foods, simple carbohydrates and animal-based products that high in saturated fats        Vitamin D deficiency-    vitamin D level low at 30   I have personally reviewed and interpreted these lab results and thoroughly discussed with patient  Restart vitamin D 50,000 IU weekly        Generalized anxiety disorder-she lost her mother in July 2020, 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  She is adjusting to new normal  rx fluoxetine daily        Osteoporosis  Localized osteoporosis without current pathological fracture 09/10/2017    2/14 Lspine -2.1/ hi neck -2.6; 1/2017 Lspine -1.8; hip neck -2.6  3/2021 hip neck -2.8; L spine -1.8       Did not take prolia ( was worried about taking this together with covid injection)        RA/ knee pains  Giving her prescription for hydrocodone for when necessary occasional use only, she usually only fills #30 every 6 months  Sagastume Miki was reviewed  On exam today and as per discussions with the patient today there is no evidence of adverse events such as cognitive impairment, sedation, constipation or falls related to prescribed medications. There is also no evidence of aberrant behavior like lost prescriptions or early refill requests or multiple prescribers for controlled substances.     Patient  was advised NOT to attempt to drive a motor vehichle or operate any heavy machinery within 6 hrs of taking the presribed medication - hydrocodone  Praveen 12/2021  Orders Placed This Encounter   Procedures    ANGUS DIGITAL SCREEN W OR WO CAD BILATERAL    Comprehensive Metabolic Panel    Lipid Panel    Vitamin D 25 Hydroxy    TSH without Reflex   [facility] [place]    POCT Rapid Drug Screen     New Prescriptions    No medications on file      There are no Patient Instructions on file for this visit. Return in about 6 months (around 6/7/2022) for Annual Physical.   EMR Dragon/transcription disclaimer:Significant part of this  encounter note is electronic transcription/translation of spoken language to printed text. The electronic translation of spoken language may beerroneous, or at times, nonsensical words or phrases may be inadvertently transcribed.  Although I have reviewed the note for such errors, some may still exist.

## 2022-01-04 RX ORDER — FLUOXETINE HYDROCHLORIDE 40 MG/1
CAPSULE ORAL
Qty: 90 CAPSULE | Refills: 1 | Status: SHIPPED | OUTPATIENT
Start: 2022-01-04

## 2022-01-04 RX ORDER — PANTOPRAZOLE SODIUM 40 MG/1
TABLET, DELAYED RELEASE ORAL
Qty: 90 TABLET | Refills: 1 | Status: SHIPPED | OUTPATIENT
Start: 2022-01-04

## 2022-01-04 RX ORDER — METOPROLOL SUCCINATE 50 MG/1
TABLET, EXTENDED RELEASE ORAL
Qty: 90 TABLET | Refills: 1 | Status: SHIPPED | OUTPATIENT
Start: 2022-01-04

## 2022-01-04 NOTE — TELEPHONE ENCOUNTER
Teodoro Prieto called requesting a refill of the below medication which has been pended for you:     Requested Prescriptions     Pending Prescriptions Disp Refills    metoprolol succinate (TOPROL XL) 50 MG extended release tablet [Pharmacy Med Name: METOPROLOL ER SUCCINATE 50MG TABS] 90 tablet 1     Sig: TAKE 1 TABLET BY MOUTH DAILY    FLUoxetine (PROZAC) 40 MG capsule [Pharmacy Med Name: FLUOXETINE 40MG CAPSULES] 90 capsule 1     Sig: TAKE ONE CAPSULE BY MOUTH DAILY    pantoprazole (PROTONIX) 40 MG tablet [Pharmacy Med Name: PANTOPRAZOLE 40MG TABLETS] 90 tablet 1     Sig: TAKE 1 TABLET BY MOUTH EVERY DAY       Last Appointment Date: 12/7/2021  Next Appointment Date: 6/9/2022    Allergies   Allergen Reactions    Morphine And Related

## 2022-01-20 DIAGNOSIS — Z11.59 SCREENING FOR VIRAL DISEASE: Primary | ICD-10-CM

## 2022-01-24 ENCOUNTER — TELEPHONE (OUTPATIENT)
Dept: GASTROENTEROLOGY | Age: 62
End: 2022-01-24

## 2022-01-25 ENCOUNTER — TELEPHONE (OUTPATIENT)
Dept: GASTROENTEROLOGY | Age: 62
End: 2022-01-25

## 2022-02-07 DIAGNOSIS — Z11.59 SCREENING FOR VIRAL DISEASE: ICD-10-CM

## 2022-02-07 LAB — SARS-COV-2, PCR: NOT DETECTED

## 2022-02-09 ENCOUNTER — ANESTHESIA EVENT (OUTPATIENT)
Dept: OPERATING ROOM | Age: 62
End: 2022-02-09

## 2022-02-09 RX ORDER — SODIUM CHLORIDE 9 MG/ML
INJECTION, SOLUTION INTRAVENOUS CONTINUOUS
Status: CANCELLED | OUTPATIENT
Start: 2022-02-09

## 2022-02-10 ENCOUNTER — HOSPITAL ENCOUNTER (OUTPATIENT)
Age: 62
Setting detail: OUTPATIENT SURGERY
Discharge: HOME | End: 2022-02-10
Attending: INTERNAL MEDICINE | Admitting: INTERNAL MEDICINE
Payer: COMMERCIAL

## 2022-02-10 ENCOUNTER — ANESTHESIA (OUTPATIENT)
Dept: OPERATING ROOM | Age: 62
End: 2022-02-10

## 2022-02-10 ENCOUNTER — HOSPITAL ENCOUNTER (OUTPATIENT)
Age: 62
Setting detail: SPECIMEN
Discharge: HOME | End: 2022-02-10
Payer: COMMERCIAL

## 2022-02-10 ENCOUNTER — APPOINTMENT (OUTPATIENT)
Dept: OPERATING ROOM | Age: 62
End: 2022-02-10

## 2022-02-10 VITALS
WEIGHT: 183 LBS | OXYGEN SATURATION: 95 % | DIASTOLIC BLOOD PRESSURE: 70 MMHG | RESPIRATION RATE: 17 BRPM | BODY MASS INDEX: 30.49 KG/M2 | HEIGHT: 65 IN | SYSTOLIC BLOOD PRESSURE: 120 MMHG | HEART RATE: 62 BPM | TEMPERATURE: 97.2 F

## 2022-02-10 VITALS — OXYGEN SATURATION: 94 % | SYSTOLIC BLOOD PRESSURE: 104 MMHG | DIASTOLIC BLOOD PRESSURE: 76 MMHG

## 2022-02-10 PROCEDURE — 45380 COLONOSCOPY AND BIOPSY: CPT

## 2022-02-10 PROCEDURE — 45380 COLONOSCOPY AND BIOPSY: CPT | Performed by: INTERNAL MEDICINE

## 2022-02-10 PROCEDURE — G8918 PT W/O PREOP ORDER IV AB PRO: HCPCS

## 2022-02-10 PROCEDURE — G8907 PT DOC NO EVENTS ON DISCHARG: HCPCS

## 2022-02-10 PROCEDURE — 88305 TISSUE EXAM BY PATHOLOGIST: CPT

## 2022-02-10 RX ORDER — SODIUM CHLORIDE 9 MG/ML
INJECTION, SOLUTION INTRAVENOUS CONTINUOUS
Status: DISCONTINUED | OUTPATIENT
Start: 2022-02-10 | End: 2022-02-10 | Stop reason: HOSPADM

## 2022-02-10 RX ORDER — LIDOCAINE HYDROCHLORIDE 10 MG/ML
INJECTION, SOLUTION INFILTRATION; PERINEURAL PRN
Status: DISCONTINUED | OUTPATIENT
Start: 2022-02-10 | End: 2022-02-10 | Stop reason: SDUPTHER

## 2022-02-10 RX ORDER — PROPOFOL 10 MG/ML
INJECTION, EMULSION INTRAVENOUS PRN
Status: DISCONTINUED | OUTPATIENT
Start: 2022-02-10 | End: 2022-02-10 | Stop reason: SDUPTHER

## 2022-02-10 RX ADMIN — SODIUM CHLORIDE: 9 INJECTION, SOLUTION INTRAVENOUS at 12:08

## 2022-02-10 RX ADMIN — PROPOFOL 250 MG: 10 INJECTION, EMULSION INTRAVENOUS at 12:20

## 2022-02-10 RX ADMIN — LIDOCAINE HYDROCHLORIDE 40 MG: 10 INJECTION, SOLUTION INFILTRATION; PERINEURAL at 12:20

## 2022-02-10 ASSESSMENT — PAIN SCALES - GENERAL
PAINLEVEL_OUTOF10: 0
PAINLEVEL_OUTOF10: 0

## 2022-02-10 ASSESSMENT — PAIN - FUNCTIONAL ASSESSMENT: PAIN_FUNCTIONAL_ASSESSMENT: 0-10

## 2022-02-10 NOTE — OP NOTE
Patient: Enriquetastanley Khan : 1960  Med Rec#: [id number] Acc#: [id number]   Primary Care Provider Micah De La Torre MD    Date of Procedure:  2/10/2022    Endoscopist: Victoria Shin MD    Referring Provider: Micah De La Torre MD    Operation Performed: Colonoscopy with biopsy    Indications: Screening- family hx of colon CA    Anesthesia:  Sedation was administered by anesthesia who monitored the patient during the procedure. I met with [facility] prior to procedure. We discussed the procedure itself, and I have discussed the risks of endoscopy (including-- but not limited to-- pain, discomfort, bleeding potentially requiring second endoscopic procedure and/or blood transfusion, organ perforation requiring operative repair, damage to organs near the colon, infection, aspiration, cardiopulmonary/allergic reaction), benefits, indications to endoscopy. Additionally, we discussed options other than colonoscopy. The patient expressed understanding. All questions answered. The patient decided to proceed with the procedure. Signed informed consent was placed on the chart. Blood Loss: minimal    Withdrawal time: > 6 min  Bowel Prep: adequate     Complications: no immediate complications    DESCRIPTION OF PROCEDURE:     A time out was performed. After written informed consent was obtained, the patient was placed in the left lateral position. The perianal area was inspected, and a digital rectal exam was performed. A rectal exam was performed: normal tone, no palpable lesions. At this point, a forward viewing Olympus colonoscope was inserted into the anus and carefully advanced to the cecum. The cecum was identified by the ileocecal valve and the appendiceal orifice. The colonoscope was then slowly withdrawn with careful inspection of the mucosa in a linear and circumferential fashion. The scope was retroflexed in the rectum.  Suction was utilized during the procedure to remove as much air as possible from the bowel. The colonoscope was removed from the patient, and the procedure was terminated. Findings are listed below. Findings: The mucosa appeared normal throughout the entire examined colon. In the ascending colon there was a questionable lipoma vs polyp- biopsied to confirm. Retroflexion in the rectum was normal and revealed no further abnormalities. Recommendations:  1. Repeat colonoscopy: pending pathology - 5 years  2. Await biopsy results-you will receive a letter with your results. Findings and recommendations were discussed w/ the patient. A copy of the images was provided. Saintclair Shook, am scribing for and in the presence of Dr. Sylvia Augustine MD.  Electronically signed by Reza Dominguez RN on 2/10/2022 at 11:55 AM    I personally performed the services described in this documentation as scribed by Terrell Moscoso, and it appears accurate and complete.      Sylvia Augustine MD  2/10/2022

## 2022-02-10 NOTE — H&P
Patient Name: Symone Roberson  : 1960  MRN: [id number]  DATE: 02/10/22    Allergies: Allergies   Allergen Reactions    Morphine And Related         ENDOSCOPY  History and Physical    Procedure:    [] Diagnostic Colonoscopy       [x] Screening Colonoscopy  [] EGD      [] ERCP      [] EUS       [] Other    [x] Previous office notes/History and Physical reviewed from the patients chart. Please see EMR for further details of HPI. I have examined the patient's status immediately prior to the procedure and:      Indications/HPI:       [x] Screening              [] History of Polyps      [x]Fhx of colon CA/polyps []+Cologard/DNA/Stool Testing      Anesthesia:   [x] MAC [] Moderate Sedation   [] General   [] None     ROS: 12 pt review of systems was negative unless stated above    Medications:   Prior to Admission medications    Medication Sig Start Date End Date Taking?  Authorizing Provider   metoprolol succinate (TOPROL XL) 50 MG extended release tablet TAKE 1 TABLET BY MOUTH DAILY 22   Deidra Alexander MD   FLUoxetine (PROZAC) 40 MG capsule TAKE ONE CAPSULE BY MOUTH DAILY 22   Deidra Alexander MD   pantoprazole (PROTONIX) 40 MG tablet TAKE 1 TABLET BY MOUTH EVERY DAY 22   Deidra Alexander MD   vitamin D (ERGOCALCIFEROL) 1.25 MG (80002 UT) CAPS capsule Take 1 capsule by mouth once weekly 21   Deidra Alexander MD   denosumab (PROLIA) 60 MG/ML SOSY SC injection Inject 1 mL into the skin every 6 months 21   Deidra Alexander MD   rosuvastatin (CRESTOR) 10 MG tablet TAKE 1 TABLET BY MOUTH EVERY NIGHT AT BEDTIME 21   Deidra Alexander MD   rosuvastatin (CRESTOR) 10 MG tablet TAKE 1 TABLET BY MOUTH EVERY NIGHT 21   Deidra Alexander MD   fluticasone (FLONASE) 50 MCG/ACT nasal spray SHAKE LIQUID AND USE 2 SPRAYS IN [facility] NOSTRIL DAILY 21   Deidra Alexander MD   busPIRone (BUSPAR) 15 MG tablet TAKE 1 TABLET BY MOUTH TWICE DAILY AS NEEDED FOR ANXIETY 20   Deidra Alexander MD   Cholecalciferol (VITAMIN D3) 2000 units CAPS Take by mouth    Historical Provider, MD   aspirin 81 MG chewable tablet Take 81 mg by mouth daily    Historical Provider, MD       Past Medical History:  Past Medical History:   Diagnosis Date    Depression     GERD (gastroesophageal reflux disease)     Headache     Hyperlipidemia     Hypertension     Insomnia     Vitamin D deficiency        Past Surgical History:  Past Surgical History:   Procedure Laterality Date    ADENOIDECTOMY      BREAST BIOPSY      two    COLONOSCOPY      OVARY REMOVAL      right    TONSILLECTOMY         Social History:  Social History     Tobacco Use    Smoking status: Never Smoker    Smokeless tobacco: Never Used   Substance Use Topics    Alcohol use: Yes    Drug use: No       Vital Signs: There were no vitals filed for this visit. Physical Exam:  Cardiac:  [x]WNL []Comments:  Pulmonary:  [x]WNL   []Comments:  Neuro/Mental Status:  [x]WNL  []Comments:  Abdominal:  [x]WNL    []Comments:  Other:   []WNL  []Comments:    Informed Consent:  The risks and benefits of the procedure have been discussed with either the patient or if they cannot consent, their representative. Assessment:  Patient examined and appropriate for planned sedation and procedure. Plan:  Proceed with planned sedation and procedure as above. Franky Muñoz am scribing for and in the presence of Dr. Timothy Mckeon MD.  Electronically signed by Melissa Ly RN on 2/10/2022 at 11:54 AM    I personally performed the services described in this documentation as scribed by Laisha Chun, and it appears accurate and complete.      Timothy Mckeon MD  2/10/2022

## 2022-02-10 NOTE — ANESTHESIA PRE PROCEDURE
Department of Anesthesiology  Preprocedure Note       Name:  Janes Horton   Age:  57 y.o.  :  1960                                          MRN:  [id number]         Date:  2/10/2022      Surgeon: Melissa Esposito):  Lana Roldan MD    Procedure: Procedure(s):  COLORECTAL CANCER SCREENING, NOT HIGH RISK    Medications prior to admission:   Prior to Admission medications    Medication Sig Start Date End Date Taking? Authorizing Provider   metoprolol succinate (TOPROL XL) 50 MG extended release tablet TAKE 1 TABLET BY MOUTH DAILY 22  Yes Karoline Segal MD   FLUoxetine (PROZAC) 40 MG capsule TAKE ONE CAPSULE BY MOUTH DAILY 22  Yes Karoline Segal MD   pantoprazole (PROTONIX) 40 MG tablet TAKE 1 TABLET BY MOUTH EVERY DAY 22  Yes Karoline Segal MD   vitamin D (ERGOCALCIFEROL) 1.25 MG (06565 UT) CAPS capsule Take 1 capsule by mouth once weekly 21  Yes Karoline Segal MD   rosuvastatin (CRESTOR) 10 MG tablet TAKE 1 TABLET BY MOUTH EVERY NIGHT AT BEDTIME 21  Yes Karoline Segal MD   fluticasone (FLONASE) 50 MCG/ACT nasal spray SHAKE LIQUID AND USE 2 SPRAYS IN EACH NOSTRIL DAILY 21  Yes Karoline Segal MD   busPIRone (BUSPAR) 15 MG tablet TAKE 1 TABLET BY MOUTH TWICE DAILY AS NEEDED FOR ANXIETY 20  Yes Karoline Segal MD   Cholecalciferol (VITAMIN D3) 2000 units CAPS Take by mouth   Yes Historical Provider, MD   denosumab (PROLIA) 60 MG/ML SOSY SC injection Inject 1 mL into the skin every 6 months 21   Karoline Segal MD   rosuvastatin (CRESTOR) 10 MG tablet TAKE 1 TABLET BY MOUTH EVERY NIGHT 21   Karoline Segal MD   aspirin 81 MG chewable tablet Take 81 mg by mouth daily    Historical Provider, MD       Current medications:    Current Facility-Administered Medications   Medication Dose Route Frequency Provider Last Rate Last Admin    0.9 % sodium chloride infusion   IntraVENous Continuous Lana Roldan  mL/hr at 02/10/22 1208 NoRateChange at 02/10/22 1208       Allergies:     Allergies   Allergen Reactions    Morphine And Related        Problem List:    Patient Active Problem List   Diagnosis Code    Pure hypercholesterolemia E78.00    Vitamin D deficiency E55.9    Essential hypertension I10    Localized osteoporosis without current pathological fracture M81.6    Rheumatoid arthritis involving both hands with positive rheumatoid factor (HCC) M05.741, M05.742    Primary insomnia F51.01    Generalized anxiety disorder F41.1    Reactive depression F32.9    Right temporal headache R51.9    Vision changes H53.9    Chronic pain of both knees M25.561, M25.562, G89.29    Facial skin lesion L98.9       Past Medical History:        Diagnosis Date    Depression     Generalized anxiety disorder     GERD (gastroesophageal reflux disease)     Headache     Hyperlipidemia     Hypertension     Insomnia     Vitamin D deficiency        Past Surgical History:        Procedure Laterality Date    ADENOIDECTOMY      BREAST BIOPSY      two    COLONOSCOPY      OVARY REMOVAL      right    TONSILLECTOMY         Social History:    Social History     Tobacco Use    Smoking status: Never Smoker    Smokeless tobacco: Never Used   Substance Use Topics    Alcohol use:  Yes                                Counseling given: Not Answered      Vital Signs (Current):   Vitals:    02/10/22 1157   BP: (!) 147/97   Pulse: 73   Resp: 17   Temp: 97.6 °F (36.4 °C)   TempSrc: Temporal   SpO2: 94%   Weight: 183 lb (83 kg)   Height: 5' 5\" (1.651 m)                                              BP Readings from Last 3 Encounters:   02/10/22 (!) 147/97   12/07/21 122/88   06/07/21 132/86       NPO Status: Time of last liquid consumption: 0800                        Time of last solid consumption: 1700                        Date of last liquid consumption: 02/10/22                        Date of last solid food consumption: 02/08/22    BMI:   Wt Readings from Last 3 Encounters:   02/10/22 183 lb (83 kg)   12/07/21 184 lb (83.5 kg) 06/07/21 185 lb (83.9 kg)     Body mass index is 30.45 kg/m². CBC:   Lab Results   Component Value Date    WBC 7.4 12/03/2021    RBC 4.89 12/03/2021    HGB 13.9 12/03/2021    HCT 42.5 12/03/2021    MCV 86.9 12/03/2021    RDW 14.7 12/03/2021     12/03/2021       CMP:   Lab Results   Component Value Date     12/03/2021     08/02/2011    K 4.1 12/03/2021    K 4.2 08/02/2011     12/03/2021     08/02/2011    CO2 23 12/03/2021    BUN 20 12/03/2021    CREATININE 0.9 12/03/2021    CREATININE 0.9 08/02/2011    GFRAA >59 12/03/2021    LABGLOM >60 12/03/2021    GLUCOSE 99 12/03/2021    PROT 7.0 12/03/2021    PROT 6.7 08/02/2011    CALCIUM 9.2 12/03/2021    BILITOT 0.8 12/03/2021    ALKPHOS 114 12/03/2021    ALKPHOS 153 08/02/2011    AST 15 12/03/2021    ALT 15 12/03/2021       POC Tests: No results for input(s): POCGLU, POCNA, POCK, POCCL, POCBUN, POCHEMO, POCHCT in the last 72 hours. Coags: No results found for: PROTIME, INR, APTT    HCG (If Applicable): No results found for: PREGTESTUR, PREGSERUM, HCG, HCGQUANT     ABGs: No results found for: PHART, PO2ART, IOH7NIV, SAP7MXA, BEART, K1BTSMIX     Type & Screen (If Applicable):  No results found for: LABABO, LABRH    Drug/Infectious Status (If Applicable):  No results found for: HIV, HEPCAB    COVID-19 Screening (If Applicable):   Lab Results   Component Value Date    COVID19 Not Detected 02/07/2022           Anesthesia Evaluation  Patient summary reviewed  Airway: Mallampati: II  TM distance: >3 FB   Neck ROM: full  Mouth opening: < 3 FB Dental: normal exam         Pulmonary:Negative Pulmonary ROS and normal exam                               Cardiovascular:    (+) hypertension:, hyperlipidemia         Beta Blocker:  Dose within 24 Hrs         Neuro/Psych:   (+) headaches:, depression/anxiety             GI/Hepatic/Renal:   (+) bowel prep,          ROS comment: etoh use per chart. Endo/Other:    (+) : arthritis: rheumatoid. , .                 Abdominal:             Vascular: negative vascular ROS. Other Findings:             Anesthesia Plan      general and TIVA     ASA 2       Induction: intravenous. Anesthetic plan and risks discussed with patient.                       RENNY Zhang - CRNA   2/10/2022

## 2022-03-10 ENCOUNTER — TELEPHONE (OUTPATIENT)
Dept: INTERNAL MEDICINE | Age: 62
End: 2022-03-10

## 2022-03-10 DIAGNOSIS — M25.561 CHRONIC PAIN OF BOTH KNEES: ICD-10-CM

## 2022-03-10 DIAGNOSIS — M81.6 LOCALIZED OSTEOPOROSIS WITHOUT CURRENT PATHOLOGICAL FRACTURE: ICD-10-CM

## 2022-03-10 DIAGNOSIS — M25.562 CHRONIC PAIN OF BOTH KNEES: ICD-10-CM

## 2022-03-10 DIAGNOSIS — G89.29 CHRONIC PAIN OF BOTH KNEES: ICD-10-CM

## 2022-03-10 RX ORDER — FLUTICASONE PROPIONATE 50 MCG
SPRAY, SUSPENSION (ML) NASAL
Qty: 16 G | Refills: 2 | Status: SHIPPED | OUTPATIENT
Start: 2022-03-10 | End: 2022-06-02

## 2022-03-10 RX ORDER — HYDROCODONE BITARTRATE AND ACETAMINOPHEN 7.5; 325 MG/1; MG/1
1 TABLET ORAL DAILY PRN
Qty: 30 TABLET | Refills: 0 | Status: SHIPPED | OUTPATIENT
Start: 2022-03-10 | End: 2022-06-01 | Stop reason: SDUPTHER

## 2022-03-10 NOTE — TELEPHONE ENCOUNTER
Rukhsana Go called requesting a refill of the below medication which has been pended for you:     Requested Prescriptions     Pending Prescriptions Disp Refills    fluticasone (FLONASE) 50 MCG/ACT nasal spray 16 g 2     Sig: SHAKE LIQUID AND USE 2 SPRAYS IN EACH NOSTRIL DAILY    HYDROcodone-acetaminophen (NORCO) 7.5-325 MG per tablet 30 tablet 0     Sig: Take 1 tablet by mouth daily as needed for Pain for up to 30 days.     denosumab (PROLIA) 60 MG/ML SOSY SC injection 1 mL 1     Sig: Inject 1 mL into the skin every 6 months       Last Appointment Date: 12/7/2021  Next Appointment Date: 6/9/2022    Allergies   Allergen Reactions    Morphine And Related

## 2022-03-10 NOTE — TELEPHONE ENCOUNTER
S/w pt, she would like a refill on Flonase and Hydrocodone sent to [facility] . Also, she would like a PA on her Prolia.

## 2022-03-15 ENCOUNTER — TELEPHONE (OUTPATIENT)
Dept: INTERNAL MEDICINE | Age: 62
End: 2022-03-15

## 2022-03-15 RX ORDER — IBANDRONATE SODIUM 150 MG/1
150 TABLET, FILM COATED ORAL
Qty: 30 TABLET | Refills: 3 | Status: SHIPPED | OUTPATIENT
Start: 2022-03-15 | End: 2022-06-13 | Stop reason: SDUPTHER

## 2022-03-15 NOTE — TELEPHONE ENCOUNTER
Pt;s plan states that the Prolia is excluded from her drug plan. Explained this to the pt and per Dr. Daniel Harris we are going to send in [place] instead. PT voiced understanding.

## 2022-03-28 RX ORDER — ROSUVASTATIN CALCIUM 10 MG/1
TABLET, COATED ORAL
Qty: 90 TABLET | Refills: 1 | Status: SHIPPED | OUTPATIENT
Start: 2022-03-28

## 2022-03-28 NOTE — TELEPHONE ENCOUNTER
Rukhsana Go called requesting a refill of the below medication which has been pended for you:     Requested Prescriptions     Pending Prescriptions Disp Refills    rosuvastatin (CRESTOR) 10 MG tablet [Pharmacy Med Name: ROSUVASTATIN 10MG TABLETS] 90 tablet 1     Sig: TAKE 1 TABLET BY MOUTH EVERY NIGHT AT BEDTIME       Last Appointment Date: 12/7/2021  Next Appointment Date: 6/9/2022    Allergies   Allergen Reactions    Morphine And Related

## 2022-04-12 ENCOUNTER — TELEPHONE (OUTPATIENT)
Dept: INTERNAL MEDICINE | Age: 62
End: 2022-04-12

## 2022-04-12 NOTE — TELEPHONE ENCOUNTER
Pt would like something called in for sinus, ear pain. Declined VV. Pt uses [facility] at [facility].

## 2022-04-13 ENCOUNTER — OFFICE VISIT (OUTPATIENT)
Dept: INTERNAL MEDICINE | Age: 62
End: 2022-04-13
Payer: COMMERCIAL

## 2022-04-13 VITALS
BODY MASS INDEX: 30.82 KG/M2 | OXYGEN SATURATION: 96 % | SYSTOLIC BLOOD PRESSURE: 120 MMHG | RESPIRATION RATE: 18 BRPM | WEIGHT: 185 LBS | DIASTOLIC BLOOD PRESSURE: 80 MMHG | HEIGHT: 65 IN | HEART RATE: 76 BPM

## 2022-04-13 DIAGNOSIS — J20.9 ACUTE BRONCHITIS AND BRONCHIOLITIS: Primary | ICD-10-CM

## 2022-04-13 DIAGNOSIS — J01.00 ACUTE NON-RECURRENT MAXILLARY SINUSITIS: ICD-10-CM

## 2022-04-13 DIAGNOSIS — J34.89 SINUS PRESSURE: ICD-10-CM

## 2022-04-13 DIAGNOSIS — J21.9 ACUTE BRONCHITIS AND BRONCHIOLITIS: Primary | ICD-10-CM

## 2022-04-13 DIAGNOSIS — R05.9 COUGH: ICD-10-CM

## 2022-04-13 PROCEDURE — 99213 OFFICE O/P EST LOW 20 MIN: CPT | Performed by: INTERNAL MEDICINE

## 2022-04-13 RX ORDER — CEFDINIR 300 MG/1
300 CAPSULE ORAL 2 TIMES DAILY
Qty: 14 CAPSULE | Refills: 0 | Status: SHIPPED | OUTPATIENT
Start: 2022-04-13 | End: 2022-04-20

## 2022-04-13 RX ORDER — PREDNISONE 10 MG/1
10 TABLET ORAL DAILY
Qty: 5 TABLET | Refills: 0 | Status: SHIPPED | OUTPATIENT
Start: 2022-04-13 | End: 2022-04-18

## 2022-04-13 RX ORDER — PROMETHAZINE HYDROCHLORIDE AND CODEINE PHOSPHATE 6.25; 1 MG/5ML; MG/5ML
5 SYRUP ORAL 2 TIMES DAILY PRN
Qty: 100 ML | Refills: 0 | Status: SHIPPED | OUTPATIENT
Start: 2022-04-13 | End: 2022-05-05 | Stop reason: SDUPTHER

## 2022-04-13 RX ORDER — BUSPIRONE HYDROCHLORIDE 15 MG/1
TABLET ORAL
Qty: 60 TABLET | Refills: 2 | Status: SHIPPED | OUTPATIENT
Start: 2022-04-13 | End: 2022-06-27

## 2022-04-13 ASSESSMENT — ENCOUNTER SYMPTOMS
CONSTIPATION: 0
RHINORRHEA: 1
SINUS PAIN: 1
WHEEZING: 0
ABDOMINAL PAIN: 0
CHEST TIGHTNESS: 0
COUGH: 1
SINUS PRESSURE: 1
SORE THROAT: 0

## 2022-04-13 ASSESSMENT — PATIENT HEALTH QUESTIONNAIRE - PHQ9
1. LITTLE INTEREST OR PLEASURE IN DOING THINGS: 0
SUM OF ALL RESPONSES TO PHQ9 QUESTIONS 1 & 2: 0
SUM OF ALL RESPONSES TO PHQ QUESTIONS 1-9: 0
2. FEELING DOWN, DEPRESSED OR HOPELESS: 0
SUM OF ALL RESPONSES TO PHQ QUESTIONS 1-9: 0

## 2022-04-13 NOTE — PROGRESS NOTES
Chief Complaint   Patient presents with    Otalgia     Bilateral on going for 4 days, has pains that shoot through them off and on    Chest Congestion     On going for about 1 week, has taken sinus pills OTC    Cough     On going for about 3-4 days    Headache     History of presenting illness:  Virgene Sacks is a57 y.o. female who presents today for follow up on her chronic medical conditions as noted below.       Patient Active Problem List    Diagnosis Date Noted    Chronic pain of both knees 08/01/2018    Facial skin lesion 08/01/2018    Right temporal headache 09/21/2017    Vision changes 09/21/2017    Pure hypercholesterolemia 09/10/2017    Vitamin D deficiency 09/10/2017    Essential hypertension 09/10/2017    Localized osteoporosis without current pathological fracture 09/10/2017     Overview Note:     2/14 Lspine -2.1/ hi neck -2.6; 1/2017 Lspine -1.8; hip neck -2.6  3/2021 hip neck -2.8; L spine -1.8      Rheumatoid arthritis involving both hands with positive rheumatoid factor ([facility] [address] 09/10/2017     Overview Note:     Dx 2011 RA/ CCP ne      Primary insomnia 09/10/2017    Generalized anxiety disorder 09/10/2017    Reactive depression 09/10/2017     Past Medical History:   Diagnosis Date    Depression     Generalized anxiety disorder     GERD (gastroesophageal reflux disease)     Headache     Hyperlipidemia     Hypertension     Insomnia     Osteoarthritis     Vitamin D deficiency       Past Surgical History:   Procedure Laterality Date    ADENOIDECTOMY      BREAST BIOPSY      BREAST BIOPSY      COLONOSCOPY      COLONOSCOPY N/A 02/10/2022    Dr Doron Torres-Questionable lipoma-BCM, 5 yr recall    OVARY REMOVAL Right     TONSILLECTOMY       Current Outpatient Medications   Medication Sig Dispense Refill    busPIRone (BUSPAR) 15 MG tablet TAKE 1 TABLET BY MOUTH TWICE DAILY AS NEEDED FOR ANXIETY 60 tablet 2    promethazine-codeine (PHENERGAN WITH CODEINE) 6.25-10 MG/5ML syrup Take 5 mLs by mouth 2 times daily as needed for Cough for up to 14 days. 100 mL 0    cefdinir (OMNICEF) 300 MG capsule Take 1 capsule by mouth 2 times daily for 7 days 14 capsule 0    predniSONE (DELTASONE) 10 MG tablet Take 1 tablet by mouth daily for 5 days 5 tablet 0    rosuvastatin (CRESTOR) 10 MG tablet TAKE 1 TABLET BY MOUTH EVERY NIGHT AT BEDTIME 90 tablet 1    ibandronate (BONIVA) 150 MG tablet Take 1 tablet by mouth every 30 days Take one (1) tablet once per month in the morning with a full glass of water, on an empty stomach, and do not take anything else by mouth or lie down for the next 30 minutes. 30 tablet 3    fluticasone (FLONASE) 50 MCG/ACT nasal spray SHAKE LIQUID AND USE 2 SPRAYS IN EACH NOSTRIL DAILY 16 g 2    metoprolol succinate (TOPROL XL) 50 MG extended release tablet TAKE 1 TABLET BY MOUTH DAILY 90 tablet 1    FLUoxetine (PROZAC) 40 MG capsule TAKE ONE CAPSULE BY MOUTH DAILY 90 capsule 1    pantoprazole (PROTONIX) 40 MG tablet TAKE 1 TABLET BY MOUTH EVERY DAY 90 tablet 1    vitamin D (ERGOCALCIFEROL) 1.25 MG (21531 UT) CAPS capsule Take 1 capsule by mouth once weekly 12 capsule 1    Cholecalciferol (VITAMIN D3) 2000 units CAPS Take by mouth      aspirin 81 MG chewable tablet Take 81 mg by mouth daily       No current facility-administered medications for this visit. Allergies   Allergen Reactions    Morphine And Related      Social History     Tobacco Use    Smoking status: Never Smoker    Smokeless tobacco: Never Used   Substance Use Topics    Alcohol use: Yes      Family History   Problem Relation Age of Onset    Lung Cancer Father     Colon Cancer Sister 62        COLON    Other Sister         PANCREATIC CANCER    Diabetes Brother     Coronary Art Dis Brother        Review of Systems   Constitutional: Positive for fatigue. Negative for chills and fever. HENT: Positive for congestion, postnasal drip, rhinorrhea, sinus pressure and sinus pain.  Negative for ear pain, nosebleeds and sore throat. Respiratory: Positive for cough. Negative for chest tightness and wheezing. Cardiovascular: Negative for chest pain, palpitations and leg swelling. Gastrointestinal: Negative for abdominal pain and constipation. Genitourinary: Negative for dysuria and urgency. Musculoskeletal: Negative. Negative for arthralgias. Skin: Negative for rash. Neurological: Negative for dizziness and headaches. Psychiatric/Behavioral: Negative. Vitals:    04/13/22 0733   BP: 120/80   Site: Left Upper Arm   Position: Sitting   Cuff Size: Large Adult   Pulse: 76   Resp: 18   SpO2: 96%   Weight: 185 lb (83.9 kg)   Height: 5' 5\" (1.651 m)     Body mass index is 30.79 kg/m². Physical Exam  Constitutional:       Appearance: She is well-developed. HENT:      Right Ear: External ear normal.      Left Ear: External ear normal.      Nose: Congestion present. Mouth/Throat:      Pharynx: No oropharyngeal exudate. Eyes:      Conjunctiva/sclera: Conjunctivae normal.      Pupils: Pupils are equal, round, and reactive to light. Neck:      Thyroid: No thyromegaly. Vascular: No JVD. Cardiovascular:      Rate and Rhythm: Normal rate. Heart sounds: Normal heart sounds. No murmur heard. Pulmonary:      Effort: No respiratory distress. Breath sounds: Rhonchi and rales present. No wheezing. Chest:      Chest wall: No tenderness. Abdominal:      General: Bowel sounds are normal.      Palpations: Abdomen is soft. Musculoskeletal:      Cervical back: Neck supple. Lymphadenopathy:      Cervical: No cervical adenopathy. Skin:     Findings: No rash. Lab Review   No visits with results within 2 Month(s) from this visit.    Latest known visit with results is:   Orders Only on 02/07/2022   Component Date Value    SARS-CoV-2, PCR 02/07/2022 Not Detected            ASSESSMENT/PLAN:      Acute bronchitis and bronchiolitis    Acute non-recurrent maxillary sinusitis    Cough    Sinus pressure    RX  -     busPIRone (BUSPAR) 15 MG tablet; TAKE 1 TABLET BY MOUTH TWICE DAILY AS NEEDED FOR ANXIETY  -     cefdinir (OMNICEF) 300 MG capsule; Take 1 capsule by mouth 2 times daily for 7 days  -     predniSONE (DELTASONE) 10 MG tablet; Take 1 tablet by mouth daily for 5 days  -     promethazine-codeine (PHENERGAN WITH CODEINE) 6.25-10 MG/5ML syrup; Take 5 mLs by mouth 2 times daily as needed for Cough for up to 14 days. Additional recommendations  Use your Flonase no spray 2 sprays each side once daily at least for next 2 to 3 weeks  Use of Xyzal or Zyrtec daily    If sx not improving and resolving , if sx continue or re-occur pt has been instructed to call us and / or return here for follow- up evaluation              No orders of the defined types were placed in this encounter. New Prescriptions    CEFDINIR (OMNICEF) 300 MG CAPSULE    Take 1 capsule by mouth 2 times daily for 7 days    PREDNISONE (DELTASONE) 10 MG TABLET    Take 1 tablet by mouth daily for 5 days    PROMETHAZINE-CODEINE (PHENERGAN WITH CODEINE) 6.25-10 MG/5ML SYRUP    Take 5 mLs by mouth 2 times daily as needed for Cough for up to 14 days. No follow-ups on file. There are no Patient Instructions on file for this visit. EMR Dragon/transcription disclaimer:Significant part of this  encounter note is electronic transcription/translationof spoken language to printed text. The electronic translation of spoken language may be erroneous, or at times, nonsensical words or phrases may be inadvertently transcribed.  Although I have reviewed the note for sucherrors, some may still exist.

## 2022-05-05 ENCOUNTER — TELEPHONE (OUTPATIENT)
Dept: INTERNAL MEDICINE | Age: 62
End: 2022-05-05

## 2022-05-05 DIAGNOSIS — R05.9 COUGH: ICD-10-CM

## 2022-05-05 RX ORDER — PROMETHAZINE HYDROCHLORIDE AND CODEINE PHOSPHATE 6.25; 1 MG/5ML; MG/5ML
5 SYRUP ORAL 2 TIMES DAILY PRN
Qty: 50 ML | Refills: 0 | Status: SHIPPED | OUTPATIENT
Start: 2022-05-05 | End: 2022-05-12

## 2022-05-05 NOTE — TELEPHONE ENCOUNTER
Pt advised to take daily allergy medication and we will send in cough medication but to cb we will need to see her next week to f/u and do a chest xray

## 2022-05-05 NOTE — TELEPHONE ENCOUNTER
S/w pt, she would like a refill on promethazine-codeine (PHENERGAN WITH CODEINE) 6.25-10 MG/5ML syrup    And  cefdinir (OMNICEF) 300 MG capsule    Sent to [facility] .

## 2022-05-10 ENCOUNTER — TELEPHONE (OUTPATIENT)
Dept: INTERNAL MEDICINE | Age: 62
End: 2022-05-10

## 2022-05-10 NOTE — TELEPHONE ENCOUNTER
Patient is having an ongoing cough that's been going on over a month and would like to speak with nurse regarding this.

## 2022-05-12 ENCOUNTER — OFFICE VISIT (OUTPATIENT)
Dept: INTERNAL MEDICINE | Age: 62
End: 2022-05-12
Payer: COMMERCIAL

## 2022-05-12 ENCOUNTER — HOSPITAL ENCOUNTER (OUTPATIENT)
Dept: GENERAL RADIOLOGY | Age: 62
Discharge: HOME | End: 2022-05-12
Payer: COMMERCIAL

## 2022-05-12 VITALS
TEMPERATURE: 99.3 F | HEIGHT: 65 IN | OXYGEN SATURATION: 98 % | BODY MASS INDEX: 31.32 KG/M2 | DIASTOLIC BLOOD PRESSURE: 82 MMHG | WEIGHT: 188 LBS | HEART RATE: 75 BPM | SYSTOLIC BLOOD PRESSURE: 124 MMHG

## 2022-05-12 DIAGNOSIS — J21.9 ACUTE BRONCHITIS AND BRONCHIOLITIS: Primary | ICD-10-CM

## 2022-05-12 DIAGNOSIS — J20.9 ACUTE BRONCHITIS AND BRONCHIOLITIS: Primary | ICD-10-CM

## 2022-05-12 DIAGNOSIS — J68.3 REACTIVE AIRWAYS DYSFUNCTION SYNDROME (HCC): ICD-10-CM

## 2022-05-12 DIAGNOSIS — R05.9 COUGH: ICD-10-CM

## 2022-05-12 PROCEDURE — 99213 OFFICE O/P EST LOW 20 MIN: CPT | Performed by: INTERNAL MEDICINE

## 2022-05-12 PROCEDURE — 71046 X-RAY EXAM CHEST 2 VIEWS: CPT

## 2022-05-12 RX ORDER — ALBUTEROL SULFATE 90 UG/1
2 AEROSOL, METERED RESPIRATORY (INHALATION) 4 TIMES DAILY PRN
Qty: 18 G | Refills: 0 | Status: SHIPPED | OUTPATIENT
Start: 2022-05-12 | End: 2022-06-03

## 2022-05-12 RX ORDER — PREDNISONE 10 MG/1
TABLET ORAL
Qty: 12 TABLET | Refills: 0 | Status: SHIPPED | OUTPATIENT
Start: 2022-05-12 | End: 2022-06-13 | Stop reason: CLARIF

## 2022-05-12 RX ORDER — PROMETHAZINE HYDROCHLORIDE AND CODEINE PHOSPHATE 6.25; 1 MG/5ML; MG/5ML
5 SYRUP ORAL 2 TIMES DAILY PRN
Qty: 100 ML | Refills: 0 | Status: SHIPPED | OUTPATIENT
Start: 2022-05-12 | End: 2022-05-26

## 2022-05-12 ASSESSMENT — ENCOUNTER SYMPTOMS
CHEST TIGHTNESS: 0
CONSTIPATION: 0
ABDOMINAL PAIN: 0
COUGH: 1
SORE THROAT: 0
WHEEZING: 1

## 2022-05-12 NOTE — PROGRESS NOTES
Chief Complaint   Patient presents with    Cough     for a month      History of presenting illness:  Zeenat Luevano is a57 y.o. female who presents today for follow up on her chronic medical conditions as noted below.       Patient Active Problem List    Diagnosis Date Noted    Chronic pain of both knees 08/01/2018    Facial skin lesion 08/01/2018    Right temporal headache 09/21/2017    Vision changes 09/21/2017    Pure hypercholesterolemia 09/10/2017    Vitamin D deficiency 09/10/2017    Essential hypertension 09/10/2017    Localized osteoporosis without current pathological fracture 09/10/2017     Overview Note:     2/14 Lspine -2.1/ hi neck -2.6; 1/2017 Lspine -1.8; hip neck -2.6  3/2021 hip neck -2.8; L spine -1.8      Rheumatoid arthritis involving both hands with positive rheumatoid factor ([facility] [address] 09/10/2017     Overview Note:     Dx 2011 RA/ CCP ne      Primary insomnia 09/10/2017    Generalized anxiety disorder 09/10/2017    Reactive depression 09/10/2017     Past Medical History:   Diagnosis Date    Depression     Generalized anxiety disorder     GERD (gastroesophageal reflux disease)     Headache     Hyperlipidemia     Hypertension     Insomnia     Osteoarthritis     Vitamin D deficiency       Past Surgical History:   Procedure Laterality Date    ADENOIDECTOMY      BREAST BIOPSY      BREAST BIOPSY      COLONOSCOPY      COLONOSCOPY N/A 02/10/2022    Dr Evelin Torres-Questionable lipoma-BCM, 5 yr recall    OVARY REMOVAL Right     TONSILLECTOMY       Current Outpatient Medications   Medication Sig Dispense Refill    predniSONE (DELTASONE) 10 MG tablet Take 10 mg twice daily for 2 days, then take 10 mg once daily for 4 days 12 tablet 0    albuterol sulfate HFA (VENTOLIN HFA) 108 (90 Base) MCG/ACT inhaler Inhale 2 puffs into the lungs 4 times daily as needed for Wheezing 18 g 0    promethazine-codeine (PHENERGAN WITH CODEINE) 6.25-10 MG/5ML syrup Take 5 mLs by mouth 2 times daily as needed for Cough for up to 14 days. 100 mL 0    promethazine-codeine (PHENERGAN WITH CODEINE) 6.25-10 MG/5ML syrup Take 5 mLs by mouth 2 times daily as needed for Cough for up to 7 days. 50 mL 0    busPIRone (BUSPAR) 15 MG tablet TAKE 1 TABLET BY MOUTH TWICE DAILY AS NEEDED FOR ANXIETY 60 tablet 2    rosuvastatin (CRESTOR) 10 MG tablet TAKE 1 TABLET BY MOUTH EVERY NIGHT AT BEDTIME 90 tablet 1    ibandronate (BONIVA) 150 MG tablet Take 1 tablet by mouth every 30 days Take one (1) tablet once per month in the morning with a full glass of water, on an empty stomach, and do not take anything else by mouth or lie down for the next 30 minutes. 30 tablet 3    fluticasone (FLONASE) 50 MCG/ACT nasal spray SHAKE LIQUID AND USE 2 SPRAYS IN EACH NOSTRIL DAILY 16 g 2    metoprolol succinate (TOPROL XL) 50 MG extended release tablet TAKE 1 TABLET BY MOUTH DAILY 90 tablet 1    FLUoxetine (PROZAC) 40 MG capsule TAKE ONE CAPSULE BY MOUTH DAILY 90 capsule 1    pantoprazole (PROTONIX) 40 MG tablet TAKE 1 TABLET BY MOUTH EVERY DAY 90 tablet 1    vitamin D (ERGOCALCIFEROL) 1.25 MG (35955 UT) CAPS capsule Take 1 capsule by mouth once weekly 12 capsule 1    Cholecalciferol (VITAMIN D3) 2000 units CAPS Take by mouth      aspirin 81 MG chewable tablet Take 81 mg by mouth daily       No current facility-administered medications for this visit. Allergies   Allergen Reactions    Morphine And Related      Social History     Tobacco Use    Smoking status: Never Smoker    Smokeless tobacco: Never Used   Substance Use Topics    Alcohol use: Yes      Family History   Problem Relation Age of Onset    Lung Cancer Father     Colon Cancer Sister 62        COLON    Other Sister         PANCREATIC CANCER    Diabetes Brother     Coronary Art Dis Brother        Review of Systems   Constitutional: Negative for chills, fatigue and fever. HENT: Positive for congestion.  Negative for ear pain, nosebleeds, postnasal drip and sore throat. Respiratory: Positive for cough and wheezing. Negative for chest tightness. Cardiovascular: Negative for chest pain, palpitations and leg swelling. Gastrointestinal: Negative for abdominal pain and constipation. Genitourinary: Negative for dysuria and urgency. Musculoskeletal: Negative. Negative for arthralgias. Skin: Negative for rash. Neurological: Negative for dizziness and headaches. Psychiatric/Behavioral: Negative. Vitals:    05/12/22 1232   BP: 124/82   Pulse: 75   Temp: 99.3 °F (37.4 °C)   SpO2: 98%   Weight: 188 lb (85.3 kg)   Height: 5' 5\" (1.651 m)     Body mass index is 31.28 kg/m². Physical Exam  Constitutional:       Appearance: She is well-developed. HENT:      Right Ear: External ear normal.      Left Ear: External ear normal.      Mouth/Throat:      Pharynx: No oropharyngeal exudate. Eyes:      Conjunctiva/sclera: Conjunctivae normal.      Pupils: Pupils are equal, round, and reactive to light. Neck:      Thyroid: No thyromegaly. Vascular: No JVD. Cardiovascular:      Rate and Rhythm: Normal rate. Heart sounds: Normal heart sounds. No murmur heard. Pulmonary:      Effort: No respiratory distress. Breath sounds: Wheezing and rales present. Chest:      Chest wall: No tenderness. Abdominal:      General: Bowel sounds are normal.      Palpations: Abdomen is soft. Musculoskeletal:      Cervical back: Neck supple. Lymphadenopathy:      Cervical: No cervical adenopathy. Skin:     Findings: No rash. Neurological:      Mental Status: She is oriented to person, place, and time. Lab Review   No visits with results within 2 Month(s) from this visit.    Latest known visit with results is:   Orders Only on 02/07/2022   Component Date Value    SARS-CoV-2, PCR 02/07/2022 Not Detected            ASSESSMENT/PLAN:      Acute bronchitis and bronchiolitis    Cough    Reactive airways dysfunction syndrome (HCC)    Coughing 5 weeks  Seen here 4/13/22  Was given RX omnicef + prednisone + cough syrup  Initially was some better but over the last 3 and half weeks has been coughing continuously  Taking cough syrup every night to help with sleep  Earache and sinus pressure symptoms have all resolved  Denies any shortness of breath  No fever chills    Recommendations  Obtain  -     XR CHEST (2 VW); Future    RX  -     promethazine-codeine (PHENERGAN WITH CODEINE) 6.25-10 MG/5ML syrup; Take 5 mLs by mouth 2 times daily as needed for Cough for up to 14 days. -     predniSONE (DELTASONE) 10 MG tablet; Take 10 mg twice daily for 2 days, then take 10 mg once daily for 4 days  -     albuterol sulfate HFA (VENTOLIN HFA) 108 (90 Base) MCG/ACT inhaler; Inhale 2 puffs into the lungs 4 times daily as needed for Wheezing          Bretzi inhaler sample- one dose daily  To pt  Patient recommend following  Patient to use Xyzal 5 mg p.o. daily  Take Mucinex 600 twice daily    If sx not improving and resolving , if sx continue or re-occur pt has been instructed to call us and / or return here for follow- up evaluation      Orders Placed This Encounter   Procedures    XR CHEST (2 VW)     New Prescriptions    ALBUTEROL SULFATE HFA (VENTOLIN HFA) 108 (90 BASE) MCG/ACT INHALER    Inhale 2 puffs into the lungs 4 times daily as needed for Wheezing    PREDNISONE (DELTASONE) 10 MG TABLET    Take 10 mg twice daily for 2 days, then take 10 mg once daily for 4 days    PROMETHAZINE-CODEINE (PHENERGAN WITH CODEINE) 6.25-10 MG/5ML SYRUP    Take 5 mLs by mouth 2 times daily as needed for Cough for up to 14 days. No follow-ups on file. There are no Patient Instructions on file for this visit. EMR Dragon/transcription disclaimer:Significant part of this  encounter note is electronic transcription/translationof spoken language to printed text.  The electronic translation of spoken language may be erroneous, or at times, nonsensical words or phrases may be inadvertently transcribed.  Although I have reviewed the note for sucherrors, some may still exist.

## 2022-05-27 RX ORDER — ERGOCALCIFEROL 1.25 MG/1
CAPSULE ORAL
Qty: 12 CAPSULE | Refills: 1 | Status: SHIPPED | OUTPATIENT
Start: 2022-05-27

## 2022-05-27 NOTE — TELEPHONE ENCOUNTER
Emery Loving called requesting a refill of the below medication which has been pended for you:     Requested Prescriptions     Pending Prescriptions Disp Refills    vitamin D (ERGOCALCIFEROL) 1.25 MG (09526 UT) CAPS capsule [Pharmacy Med Name: VITAMIN D2 50,000IU (ERGO) CAP RX] 12 capsule 1     Sig: TAKE 1 CAPSULE BY MOUTH 1 TIME WEEKLY       Last Appointment Date: 5/12/2022  Next Appointment Date: 6/9/2022    Allergies   Allergen Reactions    Morphine And Related

## 2022-06-01 DIAGNOSIS — M25.562 CHRONIC PAIN OF BOTH KNEES: ICD-10-CM

## 2022-06-01 DIAGNOSIS — G89.29 CHRONIC PAIN OF BOTH KNEES: ICD-10-CM

## 2022-06-01 DIAGNOSIS — M25.561 CHRONIC PAIN OF BOTH KNEES: ICD-10-CM

## 2022-06-01 RX ORDER — HYDROCODONE BITARTRATE AND ACETAMINOPHEN 7.5; 325 MG/1; MG/1
1 TABLET ORAL DAILY PRN
Qty: 30 TABLET | Refills: 0 | Status: SHIPPED | OUTPATIENT
Start: 2022-06-01 | End: 2022-08-10 | Stop reason: SDUPTHER

## 2022-06-01 NOTE — TELEPHONE ENCOUNTER
Preet Zapata called requesting a refill of the below medication which has been pended for you:     Requested Prescriptions     Pending Prescriptions Disp Refills    HYDROcodone-acetaminophen (NORCO) 7.5-325 MG per tablet 30 tablet 0     Sig: Take 1 tablet by mouth daily as needed for Pain for up to 30 days.        Last Appointment Date: 5/12/2022  Next Appointment Date: 6/9/2022    Allergies   Allergen Reactions    Morphine And Related

## 2022-06-01 NOTE — TELEPHONE ENCOUNTER
----- Message from Rosie Irving sent at 6/1/2022  1:41 PM CDT -----  Subject: Refill Request    QUESTIONS  Name of Medication? HYDROcodone-acetaminophen (NORCO) 7.5-325 MG per   tablet  Patient-reported dosage and instructions? 1 tab as needed   How many days do you have left? 0  Preferred Pharmacy? [id number]  Pharmacy phone number (if available)? [phone]  ---------------------------------------------------------------------------  --------------  CALL BACK INFO  What is the best way for the office to contact you? OK to leave message on   voicemail  Preferred Call Back Phone Number? [phone]  ---------------------------------------------------------------------------  --------------  SCRIPT ANSWERS  Relationship to Patient?  Self

## 2022-06-02 RX ORDER — FLUTICASONE PROPIONATE 50 MCG
SPRAY, SUSPENSION (ML) NASAL
Qty: 16 G | Refills: 2 | Status: SHIPPED | OUTPATIENT
Start: 2022-06-02 | End: 2022-08-24

## 2022-06-02 NOTE — TELEPHONE ENCOUNTER
[address] called requesting a refill of the below medication which has been pended for you:     Requested Prescriptions     Pending Prescriptions Disp Refills    fluticasone (FLONASE) 50 MCG/ACT nasal spray [Pharmacy Med Name: FLUTICASONE 50MCG NASAL SP (120) RX] 16 g 2     Sig: SHAKE LIQUID AND USE 2 SPRAYS IN EACH NOSTRIL DAILY       Last Appointment Date: 5/12/2022  Next Appointment Date: 6/9/2022    Allergies   Allergen Reactions    Morphine And Related

## 2022-06-03 RX ORDER — ALBUTEROL SULFATE 90 UG/1
AEROSOL, METERED RESPIRATORY (INHALATION)
Qty: 8.5 G | Refills: 1 | Status: SHIPPED | OUTPATIENT
Start: 2022-06-03 | End: 2022-07-14

## 2022-06-03 NOTE — TELEPHONE ENCOUNTER
Pablo Loera called requesting a refill of the below medication which has been pended for you:     Requested Prescriptions     Pending Prescriptions Disp Refills    albuterol sulfate  (90 Base) MCG/ACT inhaler [Pharmacy Med Name: ALBUTEROL HFA INH (200 PUFFS)8.5GM] 8.5 g 1     Sig: INHALE 2 PUFFS INTO THE LUNGS FOUR TIMES DAILY AS NEEDED FOR WHEEZING       Last Appointment Date: 5/12/2022  Next Appointment Date: 6/9/2022    Allergies   Allergen Reactions    Morphine And Related

## 2022-06-13 ENCOUNTER — OFFICE VISIT (OUTPATIENT)
Dept: INTERNAL MEDICINE | Age: 62
End: 2022-06-13
Payer: COMMERCIAL

## 2022-06-13 VITALS
BODY MASS INDEX: 31.49 KG/M2 | RESPIRATION RATE: 18 BRPM | SYSTOLIC BLOOD PRESSURE: 128 MMHG | OXYGEN SATURATION: 96 % | WEIGHT: 189 LBS | HEIGHT: 65 IN | DIASTOLIC BLOOD PRESSURE: 80 MMHG | HEART RATE: 78 BPM

## 2022-06-13 DIAGNOSIS — R05.9 COUGH: ICD-10-CM

## 2022-06-13 DIAGNOSIS — F51.01 PRIMARY INSOMNIA: ICD-10-CM

## 2022-06-13 DIAGNOSIS — Z00.00 ANNUAL PHYSICAL EXAM: ICD-10-CM

## 2022-06-13 DIAGNOSIS — M25.562 CHRONIC PAIN OF BOTH KNEES: ICD-10-CM

## 2022-06-13 DIAGNOSIS — I10 ESSENTIAL HYPERTENSION: Primary | ICD-10-CM

## 2022-06-13 DIAGNOSIS — I10 ESSENTIAL HYPERTENSION: ICD-10-CM

## 2022-06-13 DIAGNOSIS — Z12.31 ENCOUNTER FOR SCREENING MAMMOGRAM FOR BREAST CANCER: ICD-10-CM

## 2022-06-13 DIAGNOSIS — F41.1 GENERALIZED ANXIETY DISORDER: ICD-10-CM

## 2022-06-13 DIAGNOSIS — G89.29 CHRONIC PAIN OF BOTH KNEES: ICD-10-CM

## 2022-06-13 DIAGNOSIS — E55.9 VITAMIN D DEFICIENCY: ICD-10-CM

## 2022-06-13 DIAGNOSIS — E78.00 PURE HYPERCHOLESTEROLEMIA: ICD-10-CM

## 2022-06-13 DIAGNOSIS — M25.561 CHRONIC PAIN OF BOTH KNEES: ICD-10-CM

## 2022-06-13 LAB
ALBUMIN SERPL-MCNC: 4.1 G/DL (ref 3.5–5.2)
ALP BLD-CCNC: 105 U/L (ref 35–104)
ALT SERPL-CCNC: 20 U/L (ref 5–33)
ANION GAP SERPL CALCULATED.3IONS-SCNC: 12 MMOL/L (ref 7–19)
AST SERPL-CCNC: 20 U/L (ref 5–32)
BILIRUB SERPL-MCNC: 0.8 MG/DL (ref 0.2–1.2)
BUN BLDV-MCNC: 14 MG/DL (ref 8–23)
CALCIUM SERPL-MCNC: 9.3 MG/DL (ref 8.8–10.2)
CHLORIDE BLD-SCNC: 106 MMOL/L (ref 98–111)
CHOLESTEROL, TOTAL: 189 MG/DL (ref 160–199)
CO2: 24 MMOL/L (ref 22–29)
CREAT SERPL-MCNC: 0.8 MG/DL (ref 0.5–0.9)
GFR AFRICAN AMERICAN: >59
GFR NON-AFRICAN AMERICAN: >60
GLUCOSE BLD-MCNC: 88 MG/DL (ref 74–109)
HDLC SERPL-MCNC: 70 MG/DL (ref 65–121)
LDL CHOLESTEROL CALCULATED: 101 MG/DL
POTASSIUM SERPL-SCNC: 4 MMOL/L (ref 3.5–5)
SODIUM BLD-SCNC: 142 MMOL/L (ref 136–145)
TOTAL PROTEIN: 6.9 G/DL (ref 6.6–8.7)
TRIGL SERPL-MCNC: 90 MG/DL (ref 0–149)
TSH SERPL DL<=0.05 MIU/L-ACNC: 1.64 UIU/ML (ref 0.27–4.2)
VITAMIN D 25-HYDROXY: 43.8 NG/ML

## 2022-06-13 PROCEDURE — 99214 OFFICE O/P EST MOD 30 MIN: CPT | Performed by: INTERNAL MEDICINE

## 2022-06-13 RX ORDER — IBANDRONATE SODIUM 150 MG/1
150 TABLET, FILM COATED ORAL
Qty: 3 TABLET | Refills: 3 | Status: SHIPPED | OUTPATIENT
Start: 2022-06-13

## 2022-06-13 SDOH — ECONOMIC STABILITY: FOOD INSECURITY: WITHIN THE PAST 12 MONTHS, THE FOOD YOU BOUGHT JUST DIDN'T LAST AND YOU DIDN'T HAVE MONEY TO GET MORE.: NEVER TRUE

## 2022-06-13 SDOH — ECONOMIC STABILITY: FOOD INSECURITY: WITHIN THE PAST 12 MONTHS, YOU WORRIED THAT YOUR FOOD WOULD RUN OUT BEFORE YOU GOT MONEY TO BUY MORE.: NEVER TRUE

## 2022-06-13 ASSESSMENT — PATIENT HEALTH QUESTIONNAIRE - PHQ9
6. FEELING BAD ABOUT YOURSELF - OR THAT YOU ARE A FAILURE OR HAVE LET YOURSELF OR YOUR FAMILY DOWN: 0
4. FEELING TIRED OR HAVING LITTLE ENERGY: 0
SUM OF ALL RESPONSES TO PHQ QUESTIONS 1-9: 0
5. POOR APPETITE OR OVEREATING: 0
2. FEELING DOWN, DEPRESSED OR HOPELESS: 0
9. THOUGHTS THAT YOU WOULD BE BETTER OFF DEAD, OR OF HURTING YOURSELF: 0
SUM OF ALL RESPONSES TO PHQ QUESTIONS 1-9: 0
7. TROUBLE CONCENTRATING ON THINGS, SUCH AS READING THE NEWSPAPER OR WATCHING TELEVISION: 0
SUM OF ALL RESPONSES TO PHQ9 QUESTIONS 1 & 2: 0
SUM OF ALL RESPONSES TO PHQ QUESTIONS 1-9: 0
3. TROUBLE FALLING OR STAYING ASLEEP: 0
10. IF YOU CHECKED OFF ANY PROBLEMS, HOW DIFFICULT HAVE THESE PROBLEMS MADE IT FOR YOU TO DO YOUR WORK, TAKE CARE OF THINGS AT HOME, OR GET ALONG WITH OTHER PEOPLE: 0
SUM OF ALL RESPONSES TO PHQ QUESTIONS 1-9: 0
1. LITTLE INTEREST OR PLEASURE IN DOING THINGS: 0
8. MOVING OR SPEAKING SO SLOWLY THAT OTHER PEOPLE COULD HAVE NOTICED. OR THE OPPOSITE, BEING SO FIGETY OR RESTLESS THAT YOU HAVE BEEN MOVING AROUND A LOT MORE THAN USUAL: 0

## 2022-06-13 ASSESSMENT — ENCOUNTER SYMPTOMS
WHEEZING: 0
CHEST TIGHTNESS: 0
ABDOMINAL PAIN: 0
SORE THROAT: 0
CONSTIPATION: 0
COUGH: 1

## 2022-06-13 ASSESSMENT — SOCIAL DETERMINANTS OF HEALTH (SDOH): HOW HARD IS IT FOR YOU TO PAY FOR THE VERY BASICS LIKE FOOD, HOUSING, MEDICAL CARE, AND HEATING?: NOT HARD AT ALL

## 2022-06-13 NOTE — PROGRESS NOTES
Chief Complaint   Patient presents with    6 Month Follow-Up     History of presenting illness:  Car Carrasco is a57 y.o. female who presents today for follow up on her chronic medical conditions as noted below. Patient Active Problem List    Diagnosis Date Noted    Chronic pain of both knees 08/01/2018    Facial skin lesion 08/01/2018    Right temporal headache 09/21/2017    Vision changes 09/21/2017    Pure hypercholesterolemia 09/10/2017    Vitamin D deficiency 09/10/2017    Essential hypertension 09/10/2017    Localized osteoporosis without current pathological fracture 09/10/2017     Overview Note:     2/14 Lspine -2.1/ hi neck -2.6; 1/2017 Lspine -1.8; hip neck -2.6  3/2021 hip neck -2.8; L spine -1.8      Rheumatoid arthritis involving both hands with positive rheumatoid factor ([facility] [address] 09/10/2017     Overview Note:     Dx 2011 RA/ CCP ne      Primary insomnia 09/10/2017    Generalized anxiety disorder 09/10/2017    Reactive depression 09/10/2017     Past Medical History:   Diagnosis Date    Depression     Generalized anxiety disorder     GERD (gastroesophageal reflux disease)     Headache     Hyperlipidemia     Hypertension     Insomnia     Osteoarthritis     Vitamin D deficiency       Past Surgical History:   Procedure Laterality Date    ADENOIDECTOMY      BREAST BIOPSY      BREAST BIOPSY      COLONOSCOPY      COLONOSCOPY N/A 02/10/2022    Dr Saundra Torres-Questionable lipoma-BCM, 5 yr recall    OVARY REMOVAL Right     TONSILLECTOMY       Current Outpatient Medications   Medication Sig Dispense Refill    ibandronate (BONIVA) 150 MG tablet Take 1 tablet by mouth every 30 days Take one (1) tablet once per month in the morning with a full glass of water, on an empty stomach, and do not take anything else by mouth or lie down for the next 30 minutes.  3 tablet 3    albuterol sulfate  (90 Base) MCG/ACT inhaler INHALE 2 PUFFS INTO THE LUNGS FOUR TIMES DAILY AS NEEDED FOR WHEEZING 8.5 g 1    fluticasone (FLONASE) 50 MCG/ACT nasal spray SHAKE LIQUID AND USE 2 SPRAYS IN EACH NOSTRIL DAILY 16 g 2    HYDROcodone-acetaminophen (NORCO) 7.5-325 MG per tablet Take 1 tablet by mouth daily as needed for Pain for up to 30 days. 30 tablet 0    vitamin D (ERGOCALCIFEROL) 1.25 MG (84156 UT) CAPS capsule TAKE 1 CAPSULE BY MOUTH 1 TIME WEEKLY 12 capsule 1    busPIRone (BUSPAR) 15 MG tablet TAKE 1 TABLET BY MOUTH TWICE DAILY AS NEEDED FOR ANXIETY 60 tablet 2    rosuvastatin (CRESTOR) 10 MG tablet TAKE 1 TABLET BY MOUTH EVERY NIGHT AT BEDTIME 90 tablet 1    metoprolol succinate (TOPROL XL) 50 MG extended release tablet TAKE 1 TABLET BY MOUTH DAILY 90 tablet 1    FLUoxetine (PROZAC) 40 MG capsule TAKE ONE CAPSULE BY MOUTH DAILY 90 capsule 1    pantoprazole (PROTONIX) 40 MG tablet TAKE 1 TABLET BY MOUTH EVERY DAY 90 tablet 1    Cholecalciferol (VITAMIN D3) 2000 units CAPS Take by mouth      aspirin 81 MG chewable tablet Take 81 mg by mouth daily       No current facility-administered medications for this visit. Allergies   Allergen Reactions    Morphine And Related      Social History     Tobacco Use    Smoking status: Never Smoker    Smokeless tobacco: Never Used   Substance Use Topics    Alcohol use: Yes      Family History   Problem Relation Age of Onset    Lung Cancer Father     Colon Cancer Sister 62        COLON    Other Sister         PANCREATIC CANCER    Diabetes Brother     Coronary Art Dis Brother        Review of Systems   Constitutional: Negative for chills, fatigue and fever. HENT: Negative for congestion, ear pain, nosebleeds, postnasal drip and sore throat. Respiratory: Positive for cough. Negative for chest tightness and wheezing. Cardiovascular: Negative for chest pain, palpitations and leg swelling. Gastrointestinal: Negative for abdominal pain and constipation. Genitourinary: Negative for dysuria and urgency. Musculoskeletal: Negative.   Negative for arthralgias. Skin: Negative for rash. Neurological: Negative for dizziness and headaches. Psychiatric/Behavioral: Negative. Vitals:    06/13/22 0756   BP: 128/80   Site: Left Upper Arm   Position: Sitting   Cuff Size: Large Adult   Pulse: 78   Resp: 18   SpO2: 96%   Weight: 189 lb (85.7 kg)   Height: 5' 5\" (1.651 m)     Body mass index is 31.45 kg/m². Physical Exam  Constitutional:       Appearance: She is well-developed. HENT:      Right Ear: External ear normal.      Left Ear: External ear normal.      Mouth/Throat:      Pharynx: No oropharyngeal exudate. Eyes:      Conjunctiva/sclera: Conjunctivae normal.      Pupils: Pupils are equal, round, and reactive to light. Neck:      Thyroid: No thyromegaly. Vascular: No JVD. Cardiovascular:      Rate and Rhythm: Normal rate. Heart sounds: Normal heart sounds. No murmur heard. Pulmonary:      Effort: No respiratory distress. Breath sounds: Rhonchi and rales present. No wheezing. Chest:      Chest wall: No tenderness. Abdominal:      General: Bowel sounds are normal.      Palpations: Abdomen is soft. Musculoskeletal:      Cervical back: Neck supple. Lymphadenopathy:      Cervical: No cervical adenopathy. Skin:     General: Skin is warm. Findings: No rash. Neurological:      Mental Status: She is oriented to person, place, and time. Lab Review   No visits with results within 2 Month(s) from this visit.    Latest known visit with results is:   Orders Only on 02/07/2022   Component Date Value    SARS-CoV-2, PCR 02/07/2022 Not Detected            ASSESSMENT/PLAN:    Coughing on going for last 2 months  Seen here last 5/2022  Has been on rx omnicef, albuterol, prednisone, cough syrup  CXR negative 5/2022  Frequently she feels also tickle sensation in the throat  Does not seem to be any food relation  Patient has been compliant with her Protonix, she has been on Protonix for years has not had issues with reflux  And  Proceed with ENT referral for examination and opinion  May also need pulmonology referral with this ongoing cough for over 2 months  For now as the patient uses her inhaler every 8 hours         Essential hypertension-   Readings reviewed  Renal fx nl  rx metoprolol er 50 aily     Pure hypercholesterolemia-   LDL improved ZS 34  I have personally reviewed and interpreted these lab results and thoroughly discussed with patient  rx crestor 10 mg daily  Healthy, mostly fiber rich nonstarchy plant-based diet recommended  Recommend to decrease intake of processed foods, simple carbohydrates and animal-based products that high in saturated fats        Vitamin D deficiency-    vitamin D level low at 30   I have personally reviewed and interpreted these lab results and thoroughly discussed with patient  Restart vitamin D 50,000 IU weekly        Generalized anxiety disorder-she lost her mother in July 2020, 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  She is adjusting to new normal  rx fluoxetine daily        Osteoporosis       Localized osteoporosis without current pathological fracture 09/10/2017     2/14 Lspine -2.1/ hi neck -2.6; 1/2017 Lspine -1.8; hip neck -2.6  3/2021 hip neck -2.8; L spine -1.8         Did not take prolia ( was worried about taking this together with covid injection)        RA/ knee pains  Giving her prescription for hydrocodone for when necessary occasional use only, she usually only fills #30 every 6 months  Thor Rust was reviewed  On exam today and as per discussions with the patient today there is no evidence of adverse events such as cognitive impairment, sedation, constipation or falls related to prescribed medications.  There is also no evidence of aberrant behavior like lost prescriptions or early refill requests or multiple prescribers for controlled substances.     Patient  was advised NOT to attempt to drive a motor vehichle or operate any heavy machinery within 6 hrs of taking the presribed medication - hydrocodone          Orders Placed This Encounter   Procedures    CBC with Auto Differential    Comprehensive Metabolic Panel    Hemoglobin A1C    Vitamin D 25 Hydroxy    Urinalysis    TSH    Lipid Panel   [address], [address] [facility]     New Prescriptions    No medications on file         No follow-ups on file. There are no Patient Instructions on file for this visit. EMR Dragon/transcription disclaimer:Significant part of this  encounter note is electronic transcription/translationof spoken language to printed text. The electronic translation of spoken language may be erroneous, or at times, nonsensical words or phrases may be inadvertently transcribed.  Although I have reviewed the note for sucherrors, some may still exist.

## 2022-06-27 RX ORDER — BUSPIRONE HYDROCHLORIDE 15 MG/1
TABLET ORAL
Qty: 60 TABLET | Refills: 2 | Status: SHIPPED | OUTPATIENT
Start: 2022-06-27 | End: 2022-09-14

## 2022-06-29 ENCOUNTER — OFFICE VISIT (OUTPATIENT)
Dept: ENT CLINIC | Age: 62
End: 2022-06-29
Payer: COMMERCIAL

## 2022-06-29 VITALS — WEIGHT: 189 LBS | HEIGHT: 66 IN | BODY MASS INDEX: 30.37 KG/M2

## 2022-06-29 DIAGNOSIS — R05.3 CHRONIC COUGH: Primary | ICD-10-CM

## 2022-06-29 DIAGNOSIS — J45.909 REACTIVE AIRWAY DISEASE WITH WHEEZING WITHOUT COMPLICATION, UNSPECIFIED ASTHMA SEVERITY, UNSPECIFIED WHETHER PERSISTENT: ICD-10-CM

## 2022-06-29 PROCEDURE — 99203 OFFICE O/P NEW LOW 30 MIN: CPT | Performed by: PHYSICIAN ASSISTANT

## 2022-06-29 PROCEDURE — 31575 DIAGNOSTIC LARYNGOSCOPY: CPT | Performed by: PHYSICIAN ASSISTANT

## 2022-06-29 RX ORDER — MONTELUKAST SODIUM 10 MG/1
10 TABLET ORAL DAILY
Qty: 30 TABLET | Refills: 2 | Status: SHIPPED | OUTPATIENT
Start: 2022-06-29

## 2022-06-29 ASSESSMENT — ENCOUNTER SYMPTOMS
RHINORRHEA: 0
VOICE CHANGE: 0
SINUS PRESSURE: 0
SORE THROAT: 0
FACIAL SWELLING: 0
EYE PAIN: 0
SINUS PAIN: 0
EYE DISCHARGE: 0
TROUBLE SWALLOWING: 0

## 2022-06-29 NOTE — PROGRESS NOTES
[facility] OTOLARYNGOLOGY/ENT  Mrs. Dot Garnett is a pleasant 68-year-old  female that was referred by Dr. Eneida Brar due to a chronic cough. Patient reports that back in March she developed COVID that was treated at home. She reports that after recovering from [facility] she had a chronic cough that has never resolved. She reports that she has coughing episodes sporadically through the day that resulted in her becoming short of breath and becoming very red-faced. She reports that at times she feels like she is going to pass out but never experiences syncope. She denies the use of tobacco products and has never had any lung issues in the past.  Currently she has been utilizing albuterol inhalers for audible wheezing that is noted with her cough. She denies any productive cough with the episodes. Allergies: Morphine and related      Current Outpatient Medications   Medication Sig Dispense Refill    Levocetirizine Dihydrochloride (XYZAL PO) Take by mouth      montelukast (SINGULAIR) 10 MG tablet Take 1 tablet by mouth daily 30 tablet 2    busPIRone (BUSPAR) 15 MG tablet TAKE 1 TABLET BY MOUTH TWICE DAILY AS NEEDED FOR ANXIETY 60 tablet 2    ibandronate (BONIVA) 150 MG tablet Take 1 tablet by mouth every 30 days Take one (1) tablet once per month in the morning with a full glass of water, on an empty stomach, and do not take anything else by mouth or lie down for the next 30 minutes. 3 tablet 3    albuterol sulfate  (90 Base) MCG/ACT inhaler INHALE 2 PUFFS INTO THE LUNGS FOUR TIMES DAILY AS NEEDED FOR WHEEZING 8.5 g 1    fluticasone (FLONASE) 50 MCG/ACT nasal spray SHAKE LIQUID AND USE 2 SPRAYS IN EACH NOSTRIL DAILY 16 g 2    HYDROcodone-acetaminophen (NORCO) 7.5-325 MG per tablet Take 1 tablet by mouth daily as needed for Pain for up to 30 days.  30 tablet 0    vitamin D (ERGOCALCIFEROL) 1.25 MG (55689 UT) CAPS capsule TAKE 1 CAPSULE BY MOUTH 1 TIME WEEKLY 12 capsule 1    rosuvastatin (CRESTOR) 5' 6\" (1.676 m)   Wt 189 lb (85.7 kg)   BMI 30.51 kg/m²   Body mass index is 30.51 kg/m². General Appearance: well developed  and well nourished  Head/ Face: normocephalic and atraumatic  Vocal Quality: good/ normal  Ears: Right Ear: External: external ears normal Otoscopy Ear Canal: canal clear Otoscopy TM: TM's normal and TM's mobile Left Ear: External: external ears normal Otoscopy Ear Canal: canal clear Otoscopy TM: TM's normal and TM's mobile  Hearing: grossly intact  Nose: see endoscopy report  Neck: supple and adenopathy none palpable  Thyroid: normal   Heart demonstrated a regular rhythm and rate  Lungs demonstrated inspiratory wheezing that is more notable to the right compared to the left. This is appreciated both anteriorly and posterior. There were no evidence of carotid bruits to auscultation bilaterally. Assessment & Plan:    Problem List Items Addressed This Visit     Reactive airway disease with wheezing without complication     Chronic cough likely from reactive airway disease potentially related to COVID  Plan: I recommended a trial of Singulair for the reactive airway. I advised the patient that I will contact Dr. Hayley Caputo about considering a referral to pulmonology. Chronic cough - Primary    Relevant Orders    [facility] LARYNGOSCOPY FLEXIBLE DIAGNOSTIC (Completed)          Orders Placed This Encounter   Procedures    [facility] LARYNGOSCOPY FLEXIBLE DIAGNOSTIC     The nares were anesthetized with 4% lidocaine aerosol bilaterally. Each nare was individually evaluated where the patient was found to have a very minimal left inferior turbinate compared to the right which was incidental.  The left side was utilized for the full procedure. The posterior wall of the nasopharyngeal region demonstrated no abnormalities. The scope was advanced to the oropharyngeal region where the patient was found to have normal swallowing with no erythema or masses.   The scope was advanced to the epiglottis where the vocal cords were well visualized. The vocal cords were symmetrical and fully functional with no nodularities. There was no erythema or edema to indicate reflux disease. The tongue was extended and demonstrated no lesions to the surface. Overall the patient's laryngoscopy was unremarkable for any specific finding for the reason for the cough. Orders Placed This Encounter   Medications    montelukast (SINGULAIR) 10 MG tablet     Sig: Take 1 tablet by mouth daily     Dispense:  30 tablet     Refill:  2       Electronically signed by Colin Mckeon PA-C on 6/29/22 at 10:14 AM CDT        Please note that this chart was generated using dragon dictation software. Although every effort was made to ensure the accuracy of this automated transcription, some errors in transcription may have occurred.

## 2022-06-29 NOTE — ASSESSMENT & PLAN NOTE
Chronic cough likely from reactive airway disease potentially related to COVID  Plan: I recommended a trial of Singulair for the reactive airway. I advised the patient that I will contact Dr. Bro Chahal about considering a referral to pulmonology.

## 2022-06-30 ENCOUNTER — TELEPHONE (OUTPATIENT)
Dept: INTERNAL MEDICINE | Age: 62
End: 2022-06-30

## 2022-06-30 NOTE — TELEPHONE ENCOUNTER
----- Message from Britany Nascimento MD sent at 6/29/2022  6:29 PM CDT -----  Regarding: RE: Chronic cough  Please all patient  If cough continues we need to hear form her for possible pulmonology referral  ----- Message -----  From: Eugene Salas PA-C  Sent: 6/29/2022  12:26 PM CDT  To: Britany Nascimento MD  Subject: Chronic cough                                    Good afternoon! I saw Mrs. Linda Bone today for her cough. Laryngoscopy was negative with no obvious ENT findings. She did have inspiratory wheezing which I think may be a reactive. I placed her on Singular and told her that she may have to be referred to pulmonology if her symptoms persisted.    Thank you for the referral!  Gonzalez Huertas

## 2022-07-14 RX ORDER — ALBUTEROL SULFATE 90 UG/1
AEROSOL, METERED RESPIRATORY (INHALATION)
Qty: 8.5 G | Refills: 1 | Status: SHIPPED | OUTPATIENT
Start: 2022-07-14 | End: 2022-09-06

## 2022-08-10 ENCOUNTER — TELEPHONE (OUTPATIENT)
Dept: INTERNAL MEDICINE | Age: 62
End: 2022-08-10

## 2022-08-10 DIAGNOSIS — M25.561 CHRONIC PAIN OF BOTH KNEES: ICD-10-CM

## 2022-08-10 DIAGNOSIS — M25.562 CHRONIC PAIN OF BOTH KNEES: ICD-10-CM

## 2022-08-10 DIAGNOSIS — G89.29 CHRONIC PAIN OF BOTH KNEES: ICD-10-CM

## 2022-08-10 RX ORDER — HYDROCODONE BITARTRATE AND ACETAMINOPHEN 7.5; 325 MG/1; MG/1
1 TABLET ORAL DAILY PRN
Qty: 30 TABLET | Refills: 0 | Status: SHIPPED | OUTPATIENT
Start: 2022-08-10 | End: 2022-09-09

## 2022-08-10 NOTE — TELEPHONE ENCOUNTER
[facility] called requesting a refill of the below medication which has been pended for you:     Requested Prescriptions     Pending Prescriptions Disp Refills    HYDROcodone-acetaminophen (NORCO) 7.5-325 MG per tablet 30 tablet 0     Sig: Take 1 tablet by mouth daily as needed for Pain for up to 30 days.        Last Appointment Date: 6/13/2022  Next Appointment Date: 12/13/2022    Allergies   Allergen Reactions    Morphine And Related

## 2022-08-12 ENCOUNTER — HOSPITAL ENCOUNTER (OUTPATIENT)
Dept: GENERAL RADIOLOGY | Age: 62
Discharge: HOME | End: 2022-08-12
Payer: COMMERCIAL

## 2022-08-12 ENCOUNTER — OFFICE VISIT (OUTPATIENT)
Dept: INTERNAL MEDICINE | Age: 62
End: 2022-08-12
Payer: COMMERCIAL

## 2022-08-12 ENCOUNTER — TELEPHONE (OUTPATIENT)
Dept: INTERNAL MEDICINE | Age: 62
End: 2022-08-12

## 2022-08-12 VITALS
HEIGHT: 65 IN | SYSTOLIC BLOOD PRESSURE: 118 MMHG | WEIGHT: 185.4 LBS | HEART RATE: 70 BPM | BODY MASS INDEX: 30.89 KG/M2 | DIASTOLIC BLOOD PRESSURE: 76 MMHG | RESPIRATION RATE: 20 BRPM | OXYGEN SATURATION: 94 %

## 2022-08-12 DIAGNOSIS — R06.02 SHORTNESS OF BREATH: ICD-10-CM

## 2022-08-12 PROCEDURE — 99214 OFFICE O/P EST MOD 30 MIN: CPT | Performed by: INTERNAL MEDICINE

## 2022-08-12 PROCEDURE — 71046 X-RAY EXAM CHEST 2 VIEWS: CPT

## 2022-08-12 PROCEDURE — 71046 X-RAY EXAM CHEST 2 VIEWS: CPT | Performed by: RADIOLOGY

## 2022-08-12 RX ORDER — AMOXICILLIN AND CLAVULANATE POTASSIUM 875; 125 MG/1; MG/1
1 TABLET, FILM COATED ORAL 2 TIMES DAILY
Qty: 20 TABLET | Refills: 0 | Status: SHIPPED | OUTPATIENT
Start: 2022-08-12 | End: 2022-08-22

## 2022-08-12 RX ORDER — BENZONATATE 200 MG/1
200 CAPSULE ORAL 3 TIMES DAILY PRN
Qty: 30 CAPSULE | Refills: 0 | Status: SHIPPED | OUTPATIENT
Start: 2022-08-12 | End: 2022-08-19

## 2022-08-12 RX ORDER — METHYLPREDNISOLONE 4 MG/1
TABLET ORAL
Qty: 1 KIT | Refills: 0 | Status: SHIPPED | OUTPATIENT
Start: 2022-08-12 | End: 2022-08-18

## 2022-08-12 ASSESSMENT — ENCOUNTER SYMPTOMS
COUGH: 1
BACK PAIN: 0
TROUBLE SWALLOWING: 0
ABDOMINAL DISTENTION: 0
SORE THROAT: 0
VOMITING: 0
EYE ITCHING: 0
ABDOMINAL PAIN: 0
NAUSEA: 0
BLOOD IN STOOL: 0
WHEEZING: 1
SINUS PRESSURE: 0
SHORTNESS OF BREATH: 1
EYE DISCHARGE: 0

## 2022-08-12 NOTE — PROGRESS NOTES
mouth daily as needed for Pain for up to 30 days. 30 tablet 0    albuterol sulfate HFA (PROVENTIL;VENTOLIN;PROAIR) 108 (90 Base) MCG/ACT inhaler INHALE 2 PUFFS INTO THE LUNGS FOUR TIMES DAILY AS NEEDED FOR WHEEZING 8.5 g 1    Levocetirizine Dihydrochloride (XYZAL PO) Take by mouth      montelukast (SINGULAIR) 10 MG tablet Take 1 tablet by mouth daily 30 tablet 2    busPIRone (BUSPAR) 15 MG tablet TAKE 1 TABLET BY MOUTH TWICE DAILY AS NEEDED FOR ANXIETY 60 tablet 2    ibandronate (BONIVA) 150 MG tablet Take 1 tablet by mouth every 30 days Take one (1) tablet once per month in the morning with a full glass of water, on an empty stomach, and do not take anything else by mouth or lie down for the next 30 minutes. 3 tablet 3    fluticasone (FLONASE) 50 MCG/ACT nasal spray SHAKE LIQUID AND USE 2 SPRAYS IN EACH NOSTRIL DAILY 16 g 2    vitamin D (ERGOCALCIFEROL) 1.25 MG (84417 UT) CAPS capsule TAKE 1 CAPSULE BY MOUTH 1 TIME WEEKLY 12 capsule 1    rosuvastatin (CRESTOR) 10 MG tablet TAKE 1 TABLET BY MOUTH EVERY NIGHT AT BEDTIME 90 tablet 1    metoprolol succinate (TOPROL XL) 50 MG extended release tablet TAKE 1 TABLET BY MOUTH DAILY 90 tablet 1    FLUoxetine (PROZAC) 40 MG capsule TAKE ONE CAPSULE BY MOUTH DAILY 90 capsule 1    pantoprazole (PROTONIX) 40 MG tablet TAKE 1 TABLET BY MOUTH EVERY DAY 90 tablet 1    Cholecalciferol (VITAMIN D3) 2000 units CAPS Take by mouth      aspirin 81 MG chewable tablet Take 81 mg by mouth daily       No current facility-administered medications for this visit. Allergies   Allergen Reactions    Morphine And Related Headaches         Subjective:     Review of Systems   Constitutional:  Negative for activity change, appetite change, fatigue and fever. HENT:  Negative for congestion, hearing loss, sinus pressure, sore throat and trouble swallowing. Eyes:  Negative for discharge and itching. Respiratory:  Positive for cough, shortness of breath and wheezing.     Cardiovascular: Negative for chest pain, palpitations and leg swelling. Gastrointestinal:  Negative for abdominal distention, abdominal pain, blood in stool, nausea and vomiting. Endocrine: Negative for cold intolerance, heat intolerance and polydipsia. Genitourinary:  Negative for flank pain, frequency, hematuria and urgency. Musculoskeletal:  Negative for arthralgias, back pain and joint swelling. Skin:  Negative for rash and wound. Allergic/Immunologic: Negative for environmental allergies and food allergies. Neurological:  Negative for dizziness, tremors, syncope, weakness, numbness and headaches. Hematological:  Negative for adenopathy. Psychiatric/Behavioral:  Negative for agitation and hallucinations. The patient is not nervous/anxious. Objective:      /76 (Site: Left Upper Arm, Position: Sitting)   Pulse 70   Resp 20   Ht 5' 5\" (1.651 m)   Wt 185 lb 6.4 oz (84.1 kg)   SpO2 94%   BMI 30.85 kg/m²    Physical Exam  Constitutional:       General: She is not in acute distress. Appearance: She is well-developed. She is not diaphoretic. HENT:      Head: Normocephalic and atraumatic. Right Ear: External ear normal.      Left Ear: External ear normal.      Nose: Nose normal.      Mouth/Throat:      Pharynx: No oropharyngeal exudate. Eyes:      General: No scleral icterus. Right eye: No discharge. Left eye: No discharge. Conjunctiva/sclera: Conjunctivae normal.      Pupils: Pupils are equal, round, and reactive to light. Neck:      Thyroid: No thyromegaly. Vascular: No JVD. Trachea: No tracheal deviation. Cardiovascular:      Rate and Rhythm: Normal rate and regular rhythm. Heart sounds: Normal heart sounds. No murmur heard. No friction rub. No gallop. Pulmonary:      Effort: Pulmonary effort is normal. No respiratory distress. Breath sounds: Wheezing and rhonchi present. No rales.    Abdominal:      General: Bowel sounds are normal. There is no distension. Palpations: Abdomen is soft. There is no mass. Tenderness: There is no abdominal tenderness. There is no guarding or rebound. Musculoskeletal:         General: No tenderness or deformity. Normal range of motion. Cervical back: Normal range of motion and neck supple. Lymphadenopathy:      Cervical: No cervical adenopathy. Skin:     General: Skin is warm and dry. Coloration: Skin is not pale. Findings: No erythema or rash. Neurological:      Mental Status: She is alert and oriented to person, place, and time. Cranial Nerves: No cranial nerve deficit. Motor: No abnormal muscle tone. Coordination: Coordination normal.      Deep Tendon Reflexes: Reflexes are normal and symmetric. Reflexes normal.   Psychiatric:         Behavior: Behavior normal.         Thought Content: Thought content normal.         Judgment: Judgment normal.        Assessment:      Diagnosis Orders   1. Shortness of breath                 Plan:     Cough and shortness of breath and wheezing. I will repeat her chest x-ray today. She is coughing up purulent sputum and she sounds quite congested. .  We will set her up with a Medrol Dosepak push fluids start her on Augmentin 875 twice daily and Tessalon Perles 200 3 times daily. We will let her know the results of her chest x-ray and have her follow-up with Dr. Hetal Green in 10 days. No follow-ups on file. Patient given educational materials- see patient instructions. Discussed use, benefit, and side effects of prescribedmedications. All patient questions answered. Pt voiced understanding. Reviewedhealth maintenance. Instructed to continue current medications, diet and exercise. Patient agreed with treatment plan. **This report has been created usingvoice recognition software. It may contain minor errors which are inherent in voicerecognition technology. **    Electronically signed by Belinda Sheehan MD on 8/12/2022 at 10:33 AM

## 2022-08-24 ENCOUNTER — OFFICE VISIT (OUTPATIENT)
Dept: INTERNAL MEDICINE | Age: 62
End: 2022-08-24
Payer: COMMERCIAL

## 2022-08-24 VITALS
OXYGEN SATURATION: 96 % | BODY MASS INDEX: 30.66 KG/M2 | WEIGHT: 184 LBS | RESPIRATION RATE: 18 BRPM | HEIGHT: 65 IN | DIASTOLIC BLOOD PRESSURE: 78 MMHG | SYSTOLIC BLOOD PRESSURE: 128 MMHG | HEART RATE: 78 BPM

## 2022-08-24 DIAGNOSIS — U09.9 POST-COVID CHRONIC COUGH: Primary | ICD-10-CM

## 2022-08-24 DIAGNOSIS — R05.9 COUGH: ICD-10-CM

## 2022-08-24 DIAGNOSIS — R06.02 SHORTNESS OF BREATH: ICD-10-CM

## 2022-08-24 DIAGNOSIS — J68.3 REACTIVE AIRWAYS DYSFUNCTION SYNDROME (HCC): ICD-10-CM

## 2022-08-24 DIAGNOSIS — R05.3 POST-COVID CHRONIC COUGH: Primary | ICD-10-CM

## 2022-08-24 DIAGNOSIS — R91.8 INFILTRATE OF RIGHT LUNG PRESENT ON CHEST X-RAY: ICD-10-CM

## 2022-08-24 PROCEDURE — 99214 OFFICE O/P EST MOD 30 MIN: CPT | Performed by: INTERNAL MEDICINE

## 2022-08-24 RX ORDER — FLUTICASONE PROPIONATE AND SALMETEROL 250; 50 UG/1; UG/1
2 POWDER RESPIRATORY (INHALATION) EVERY 12 HOURS
Qty: 120 EACH | Refills: 3 | Status: SHIPPED | OUTPATIENT
Start: 2022-08-24 | End: 2022-10-03

## 2022-08-24 RX ORDER — PROMETHAZINE HYDROCHLORIDE AND CODEINE PHOSPHATE 6.25; 1 MG/5ML; MG/5ML
5 SYRUP ORAL 2 TIMES DAILY PRN
Qty: 100 ML | Refills: 0 | Status: SHIPPED | OUTPATIENT
Start: 2022-08-24 | End: 2022-09-07

## 2022-08-24 RX ORDER — FLUTICASONE PROPIONATE 50 MCG
SPRAY, SUSPENSION (ML) NASAL
Qty: 16 G | Refills: 2 | Status: SHIPPED | OUTPATIENT
Start: 2022-08-24

## 2022-08-24 ASSESSMENT — ENCOUNTER SYMPTOMS
SORE THROAT: 0
CONSTIPATION: 0
COUGH: 1
ABDOMINAL PAIN: 0
CHEST TIGHTNESS: 0
WHEEZING: 0

## 2022-08-24 NOTE — TELEPHONE ENCOUNTER
Ernestina [facility] called requesting a refill of the below medication which has been pended for you:     Requested Prescriptions     Pending Prescriptions Disp Refills    fluticasone (FLONASE) 50 MCG/ACT nasal spray [Pharmacy Med Name: FLUTICASONE 50MCG NASAL SP (120) RX] 16 g 2     Sig: SHAKE LIQUID AND USE 2 SPRAYS IN EACH NOSTRIL DAILY       Last Appointment Date: 6/13/2022  Next Appointment Date: 8/24/2022    Allergies   Allergen Reactions    Morphine And Related Headaches

## 2022-08-24 NOTE — PROGRESS NOTES
Chief Complaint   Patient presents with    Follow-up     10 day follow up for cough seem dr Prabhu Rojas on 08/12/2022 has taken amox [phone] and a zpak    Cough     History of presenting illness:  Leslie Rosales is a57 y.o. female who presents today for follow up on her chronic medical conditions as noted below. Silver Duque was seen today for follow-up and cough. Diagnoses and all orders for this visit:    Post-COVID chronic cough  -     promethazine-codeine (PHENERGAN WITH CODEINE) 6.25-10 MG/5ML syrup; Take 5 mLs by mouth 2 times daily as needed for Cough for up to 14 days. Shortness of breath  -     promethazine-codeine (PHENERGAN WITH CODEINE) 6.25-10 MG/5ML syrup; Take 5 mLs by mouth 2 times daily as needed for Cough for up to 14 days. -     CT CHEST W CONTRAST; Future    Cough  -     promethazine-codeine (PHENERGAN WITH CODEINE) 6.25-10 MG/5ML syrup; Take 5 mLs by mouth 2 times daily as needed for Cough for up to 14 days. -     CT CHEST W CONTRAST; Future    Reactive airways dysfunction syndrome (HCC)  -     promethazine-codeine (PHENERGAN WITH CODEINE) 6.25-10 MG/5ML syrup; Take 5 mLs by mouth 2 times daily as needed for Cough for up to 14 days. Infiltrate of right lung present on chest x-ray  -     promethazine-codeine (PHENERGAN WITH CODEINE) 6.25-10 MG/5ML syrup; Take 5 mLs by mouth 2 times daily as needed for Cough for up to 14 days. -     CT CHEST W CONTRAST; Future    Other orders  -     fluticasone-salmeterol (ADVAIR) 250-50 MCG/ACT AEPB diskus inhaler; Inhale 2 puffs into the lungs in the morning and 2 puffs in the evening.       Patient Active Problem List    Diagnosis Date Noted    Shortness of breath 08/12/2022     Priority: Medium    Reactive airway disease with wheezing without complication 95/43/6257     Priority: Medium    Chronic cough 06/29/2022     Priority: Medium    Chronic pain of both knees 08/01/2018    Facial skin lesion 08/01/2018    Right temporal headache 09/21/2017 Vision changes 09/21/2017    Pure hypercholesterolemia 09/10/2017    Vitamin D deficiency 09/10/2017    Essential hypertension 09/10/2017    Localized osteoporosis without current pathological fracture 09/10/2017     Overview Note:     2/14 Lspine -2.1/ hi neck -2.6; 1/2017 Lspine -1.8; hip neck -2.6  3/2021 hip neck -2.8; L spine -1.8      Rheumatoid arthritis involving both hands with positive rheumatoid factor ([facility] [address] 09/10/2017     Overview Note:     Dx 2011 RA/ CCP ne      Primary insomnia 09/10/2017    Generalized anxiety disorder 09/10/2017    Reactive depression 09/10/2017     Past Medical History:   Diagnosis Date    Cough     Depression     Generalized anxiety disorder     GERD (gastroesophageal reflux disease)     Headache     Hyperlipidemia     Hypertension     Insomnia     Osteoarthritis     Vitamin D deficiency       Past Surgical History:   Procedure Laterality Date    ADENOIDECTOMY      BREAST BIOPSY      BREAST BIOPSY      COLONOSCOPY      COLONOSCOPY N/A 02/10/2022    Dr Sherley Torres-Questionable lipoma-BCM, 5 yr recall    OVARY REMOVAL Right     TONSILLECTOMY       Current Outpatient Medications   Medication Sig Dispense Refill    promethazine-codeine (PHENERGAN WITH CODEINE) 6.25-10 MG/5ML syrup Take 5 mLs by mouth 2 times daily as needed for Cough for up to 14 days. 100 mL 0    fluticasone-salmeterol (ADVAIR) 250-50 MCG/ACT AEPB diskus inhaler Inhale 2 puffs into the lungs in the morning and 2 puffs in the evening. 120 each 3    HYDROcodone-acetaminophen (NORCO) 7.5-325 MG per tablet Take 1 tablet by mouth daily as needed for Pain for up to 30 days.  30 tablet 0    albuterol sulfate HFA (PROVENTIL;VENTOLIN;PROAIR) 108 (90 Base) MCG/ACT inhaler INHALE 2 PUFFS INTO THE LUNGS FOUR TIMES DAILY AS NEEDED FOR WHEEZING 8.5 g 1    Levocetirizine Dihydrochloride (XYZAL PO) Take by mouth      montelukast (SINGULAIR) 10 MG tablet Take 1 tablet by mouth daily 30 tablet 2    busPIRone (BUSPAR) 15 MG tablet TAKE 1 TABLET BY MOUTH TWICE DAILY AS NEEDED FOR ANXIETY 60 tablet 2    ibandronate (BONIVA) 150 MG tablet Take 1 tablet by mouth every 30 days Take one (1) tablet once per month in the morning with a full glass of water, on an empty stomach, and do not take anything else by mouth or lie down for the next 30 minutes. 3 tablet 3    fluticasone (FLONASE) 50 MCG/ACT nasal spray SHAKE LIQUID AND USE 2 SPRAYS IN EACH NOSTRIL DAILY 16 g 2    vitamin D (ERGOCALCIFEROL) 1.25 MG (04780 UT) CAPS capsule TAKE 1 CAPSULE BY MOUTH 1 TIME WEEKLY 12 capsule 1    rosuvastatin (CRESTOR) 10 MG tablet TAKE 1 TABLET BY MOUTH EVERY NIGHT AT BEDTIME 90 tablet 1    metoprolol succinate (TOPROL XL) 50 MG extended release tablet TAKE 1 TABLET BY MOUTH DAILY 90 tablet 1    FLUoxetine (PROZAC) 40 MG capsule TAKE ONE CAPSULE BY MOUTH DAILY 90 capsule 1    pantoprazole (PROTONIX) 40 MG tablet TAKE 1 TABLET BY MOUTH EVERY DAY 90 tablet 1    Cholecalciferol (VITAMIN D3) 2000 units CAPS Take by mouth      aspirin 81 MG chewable tablet Take 81 mg by mouth daily       No current facility-administered medications for this visit. Allergies   Allergen Reactions    Morphine And Related Headaches     Social History     Tobacco Use    Smoking status: Never    Smokeless tobacco: Never   Substance Use Topics    Alcohol use: Yes      Family History   Problem Relation Age of Onset    Lung Cancer Father     Colon Cancer Sister 62        COLON    Other Sister         PANCREATIC CANCER    Diabetes Brother     Coronary Art Dis Brother        Review of Systems   Constitutional:  Positive for fatigue. Negative for chills and fever. HENT:  Positive for congestion. Negative for ear pain, nosebleeds, postnasal drip and sore throat. Respiratory:  Positive for cough. Negative for chest tightness and wheezing. Cardiovascular:  Negative for chest pain, palpitations and leg swelling. Gastrointestinal:  Negative for abdominal pain and constipation.    Genitourinary: Negative for dysuria and urgency. Musculoskeletal: Negative. Negative for arthralgias. Skin:  Negative for rash. Neurological:  Negative for dizziness and headaches. Psychiatric/Behavioral: Negative. Vitals:    08/24/22 0808   BP: 128/78   Site: Left Upper Arm   Position: Sitting   Cuff Size: Large Adult   Pulse: 78   Resp: 18   SpO2: 96%   Weight: 184 lb (83.5 kg)   Height: 5' 5\" (1.651 m)     Body mass index is 30.62 kg/m². Physical Exam  Constitutional:       Appearance: She is well-developed. HENT:      Right Ear: External ear normal.      Left Ear: External ear normal.      Mouth/Throat:      Pharynx: No oropharyngeal exudate. Eyes:      Conjunctiva/sclera: Conjunctivae normal.      Pupils: Pupils are equal, round, and reactive to light. Neck:      Thyroid: No thyromegaly. Vascular: No JVD. Cardiovascular:      Rate and Rhythm: Normal rate. Heart sounds: Normal heart sounds. No murmur heard. Pulmonary:      Effort: No respiratory distress. Breath sounds: Wheezing and rales present. Chest:      Chest wall: No tenderness. Abdominal:      General: Bowel sounds are normal.      Palpations: Abdomen is soft. Musculoskeletal:      Cervical back: Neck supple. Lymphadenopathy:      Cervical: No cervical adenopathy. Skin:     Findings: No rash. Lab Review   No visits with results within 2 Month(s) from this visit.    Latest known visit with results is:   Orders Only on 06/13/2022   Component Date Value    TSH 06/13/2022 1.640     Vit D, 25-Hydroxy 06/13/2022 43.8     Cholesterol, Total 06/13/2022 189     Triglycerides 06/13/2022 90     HDL 06/13/2022 70     LDL Calculated 06/13/2022 101     Sodium 06/13/2022 142     Potassium 06/13/2022 4.0     Chloride 06/13/2022 106     CO2 06/13/2022 24     Anion Gap 06/13/2022 12     Glucose 06/13/2022 88     BUN 06/13/2022 14     Creatinine 06/13/2022 0.8     GFR Non- 06/13/2022 >60     GFR  06/13/2022 >59     Calcium 06/13/2022 9.3     Total Protein 06/13/2022 6.9     Albumin 06/13/2022 4.1     Total Bilirubin 06/13/2022 0.8     Alkaline Phosphatase 06/13/2022 105 (A)    ALT 06/13/2022 20     AST 06/13/2022 20            ASSESSMENT/PLAN:    Post-COVID chronic cough  Shortness of breath- better compared to 2 weeks ago  Infiltrate of right lung present on chest x-ray  Impression   Possible patchy infiltrate in the anterior right cardiophrenic angle. Recommendation:    Follow up recommended. Electronically Signed by Shaniqua Chung MD at 13-Aug-2022 03:29:21 AM                  Cough    Reactive airways dysfunction syndrome [facility])    Patient had COVID about 4 months ago  Coughing since then  Chest x-ray that was obtained by Dr. Sprague Linker 2 weeks ago showed possible patchy infiltrate right cardiophrenic angle  States that she is better, cough is better and shortness of breath has improved post Medrol Dosepak and amoxicillin/clavulanic acid course but she still continues to cough    Wheezing on examination today right basal area  Recommend following  We will proceed with CT chest  Recommend Advair inhaler 2 puffs twice daily for 2 weeks, then decrease to 1 puff twice daily, use it before brushing teeth twice daily  Further plans once I review CT results  If cough not resolving patient understand that she needs to call us back and we may need to proceed with pulmonology referral/possible PFT    RX    -     promethazine-codeine (PHENERGAN WITH CODEINE) 6.25-10 MG/5ML syrup; Take 5 mLs by mouth 2 times daily as needed for Cough for up to 14 days. -     CT CHEST W CONTRAST; Future  -     fluticasone-salmeterol (ADVAIR) 250-50 MCG/ACT AEPB diskus inhaler; Inhale 2 puffs into the lungs in the morning and 2 puffs in the evening.             Orders Placed This Encounter   Procedures    CT CHEST W CONTRAST     New Prescriptions    FLUTICASONE-SALMETEROL (ADVAIR) 250-50 MCG/ACT AEPB DISKUS INHALER    Inhale 2 puffs into the lungs in the morning and 2 puffs in the evening. PROMETHAZINE-CODEINE (PHENERGAN WITH CODEINE) 6.25-10 MG/5ML SYRUP    Take 5 mLs by mouth 2 times daily as needed for Cough for up to 14 days. No follow-ups on file. There are no Patient Instructions on file for this visit. EMR Dragon/transcription disclaimer:Significant part of this  encounter note is electronic transcription/translationof spoken language to printed text. The electronic translation of spoken language may be erroneous, or at times, nonsensical words or phrases may be inadvertently transcribed.  Although I have reviewed the note for sucherrors, some may still exist.

## 2022-08-30 ENCOUNTER — HOSPITAL ENCOUNTER (OUTPATIENT)
Dept: CT IMAGING | Age: 62
Discharge: HOME | End: 2022-08-30
Payer: COMMERCIAL

## 2022-08-30 DIAGNOSIS — R05.9 COUGH: ICD-10-CM

## 2022-08-30 DIAGNOSIS — R91.8 INFILTRATE OF RIGHT LUNG PRESENT ON CHEST X-RAY: ICD-10-CM

## 2022-08-30 DIAGNOSIS — R06.02 SHORTNESS OF BREATH: ICD-10-CM

## 2022-08-30 PROCEDURE — 71260 CT THORAX DX C+: CPT

## 2022-08-30 PROCEDURE — 71260 CT THORAX DX C+: CPT | Performed by: RADIOLOGY

## 2022-08-30 PROCEDURE — 6360000004 HC RX CONTRAST MEDICATION: Performed by: INTERNAL MEDICINE

## 2022-08-30 RX ADMIN — IOPAMIDOL 60 ML: 755 INJECTION, SOLUTION INTRAVENOUS at 09:36

## 2022-09-01 ENCOUNTER — TELEPHONE (OUTPATIENT)
Dept: INTERNAL MEDICINE | Age: 62
End: 2022-09-01

## 2022-09-01 DIAGNOSIS — U09.9 POST-COVID CHRONIC COUGH: ICD-10-CM

## 2022-09-01 DIAGNOSIS — E04.1 THYROID NODULE: Primary | ICD-10-CM

## 2022-09-01 DIAGNOSIS — N28.1 RENAL CYST: ICD-10-CM

## 2022-09-01 DIAGNOSIS — R05.3 POST-COVID CHRONIC COUGH: ICD-10-CM

## 2022-09-01 DIAGNOSIS — R06.02 SHORTNESS OF BREATH: ICD-10-CM

## 2022-09-01 NOTE — TELEPHONE ENCOUNTER
Patient called johann if her ct results had come back yet? Would like if someone could call her if they are?     Please call and advise

## 2022-09-01 NOTE — TELEPHONE ENCOUNTER
----- Message from Devang Box MD sent at 8/30/2022  5:38 PM CDT -----  1. Mild patchy fibrosis noted in medial segment of right lower lobe  Since patient is continuing to cough would suggest appointment with pulmonology for evaluation  2. Partial visualization of mild fullness of the left upper renal collecting systems or parapelvic cysts - renal US needed  3. Bilateral nonenhancing hypodense nodules in both lobes of thyroid with calcification in left lobe.   Could correlate with thyroid ultrasound- thryoid us needed

## 2022-09-06 RX ORDER — ALBUTEROL SULFATE 90 UG/1
AEROSOL, METERED RESPIRATORY (INHALATION)
Qty: 8.5 G | Refills: 1 | Status: SHIPPED | OUTPATIENT
Start: 2022-09-06

## 2022-09-06 NOTE — TELEPHONE ENCOUNTER
Rekha Carroll called requesting a refill of the below medication which has been pended for you:     Requested Prescriptions     Pending Prescriptions Disp Refills    albuterol sulfate HFA (PROVENTIL;VENTOLIN;PROAIR) 108 (90 Base) MCG/ACT inhaler [Pharmacy Med Name: ALBUTEROL HFA INH (200 PUFFS)8.5GM] 8.5 g 1     Sig: INHALE 2 PUFFS INTO THE LUNGS FOUR TIMES DAILY AS NEEDED FOR WHEEZING       Last Appointment Date: 8/24/2022  Next Appointment Date: 12/13/2022    Allergies   Allergen Reactions    Morphine And Related Headaches

## 2022-09-07 ENCOUNTER — HOSPITAL ENCOUNTER (OUTPATIENT)
Dept: ULTRASOUND IMAGING | Age: 62
Discharge: HOME | End: 2022-09-07
Payer: COMMERCIAL

## 2022-09-07 DIAGNOSIS — N28.1 RENAL CYST: ICD-10-CM

## 2022-09-07 DIAGNOSIS — E04.1 THYROID NODULE: ICD-10-CM

## 2022-09-07 PROCEDURE — 76770 US EXAM ABDO BACK WALL COMP: CPT | Performed by: RADIOLOGY

## 2022-09-07 PROCEDURE — 76536 US EXAM OF HEAD AND NECK: CPT

## 2022-09-07 PROCEDURE — 76536 US EXAM OF HEAD AND NECK: CPT | Performed by: RADIOLOGY

## 2022-09-07 PROCEDURE — 76770 US EXAM ABDO BACK WALL COMP: CPT

## 2022-09-09 ENCOUNTER — TELEPHONE (OUTPATIENT)
Dept: INTERNAL MEDICINE | Age: 62
End: 2022-09-09

## 2022-09-09 DIAGNOSIS — E04.1 THYROID NODULE: Primary | ICD-10-CM

## 2022-09-14 RX ORDER — BUSPIRONE HYDROCHLORIDE 15 MG/1
TABLET ORAL
Qty: 60 TABLET | Refills: 2 | Status: SHIPPED | OUTPATIENT
Start: 2022-09-14

## 2022-10-03 ENCOUNTER — HOSPITAL ENCOUNTER (OUTPATIENT)
Dept: GENERAL RADIOLOGY | Age: 62
Discharge: HOME | End: 2022-10-03
Payer: COMMERCIAL

## 2022-10-03 ENCOUNTER — OFFICE VISIT (OUTPATIENT)
Dept: PULMONOLOGY | Age: 62
End: 2022-10-03
Payer: COMMERCIAL

## 2022-10-03 VITALS
HEART RATE: 84 BPM | TEMPERATURE: 97.2 F | DIASTOLIC BLOOD PRESSURE: 78 MMHG | SYSTOLIC BLOOD PRESSURE: 129 MMHG | BODY MASS INDEX: 29.99 KG/M2 | OXYGEN SATURATION: 94 % | HEIGHT: 65 IN | WEIGHT: 180 LBS

## 2022-10-03 DIAGNOSIS — K21.9 GASTROESOPHAGEAL REFLUX DISEASE WITHOUT ESOPHAGITIS: ICD-10-CM

## 2022-10-03 DIAGNOSIS — U09.9 LONG COVID: ICD-10-CM

## 2022-10-03 DIAGNOSIS — R06.2 WHEEZING: ICD-10-CM

## 2022-10-03 DIAGNOSIS — Z11.52 ENCOUNTER FOR SCREENING FOR COVID-19: Primary | ICD-10-CM

## 2022-10-03 DIAGNOSIS — R05.3 CHRONIC COUGH: Primary | ICD-10-CM

## 2022-10-03 DIAGNOSIS — J45.50 SEVERE PERSISTENT REACTIVE AIRWAY DISEASE WITHOUT COMPLICATION: ICD-10-CM

## 2022-10-03 DIAGNOSIS — K44.9 HIATAL HERNIA: ICD-10-CM

## 2022-10-03 LAB
BASOPHILS ABSOLUTE: 0.1 K/UL (ref 0–0.2)
BASOPHILS RELATIVE PERCENT: 0.9 % (ref 0–1)
EOSINOPHILS ABSOLUTE: 0.8 K/UL (ref 0–0.6)
EOSINOPHILS RELATIVE PERCENT: 7.8 % (ref 0–5)
HCT VFR BLD CALC: 44.3 % (ref 37–47)
HEMOGLOBIN: 13.9 G/DL (ref 12–16)
IGG: 1327 MG/DL (ref 700–1600)
IGM: 131 MG/DL (ref 40–230)
IMMATURE GRANULOCYTES #: 0 K/UL
LYMPHOCYTES ABSOLUTE: 2 K/UL (ref 1.1–4.5)
LYMPHOCYTES RELATIVE PERCENT: 18.7 % (ref 20–40)
MCH RBC QN AUTO: 27.6 PG (ref 27–31)
MCHC RBC AUTO-ENTMCNC: 31.4 G/DL (ref 33–37)
MCV RBC AUTO: 88.1 FL (ref 81–99)
MONOCYTES ABSOLUTE: 0.8 K/UL (ref 0–0.9)
MONOCYTES RELATIVE PERCENT: 7.4 % (ref 0–10)
NEUTROPHILS ABSOLUTE: 6.9 K/UL (ref 1.5–7.5)
NEUTROPHILS RELATIVE PERCENT: 64.9 % (ref 50–65)
PDW BLD-RTO: 15 % (ref 11.5–14.5)
PLATELET # BLD: 251 K/UL (ref 130–400)
PMV BLD AUTO: 9.2 FL (ref 9.4–12.3)
RBC # BLD: 5.03 M/UL (ref 4.2–5.4)
WBC # BLD: 10.6 K/UL (ref 4.8–10.8)

## 2022-10-03 PROCEDURE — 71046 X-RAY EXAM CHEST 2 VIEWS: CPT

## 2022-10-03 PROCEDURE — 99204 OFFICE O/P NEW MOD 45 MIN: CPT | Performed by: INTERNAL MEDICINE

## 2022-10-03 RX ORDER — FLUTICASONE PROPIONATE AND SALMETEROL 500; 50 UG/1; UG/1
1 POWDER RESPIRATORY (INHALATION) EVERY 12 HOURS
Qty: 60 EACH | Refills: 3 | Status: SHIPPED | OUTPATIENT
Start: 2022-10-03

## 2022-10-03 RX ORDER — MONTELUKAST SODIUM 10 MG/1
10 TABLET ORAL DAILY
Qty: 30 TABLET | Refills: 2 | OUTPATIENT
Start: 2022-10-03

## 2022-10-03 RX ORDER — PREDNISONE 10 MG/1
TABLET ORAL
Qty: 35 TABLET | Refills: 0 | Status: SHIPPED | OUTPATIENT
Start: 2022-10-03 | End: 2022-10-26 | Stop reason: ALTCHOICE

## 2022-10-03 NOTE — PROGRESS NOTES
Pulmonary and Sleep Medicine    Casimiro Arguello (:  1960) is a 60 y.o. female,New patient, here for evaluation of the following chief complaint(s):  New Patient (Referred by Dr Bo Castle SOB, Post Covid Cough)      Referring physician:  Bo Castle Md  [address] [place] [address]   [address]  [facility] [address]     ASSESSMENT/PLAN:  1. Chronic cough  -     Full PFT Study With Bronchodilator; Future  2. Gastroesophageal reflux disease without esophagitis  3. Hiatal hernia  4. Long COVID  -     predniSONE (DELTASONE) 10 MG tablet; 40 mg a day and taper by 10 mg every third day to off, Disp-35 tablet, R-0Normal  -     CBC with Auto Differential; Future  -     IgM; Future  -     IgE; Future  -     IgG; Future  -     XR CHEST (2 VW); Future  5. Severe persistent reactive airway disease without complication  -     fluticasone-salmeterol (ADVAIR DISKUS) 500-50 MCG/ACT AEPB diskus inhaler; Inhale 1 puff into the lungs in the morning and 1 puff in the evening., Disp-60 each, R-3Normal  -     Full PFT Study With Bronchodilator; Future  6. Wheezing  -     predniSONE (DELTASONE) 10 MG tablet; 40 mg a day and taper by 10 mg every third day to off, Disp-35 tablet, R-0Normal      She seems to have reactive airway disorder secondary to COVID. We will continue with the Advair. Due to the fact that she improved with steroids we will consider increasing the Advair to 550. We will also start her on steroid taper. Will obtain CBC and IgE levels today. Pulmonary function testing as outpatient. She does seem to have reflux on the CT of the chest done in August with hiatal hernia however she denies any reflux symptoms she is taking Protonix daily in the evening. Allen Hardy MD, [facility], DAB    Return in about 4 weeks (around 10/31/2022). SUBJECTIVE/OBJECTIVE:  The patient is here for evaluation of a chronic cough. She developed COVID in March of this year. Since then she has been having a cough.   Her cough is mostly nonproductive. The cough disrupts her sleep. She uses Advair inhaler she does not feel the Advair or the albuterol helped her significantly. She was given prednisone taper and she felt it helped some but then her symptoms worsened after the taper was done. She does use Protonix. She had been on Protonix prior to her COVID. She works as a manager at [employer]. She does not feel that staying away from work makes a difference in her symptoms. No specific exacerbating factors. No specific remitting factors. Denies ever smoking. She does not live with a smoker. Prior to Visit Medications    Medication Sig Taking? Authorizing Provider   busPIRone (BUSPAR) 15 MG tablet TAKE 1 TABLET BY MOUTH TWICE DAILY AS NEEDED FOR ANXIETY Yes Eric Owusu MD   albuterol sulfate HFA (PROVENTIL;VENTOLIN;PROAIR) 108 (90 Base) MCG/ACT inhaler INHALE 2 PUFFS INTO THE LUNGS FOUR TIMES DAILY AS NEEDED FOR WHEEZING Yes Eric Owusu MD   fluticasone (FLONASE) 50 MCG/ACT nasal spray SHAKE LIQUID AND USE 2 SPRAYS IN EACH NOSTRIL DAILY Yes Eric Owusu MD   fluticasone-salmeterol (ADVAIR) 250-50 MCG/ACT AEPB diskus inhaler Inhale 2 puffs into the lungs in the morning and 2 puffs in the evening. Yes Eric Owusu MD   Levocetirizine Dihydrochloride (XYZAL PO) Take by mouth Yes Historical Provider, MD   montelukast (SINGULAIR) 10 MG tablet Take 1 tablet by mouth daily Yes Vaishnavi Martinez PA-C   ibandronate (BONIVA) 150 MG tablet Take 1 tablet by mouth every 30 days Take one (1) tablet once per month in the morning with a full glass of water, on an empty stomach, and do not take anything else by mouth or lie down for the next 30 minutes.  Yes Eric Owusu MD   vitamin D (ERGOCALCIFEROL) 1.25 MG (77214 UT) CAPS capsule TAKE 1 CAPSULE BY MOUTH 1 TIME WEEKLY Yes Eric Owusu MD   rosuvastatin (CRESTOR) 10 MG tablet TAKE 1 TABLET BY MOUTH EVERY NIGHT AT BEDTIME Yes Eric Owusu MD   metoprolol succinate (TOPROL XL) 50 MG extended release tablet TAKE 1 TABLET BY MOUTH DAILY Yes Lida Lebron MD   FLUoxetine (PROZAC) 40 MG capsule TAKE ONE CAPSULE BY MOUTH DAILY Yes Lida Lebron MD   pantoprazole (PROTONIX) 40 MG tablet TAKE 1 TABLET BY MOUTH EVERY DAY Yes Lida Lebron MD   rosuvastatin (CRESTOR) 10 MG tablet TAKE 1 TABLET BY MOUTH EVERY NIGHT Yes Lida Lebron MD   Cholecalciferol (VITAMIN D3) 2000 units CAPS Take by mouth Yes Historical Provider, MD   aspirin 81 MG chewable tablet Take 81 mg by mouth daily Yes Historical Provider, MD        Review of Systems    Vitals:    10/03/22 0858   BP: 129/78   Pulse: 84   Temp: 97.2 °F (36.2 °C)   SpO2: 94%     BMI Readings from Last 1 Encounters:   10/03/22 29.95 kg/m²         Physical Exam        This note was generated used a voice recognition software. Errors in voice recognition may have occurred. An electronic signature was used to authenticate this note.     --Darnell Cook MD

## 2022-10-05 ENCOUNTER — OFFICE VISIT (OUTPATIENT)
Dept: ENT CLINIC | Age: 62
End: 2022-10-05
Payer: COMMERCIAL

## 2022-10-05 VITALS
HEIGHT: 65 IN | SYSTOLIC BLOOD PRESSURE: 126 MMHG | DIASTOLIC BLOOD PRESSURE: 78 MMHG | WEIGHT: 180 LBS | BODY MASS INDEX: 29.99 KG/M2

## 2022-10-05 DIAGNOSIS — E04.2 MULTINODULAR GOITER (NONTOXIC): Primary | ICD-10-CM

## 2022-10-05 LAB — IGE: 2844 KU/L

## 2022-10-05 PROCEDURE — 99204 OFFICE O/P NEW MOD 45 MIN: CPT | Performed by: OTOLARYNGOLOGY

## 2022-10-05 ASSESSMENT — ENCOUNTER SYMPTOMS
EYES NEGATIVE: 1
GASTROINTESTINAL NEGATIVE: 1
RESPIRATORY NEGATIVE: 1
ALLERGIC/IMMUNOLOGIC NEGATIVE: 1

## 2022-10-05 NOTE — PROGRESS NOTES
10/5/2022    Reynaldo Perry (:  1960) is a 60 y.o. female, Established patient, here for evaluation of the following chief complaint(s):  New Patient (Thyroid nodule)      Vitals:    10/05/22 1616   BP: 126/78   Weight: 180 lb (81.6 kg)   Height: 5' 5\" (1.651 m)       Wt Readings from Last 3 Encounters:   10/05/22 180 lb (81.6 kg)   10/03/22 180 lb (81.6 kg)   22 184 lb (83.5 kg)       BP Readings from Last 3 Encounters:   10/05/22 126/78   10/03/22 129/78   22 128/78         SUBJECTIVE/OBJECTIVE:    New patient seen today for her thyroid. She said a thyroid nodule was found incidentally while she is being worked up for a post COVID cough. A chest CT demonstrated thyroid nodules and she had a dedicated ultrasound. I reviewed this ultrasound and there is a nodule on the left that is considered moderate grade and requiring FNA. Patient denies any symptoms associated with her thyroid. Review of Systems   Constitutional: Negative. HENT: Negative. Eyes: Negative. Respiratory: Negative. Cardiovascular: Negative. Gastrointestinal: Negative. Endocrine: Negative. Musculoskeletal: Negative. Skin: Negative. Allergic/Immunologic: Negative. Neurological: Negative. Hematological: Negative. Psychiatric/Behavioral: Negative. Physical Exam  Vitals reviewed. Constitutional:       Appearance: Normal appearance. She is normal weight. HENT:      Head: Normocephalic and atraumatic. Right Ear: Tympanic membrane, ear canal and external ear normal.      Left Ear: Tympanic membrane, ear canal and external ear normal.      Nose: Nose normal.      Mouth/Throat:      Mouth: Mucous membranes are moist.      Pharynx: Oropharynx is clear. Eyes:      Extraocular Movements: Extraocular movements intact. Pupils: Pupils are equal, round, and reactive to light. Cardiovascular:      Rate and Rhythm: Normal rate and regular rhythm.    Pulmonary:      Effort: Pulmonary effort is normal.      Breath sounds: Normal breath sounds. Musculoskeletal:      Cervical back: Normal range of motion. Skin:     General: Skin is warm and dry. Neurological:      General: No focal deficit present. Mental Status: She is alert and oriented to person, place, and time. Psychiatric:         Mood and Affect: Mood normal.         Behavior: Behavior normal.            ASSESSMENT/PLAN:    1. Multinodular goiter (nontoxic)  -     US BIOPSY THYROID; Future  Ultrasound-guided FNA ordered. I will see her back in 3 weeks talk about results and then where we go from there. Return in about 3 weeks (around 10/26/2022). An electronic signature was used to authenticate this note. Amber Taylor MD       Please note that this chart was generated using dragon dictation software. Although every effort was made to ensure the accuracy of this automated transcription, some errors in transcription may have occurred.

## 2022-10-06 DIAGNOSIS — Z11.52 ENCOUNTER FOR SCREENING FOR COVID-19: ICD-10-CM

## 2022-10-06 LAB — SARS-COV-2, PCR: NOT DETECTED

## 2022-10-07 ENCOUNTER — HOSPITAL ENCOUNTER (OUTPATIENT)
Dept: PULMONOLOGY | Age: 62
Discharge: HOME | End: 2022-10-07
Payer: COMMERCIAL

## 2022-10-07 VITALS — OXYGEN SATURATION: 91 % | HEIGHT: 65 IN | BODY MASS INDEX: 29.99 KG/M2 | WEIGHT: 180 LBS

## 2022-10-07 DIAGNOSIS — R05.3 CHRONIC COUGH: ICD-10-CM

## 2022-10-07 DIAGNOSIS — J45.50 SEVERE PERSISTENT REACTIVE AIRWAY DISEASE WITHOUT COMPLICATION: ICD-10-CM

## 2022-10-07 PROCEDURE — 94729 DIFFUSING CAPACITY: CPT

## 2022-10-07 PROCEDURE — 94726 PLETHYSMOGRAPHY LUNG VOLUMES: CPT

## 2022-10-07 PROCEDURE — 6360000002 HC RX W HCPCS: Performed by: INTERNAL MEDICINE

## 2022-10-07 PROCEDURE — 94060 EVALUATION OF WHEEZING: CPT

## 2022-10-07 RX ORDER — ALBUTEROL SULFATE 2.5 MG/3ML
2.5 SOLUTION RESPIRATORY (INHALATION) EVERY 6 HOURS PRN
Status: DISCONTINUED | OUTPATIENT
Start: 2022-10-07 | End: 2022-10-09 | Stop reason: HOSPADM

## 2022-10-07 RX ADMIN — ALBUTEROL SULFATE 2.5 MG: 2.5 SOLUTION RESPIRATORY (INHALATION) at 09:39

## 2022-10-07 NOTE — PROCEDURES
Media Information  Document Information  Pulmonary Function Study    Interpretation:    The FVC is mildly reduced. FEV1 is moderately reduced. FEV1/FVC ratio is reduced. After bronchodilator therapy there was a significant improvement in FEV1. Total lung capacity is Normal. Residual volume is increased. Diffusing lung capacity when corrected for alveolar volume is Normal.         Impression:    Moderate chronic obstructive pulmonary disease. Hyperinflation noted.          Gabriella Aguirre MD, [facility], [facility]

## 2022-10-19 ENCOUNTER — HOSPITAL ENCOUNTER (OUTPATIENT)
Dept: ULTRASOUND IMAGING | Age: 62
Discharge: HOME | End: 2022-10-19
Payer: COMMERCIAL

## 2022-10-19 DIAGNOSIS — E04.2 MULTINODULAR GOITER (NONTOXIC): ICD-10-CM

## 2022-10-19 PROCEDURE — C1729 CATH, DRAINAGE: HCPCS

## 2022-10-19 PROCEDURE — 88173 CYTOPATH EVAL FNA REPORT: CPT

## 2022-10-19 PROCEDURE — 88305 TISSUE EXAM BY PATHOLOGIST: CPT

## 2022-10-26 ENCOUNTER — OFFICE VISIT (OUTPATIENT)
Dept: ENT CLINIC | Age: 62
End: 2022-10-26
Payer: COMMERCIAL

## 2022-10-26 VITALS
DIASTOLIC BLOOD PRESSURE: 86 MMHG | HEIGHT: 65 IN | SYSTOLIC BLOOD PRESSURE: 122 MMHG | BODY MASS INDEX: 29.99 KG/M2 | WEIGHT: 180 LBS

## 2022-10-26 DIAGNOSIS — E04.2 MULTINODULAR GOITER (NONTOXIC): ICD-10-CM

## 2022-10-26 DIAGNOSIS — E04.2 MULTINODULAR GOITER (NONTOXIC): Primary | ICD-10-CM

## 2022-10-26 LAB — T4 FREE: 1.44 NG/DL (ref 0.93–1.7)

## 2022-10-26 PROCEDURE — 3074F SYST BP LT 130 MM HG: CPT | Performed by: OTOLARYNGOLOGY

## 2022-10-26 PROCEDURE — 3078F DIAST BP <80 MM HG: CPT | Performed by: OTOLARYNGOLOGY

## 2022-10-26 PROCEDURE — 99213 OFFICE O/P EST LOW 20 MIN: CPT | Performed by: OTOLARYNGOLOGY

## 2022-10-26 ASSESSMENT — ENCOUNTER SYMPTOMS
EYES NEGATIVE: 1
ALLERGIC/IMMUNOLOGIC NEGATIVE: 1
GASTROINTESTINAL NEGATIVE: 1
RESPIRATORY NEGATIVE: 1

## 2022-10-26 NOTE — PROGRESS NOTES
10/26/2022    Rola Naranjo (:  1960) is a 60 y.o. female, Established patient, here for evaluation of the following chief complaint(s):  Follow-up (Thyroid)      Vitals:    10/26/22 1609   BP: 122/86   Weight: 180 lb (81.6 kg)   Height: 5' 5\" (1.651 m)       Wt Readings from Last 3 Encounters:   10/26/22 180 lb (81.6 kg)   10/07/22 180 lb (81.6 kg)   10/05/22 180 lb (81.6 kg)       BP Readings from Last 3 Encounters:   10/26/22 122/86   10/05/22 126/78   10/03/22 129/78         SUBJECTIVE/OBJECTIVE:    Patient seen today for her thyroid. Her thyroid nodules were found incidentally on a CT scan and radiology recommended FNA. The FNA was inconclusive. The patient says she has been chronically tired for quite some time. I reviewed her labs initial TSH which has been historically normal.      Review of Systems   Constitutional: Negative. HENT: Negative. Eyes: Negative. Respiratory: Negative. Cardiovascular: Negative. Gastrointestinal: Negative. Endocrine: Negative. Musculoskeletal: Negative. Skin: Negative. Allergic/Immunologic: Negative. Neurological: Negative. Hematological: Negative. Psychiatric/Behavioral: Negative. Physical Exam  Vitals reviewed. Constitutional:       Appearance: Normal appearance. She is normal weight. HENT:      Head: Normocephalic and atraumatic. Right Ear: Tympanic membrane, ear canal and external ear normal.      Left Ear: Tympanic membrane, ear canal and external ear normal.      Nose: Nose normal.      Mouth/Throat:      Mouth: Mucous membranes are moist.      Pharynx: Oropharynx is clear. Eyes:      Extraocular Movements: Extraocular movements intact. Pupils: Pupils are equal, round, and reactive to light. Cardiovascular:      Rate and Rhythm: Normal rate and regular rhythm. Pulmonary:      Effort: Pulmonary effort is normal.      Breath sounds: Normal breath sounds.    Musculoskeletal:      Cervical back: Normal range of motion. Skin:     General: Skin is warm and dry. Neurological:      General: No focal deficit present. Mental Status: She is alert and oriented to person, place, and time. Psychiatric:         Mood and Affect: Mood normal.         Behavior: Behavior normal.            ASSESSMENT/PLAN:    1. Multinodular goiter (nontoxic)  -     US THYROID; Future  -     T4, Free; Future  I do not put her through another fine-needle aspirate at this time. Since this was her first thyroid ultrasound we will wait 6 months and get another 1 to see how she is doing. Also ordered a T4 to make sure that she has got normal levels. I will call her with those results. Return in about 6 months (around 4/26/2023). An electronic signature was used to authenticate this note. Belia Zapata MD       Please note that this chart was generated using dragon dictation software. Although every effort was made to ensure the accuracy of this automated transcription, some errors in transcription may have occurred.

## 2022-10-31 ENCOUNTER — OFFICE VISIT (OUTPATIENT)
Dept: PULMONOLOGY | Age: 62
End: 2022-10-31
Payer: COMMERCIAL

## 2022-10-31 VITALS
SYSTOLIC BLOOD PRESSURE: 127 MMHG | HEIGHT: 65 IN | TEMPERATURE: 97.4 F | WEIGHT: 178.8 LBS | DIASTOLIC BLOOD PRESSURE: 68 MMHG | BODY MASS INDEX: 29.79 KG/M2 | HEART RATE: 63 BPM | OXYGEN SATURATION: 97 %

## 2022-10-31 DIAGNOSIS — D72.10 EOSINOPHILIA, UNSPECIFIED TYPE: ICD-10-CM

## 2022-10-31 DIAGNOSIS — U09.9 LONG COVID: ICD-10-CM

## 2022-10-31 DIAGNOSIS — J44.9 MODERATE COPD (CHRONIC OBSTRUCTIVE PULMONARY DISEASE) (HCC): ICD-10-CM

## 2022-10-31 DIAGNOSIS — K21.9 GASTROESOPHAGEAL REFLUX DISEASE WITHOUT ESOPHAGITIS: ICD-10-CM

## 2022-10-31 DIAGNOSIS — R05.3 CHRONIC COUGH: ICD-10-CM

## 2022-10-31 DIAGNOSIS — K44.9 HIATAL HERNIA: ICD-10-CM

## 2022-10-31 DIAGNOSIS — J45.50 SEVERE PERSISTENT REACTIVE AIRWAY DISEASE WITHOUT COMPLICATION: Primary | ICD-10-CM

## 2022-10-31 LAB
BASOPHILS ABSOLUTE: 0.1 K/UL (ref 0–0.2)
BASOPHILS RELATIVE PERCENT: 0.8 % (ref 0–1)
EOSINOPHILS ABSOLUTE: 0.5 K/UL (ref 0–0.6)
EOSINOPHILS RELATIVE PERCENT: 6.9 % (ref 0–5)
FENO: 19 PPB
HCT VFR BLD CALC: 42.3 % (ref 37–47)
HEMOGLOBIN: 13.3 G/DL (ref 12–16)
IMMATURE GRANULOCYTES #: 0 K/UL
LYMPHOCYTES ABSOLUTE: 1.7 K/UL (ref 1.1–4.5)
LYMPHOCYTES RELATIVE PERCENT: 22.5 % (ref 20–40)
MCH RBC QN AUTO: 27.7 PG (ref 27–31)
MCHC RBC AUTO-ENTMCNC: 31.4 G/DL (ref 33–37)
MCV RBC AUTO: 88.1 FL (ref 81–99)
MONOCYTES ABSOLUTE: 0.7 K/UL (ref 0–0.9)
MONOCYTES RELATIVE PERCENT: 9.2 % (ref 0–10)
NEUTROPHILS ABSOLUTE: 4.5 K/UL (ref 1.5–7.5)
NEUTROPHILS RELATIVE PERCENT: 60.3 % (ref 50–65)
PDW BLD-RTO: 15.2 % (ref 11.5–14.5)
PLATELET # BLD: 264 K/UL (ref 130–400)
PMV BLD AUTO: 9.5 FL (ref 9.4–12.3)
RBC # BLD: 4.8 M/UL (ref 4.2–5.4)
WBC # BLD: 7.4 K/UL (ref 4.8–10.8)

## 2022-10-31 PROCEDURE — 3078F DIAST BP <80 MM HG: CPT | Performed by: INTERNAL MEDICINE

## 2022-10-31 PROCEDURE — 99214 OFFICE O/P EST MOD 30 MIN: CPT | Performed by: INTERNAL MEDICINE

## 2022-10-31 PROCEDURE — 3074F SYST BP LT 130 MM HG: CPT | Performed by: INTERNAL MEDICINE

## 2022-10-31 PROCEDURE — 95012 NITRIC OXIDE EXP GAS DETER: CPT | Performed by: INTERNAL MEDICINE

## 2022-10-31 ASSESSMENT — ENCOUNTER SYMPTOMS
SHORTNESS OF BREATH: 1
WHEEZING: 1
ABDOMINAL PAIN: 0
APNEA: 0
ANAL BLEEDING: 0
COUGH: 1
RHINORRHEA: 0
BACK PAIN: 0
ABDOMINAL DISTENTION: 0
CHEST TIGHTNESS: 0

## 2022-10-31 ASSESSMENT — PULMONARY FUNCTION TESTS: FENO: 19

## 2022-10-31 NOTE — PROGRESS NOTES
Pulmonary and Sleep Medicine    Bruna Desouza (:  1960) is a 60 y.o. female,Established patient, here for evaluation of the following chief complaint(s):  Follow-up (Follow up- PFT, Labs, CXR)      Referring physician:  No referring provider defined for this encounter. ASSESSMENT/PLAN:  1. Severe persistent reactive airway disease without complication  -     MOUTH PIECE  -     POCT Nitric Oxide  2. Long COVID  3. Chronic cough  4. Gastroesophageal reflux disease without esophagitis  5. Hiatal hernia  6. Moderate COPD (chronic obstructive pulmonary disease) (HCC)  -     Alpha-1-Antitrypsin w Phenotype; Future  7. Eosinophilia, unspecified type  -     Strongyloides Ab, IgG; Future  -     Ova and parasite screen; Future  -     CBC with Auto Differential; Future  -     IgE; Future      She very likely has eosinophilic asthma. We will check FeNO. We will work-up the eosinophilia. Check alpha-1 antitrypsin level. Akua Cavazos MD, FCCP, DAB    Return in about 4 weeks (around 2022). SUBJECTIVE/OBJECTIVE:  The patient is here for follow-up on cough and asthma. She did have a pulmonary function study that actually showed COPD in addition to reversibility. .  She is currently using the Advair 500/50 she feels 100% better by her report. She does have persistent eosinophilia. Her IgE level is severely elevated. Prior to Visit Medications    Medication Sig Taking? Authorizing Provider   fluticasone-salmeterol (ADVAIR DISKUS) 500-50 MCG/ACT AEPB diskus inhaler Inhale 1 puff into the lungs in the morning and 1 puff in the evening.  Yes Gabriella Aguirre MD   busPIRone (BUSPAR) 15 MG tablet TAKE 1 TABLET BY MOUTH TWICE DAILY AS NEEDED FOR ANXIETY Yes David Gauthier MD   albuterol sulfate HFA (PROVENTIL;VENTOLIN;PROAIR) 108 (90 Base) MCG/ACT inhaler INHALE 2 PUFFS INTO THE LUNGS FOUR TIMES DAILY AS NEEDED FOR WHEEZING Yes David Gauthier MD   fluticasone (FLONASE) 50 MCG/ACT nasal spray SHAKE LIQUID AND USE 2 SPRAYS IN EACH NOSTRIL DAILY Yes Lida Lebron MD   Levocetirizine Dihydrochloride (XYZAL PO) Take by mouth Yes Historical Provider, MD   montelukast (SINGULAIR) 10 MG tablet Take 1 tablet by mouth daily Yes Jessenia Hernandez PA-C   ibandronate (BONIVA) 150 MG tablet Take 1 tablet by mouth every 30 days Take one (1) tablet once per month in the morning with a full glass of water, on an empty stomach, and do not take anything else by mouth or lie down for the next 30 minutes. Yes Lida Lebron MD   vitamin D (ERGOCALCIFEROL) 1.25 MG (46413 UT) CAPS capsule TAKE 1 CAPSULE BY MOUTH 1 TIME WEEKLY Yes Lida Lebron MD   rosuvastatin (CRESTOR) 10 MG tablet TAKE 1 TABLET BY MOUTH EVERY NIGHT AT BEDTIME Yes Lida Lebron MD   metoprolol succinate (TOPROL XL) 50 MG extended release tablet TAKE 1 TABLET BY MOUTH DAILY Yes Lida Lebron MD   FLUoxetine (PROZAC) 40 MG capsule TAKE ONE CAPSULE BY MOUTH DAILY Yes Lida Lebron MD   pantoprazole (PROTONIX) 40 MG tablet TAKE 1 TABLET BY MOUTH EVERY DAY Yes Lida Lebron MD   rosuvastatin (CRESTOR) 10 MG tablet TAKE 1 TABLET BY MOUTH EVERY NIGHT Yes Lida Lebron MD   Cholecalciferol (VITAMIN D3) 2000 units CAPS Take by mouth Yes Historical Provider, MD   aspirin 81 MG chewable tablet Take 81 mg by mouth daily Yes Historical Provider, MD        Review of Systems   Constitutional:  Negative for activity change, appetite change, chills, diaphoresis and fatigue. HENT:  Negative for congestion, dental problem, drooling, ear discharge, postnasal drip and rhinorrhea. Eyes:  Negative for visual disturbance. Respiratory:  Positive for cough, shortness of breath and wheezing. Negative for apnea and chest tightness. Gastrointestinal:  Negative for abdominal distention, abdominal pain and anal bleeding. Endocrine: Negative for cold intolerance, heat intolerance and polydipsia.    Genitourinary:  Negative for difficulty urinating, dysuria, enuresis and flank pain. Musculoskeletal:  Negative for arthralgias, back pain and gait problem. Allergic/Immunologic: Negative for environmental allergies. Neurological:  Negative for dizziness, facial asymmetry, light-headedness and headaches. Vitals:    10/31/22 1059   BP: 127/68   Pulse: 63   Temp: 97.4 °F (36.3 °C)   SpO2: 97%     BMI Readings from Last 1 Encounters:   10/31/22 29.75 kg/m²         Physical Exam  Vitals reviewed. Constitutional:       Appearance: Normal appearance. HENT:      Head: Normocephalic and atraumatic. Nose: Nose normal.   Eyes:      Extraocular Movements: Extraocular movements intact. Conjunctiva/sclera: Conjunctivae normal.   Cardiovascular:      Rate and Rhythm: Normal rate and regular rhythm. Heart sounds: No murmur heard. No friction rub. Pulmonary:      Effort: Pulmonary effort is normal. No respiratory distress. Breath sounds: Normal breath sounds. No stridor. No wheezing, rhonchi or rales. Abdominal:      General: There is no distension. Palpations: There is no mass. Tenderness: There is no abdominal tenderness. There is no guarding or rebound. Musculoskeletal:      Cervical back: Normal range of motion and neck supple. Neurological:      Mental Status: She is alert and oriented to person, place, and time. This note was generated using a voice recognition software. Errors in voice recognition may have occurred. An electronic signature was used to authenticate this note.     --Cruzito Orellana MD

## 2022-11-01 LAB
CRYPTOSPORIDIUM ANTIGEN STOOL: NORMAL
GIARDIA ANTIGEN STOOL: NORMAL

## 2022-11-03 LAB
IGE: 1391 KU/L
STRONGYLOIDES ANTIBODY: 0.1 IV

## 2022-11-04 LAB
ALPHA-1 ANTITRYPSIN PHENOTYPE: NORMAL
ALPHA-1 ANTITRYPSIN: 129 MG/DL (ref 90–200)

## 2022-11-10 RX ORDER — ALBUTEROL SULFATE 90 UG/1
AEROSOL, METERED RESPIRATORY (INHALATION)
Qty: 8.5 G | Refills: 1 | Status: SHIPPED | OUTPATIENT
Start: 2022-11-10

## 2022-11-15 RX ORDER — ERGOCALCIFEROL 1.25 MG/1
CAPSULE ORAL
Qty: 4 CAPSULE | Refills: 3 | Status: SHIPPED | OUTPATIENT
Start: 2022-11-15

## 2022-11-15 NOTE — TELEPHONE ENCOUNTER
Jimena Hitchcock called requesting a refill of the below medication which has been pended for you:     Requested Prescriptions     Pending Prescriptions Disp Refills    vitamin D (ERGOCALCIFEROL) 1.25 MG (52116 UT) CAPS capsule [Pharmacy Med Name: VITAMIN D2 50,000IU (ERGO) CAP RX] 4 capsule 3     Sig: TAKE 1 CAPSULE BY MOUTH 1 TIME WEEKLY       Last Appointment Date: 8/24/2022  Next Appointment Date: 12/13/2022    Allergies   Allergen Reactions    Morphine And Related Headaches

## 2022-11-30 ENCOUNTER — OFFICE VISIT (OUTPATIENT)
Dept: PULMONOLOGY | Age: 62
End: 2022-11-30
Payer: COMMERCIAL

## 2022-11-30 VITALS
OXYGEN SATURATION: 92 % | TEMPERATURE: 97.6 F | SYSTOLIC BLOOD PRESSURE: 126 MMHG | HEIGHT: 65 IN | WEIGHT: 171.2 LBS | DIASTOLIC BLOOD PRESSURE: 68 MMHG | HEART RATE: 77 BPM | BODY MASS INDEX: 28.52 KG/M2

## 2022-11-30 DIAGNOSIS — K21.9 GASTROESOPHAGEAL REFLUX DISEASE WITHOUT ESOPHAGITIS: ICD-10-CM

## 2022-11-30 DIAGNOSIS — R76.8 ELEVATED IGE LEVEL: ICD-10-CM

## 2022-11-30 DIAGNOSIS — R05.3 CHRONIC COUGH: ICD-10-CM

## 2022-11-30 DIAGNOSIS — U09.9 LONG COVID: ICD-10-CM

## 2022-11-30 DIAGNOSIS — J44.9 MODERATE COPD (CHRONIC OBSTRUCTIVE PULMONARY DISEASE) (HCC): ICD-10-CM

## 2022-11-30 DIAGNOSIS — J82.83 EOSINOPHILIC ASTHMA: ICD-10-CM

## 2022-11-30 DIAGNOSIS — J45.50 SEVERE PERSISTENT REACTIVE AIRWAY DISEASE WITHOUT COMPLICATION: Primary | ICD-10-CM

## 2022-11-30 PROCEDURE — 3074F SYST BP LT 130 MM HG: CPT | Performed by: INTERNAL MEDICINE

## 2022-11-30 PROCEDURE — 99214 OFFICE O/P EST MOD 30 MIN: CPT | Performed by: INTERNAL MEDICINE

## 2022-11-30 PROCEDURE — 3078F DIAST BP <80 MM HG: CPT | Performed by: INTERNAL MEDICINE

## 2022-11-30 RX ORDER — PREDNISONE 10 MG/1
TABLET ORAL
Qty: 35 TABLET | Refills: 0 | Status: SHIPPED | OUTPATIENT
Start: 2022-11-30

## 2022-11-30 ASSESSMENT — ENCOUNTER SYMPTOMS
ABDOMINAL PAIN: 0
CHEST TIGHTNESS: 0
APNEA: 0
COUGH: 1
SHORTNESS OF BREATH: 1
RHINORRHEA: 0
ABDOMINAL DISTENTION: 0
BACK PAIN: 0
WHEEZING: 1
ANAL BLEEDING: 0

## 2022-11-30 NOTE — PROGRESS NOTES
Pulmonary and Sleep Medicine    Marva Vale (:  1960) is a 60 y.o. female,Established patient, here for evaluation of the following chief complaint(s):  Follow-up (Follow up- cough better, now worse for the past week )      Referring physician:  No referring provider defined for this encounter. ASSESSMENT/PLAN:  1. Severe persistent reactive airway disease without complication  -     predniSONE (DELTASONE) 10 MG tablet; 40 mg a day and taper by 10 mg every third day to off, Disp-35 tablet, R-0Normal  -     dupilumab (DUPIXENT) 300 MG/2ML SOSY injection; Inject 2 mLs into the skin every 14 days, Disp-3.92 mL, R-11NO PRINT  -     POCT Nitric Oxide  -     MOUTH PIECE  2. Moderate COPD (chronic obstructive pulmonary disease) ([address]  3. Long COVID  4. Chronic cough  5. Gastroesophageal reflux disease without esophagitis  6. Eosinophilic asthma  7. Elevated IgE level      She continues to have significant symptoms of cough and wheezing despite her being on large dose of inhaled corticosteroid long-acting beta agonist combo. She is on Advair 500. She does have significant eosinophilia and elevated IgE with negative parasitic work-up. We will start her on Dupixent. Continue current inhalers. Rona Noriega MD, [facility]    Return in about 4 weeks (around 2022). SUBJECTIVE/OBJECTIVE:  Patient is here for follow-up regarding and asthma. She had significantly elevated IgE E level. She also has a significantly elevated eosinophil count. She continues to cough despite being on Advair. Her cough is mostly dry cough. She works as manager at [employer]. Prior to Visit Medications    Medication Sig Taking?  Authorizing Provider   vitamin D (ERGOCALCIFEROL) 1.25 MG (90019 UT) CAPS capsule TAKE 1 CAPSULE BY MOUTH 1 TIME WEEKLY Yes Dallas Vazquez MD   albuterol sulfate HFA (PROVENTIL;VENTOLIN;PROAIR) 108 (90 Base) MCG/ACT inhaler INHALE 2 PUFFS INTO THE LUNGS FOUR TIMES DAILY AS NEEDED FOR WHEEZING Yes Gregory Iniguez MD   fluticasone-salmeterol (ADVAIR DISKUS) 500-50 MCG/ACT AEPB diskus inhaler Inhale 1 puff into the lungs in the morning and 1 puff in the evening. Yes Gabriella Aguirre MD   busPIRone (BUSPAR) 15 MG tablet TAKE 1 TABLET BY MOUTH TWICE DAILY AS NEEDED FOR ANXIETY Yes Gregory Iniguez MD   fluticasone (FLONASE) 50 MCG/ACT nasal spray SHAKE LIQUID AND USE 2 SPRAYS IN EACH NOSTRIL DAILY Yes Gregory Iniguez MD   Levocetirizine Dihydrochloride (XYZAL PO) Take by mouth Yes Historical Provider, MD   montelukast (SINGULAIR) 10 MG tablet Take 1 tablet by mouth daily Yes Rebecca Barker PA-C   ibandronate (BONIVA) 150 MG tablet Take 1 tablet by mouth every 30 days Take one (1) tablet once per month in the morning with a full glass of water, on an empty stomach, and do not take anything else by mouth or lie down for the next 30 minutes. Yes Gregory Iniguez MD   rosuvastatin (CRESTOR) 10 MG tablet TAKE 1 TABLET BY MOUTH EVERY NIGHT AT BEDTIME Yes Greogry Iniguez MD   metoprolol succinate (TOPROL XL) 50 MG extended release tablet TAKE 1 TABLET BY MOUTH DAILY Yes Gregory Iniguez MD   FLUoxetine (PROZAC) 40 MG capsule TAKE ONE CAPSULE BY MOUTH DAILY Yes Gregory Iniguez MD   pantoprazole (PROTONIX) 40 MG tablet TAKE 1 TABLET BY MOUTH EVERY DAY Yes Gregory Iniguez MD   rosuvastatin (CRESTOR) 10 MG tablet TAKE 1 TABLET BY MOUTH EVERY NIGHT Yes Gregory Iniguez MD   Cholecalciferol (VITAMIN D3) 2000 units CAPS Take by mouth Yes Historical Provider, MD   aspirin 81 MG chewable tablet Take 81 mg by mouth daily Yes Historical Provider, MD        Review of Systems   Constitutional:  Negative for activity change, appetite change, chills, diaphoresis and fatigue. HENT:  Negative for congestion, dental problem, drooling, ear discharge, postnasal drip and rhinorrhea. Eyes:  Negative for visual disturbance. Respiratory:  Positive for cough, shortness of breath and wheezing. Negative for apnea and chest tightness. Gastrointestinal:  Negative for abdominal distention, abdominal pain and anal bleeding. Endocrine: Negative for cold intolerance, heat intolerance and polydipsia. Genitourinary:  Negative for difficulty urinating, dysuria, enuresis and flank pain. Musculoskeletal:  Negative for arthralgias, back pain and gait problem. Allergic/Immunologic: Negative for environmental allergies. Neurological:  Negative for dizziness, facial asymmetry, light-headedness and headaches. Vitals:    11/30/22 1109   BP: 126/68   Pulse: 77   Temp: 97.6 °F (36.4 °C)   SpO2: 92%     BMI Readings from Last 1 Encounters:   11/30/22 28.49 kg/m²         Physical Exam  Vitals reviewed. Constitutional:       Appearance: Normal appearance. HENT:      Head: Normocephalic and atraumatic. Nose: Nose normal.   Eyes:      Extraocular Movements: Extraocular movements intact. Conjunctiva/sclera: Conjunctivae normal.   Cardiovascular:      Rate and Rhythm: Normal rate and regular rhythm. Heart sounds: No murmur heard. No friction rub. Pulmonary:      Effort: Pulmonary effort is normal. No respiratory distress. Breath sounds: Normal breath sounds. No stridor. No wheezing, rhonchi or rales. Abdominal:      General: There is no distension. Palpations: There is no mass. Tenderness: There is no abdominal tenderness. There is no guarding or rebound. Musculoskeletal:      Cervical back: Normal range of motion and neck supple. Neurological:      Mental Status: She is alert and oriented to person, place, and time. This note was generated using a voice recognition software. Errors in voice recognition may have occurred. An electronic signature was used to authenticate this note.     --Joan Barrios MD

## 2022-12-05 DIAGNOSIS — M25.561 CHRONIC PAIN OF BOTH KNEES: ICD-10-CM

## 2022-12-05 DIAGNOSIS — G89.29 CHRONIC PAIN OF BOTH KNEES: ICD-10-CM

## 2022-12-05 DIAGNOSIS — M25.562 CHRONIC PAIN OF BOTH KNEES: ICD-10-CM

## 2022-12-05 RX ORDER — FLUTICASONE PROPIONATE 50 MCG
SPRAY, SUSPENSION (ML) NASAL
Qty: 16 G | Refills: 2 | Status: SHIPPED | OUTPATIENT
Start: 2022-12-05

## 2022-12-05 RX ORDER — BUSPIRONE HYDROCHLORIDE 15 MG/1
TABLET ORAL
Qty: 60 TABLET | Refills: 0 | Status: SHIPPED | OUTPATIENT
Start: 2022-12-05 | End: 2023-01-24

## 2022-12-05 RX ORDER — HYDROCODONE BITARTRATE AND ACETAMINOPHEN 7.5; 325 MG/1; MG/1
1 TABLET ORAL DAILY PRN
Qty: 30 TABLET | Refills: 0 | Status: SHIPPED | OUTPATIENT
Start: 2022-12-05 | End: 2023-01-04

## 2022-12-05 NOTE — TELEPHONE ENCOUNTER
Last Appointment Date: 8/24/2022  Next Appointment Date: 12/13/2022    Allergies   Allergen Reactions    Morphine And Related Headaches       Patient needs refill on   Requested Prescriptions     Pending Prescriptions Disp Refills    HYDROcodone-acetaminophen (NORCO) 7.5-325 MG per tablet 30 tablet 0     Sig: Take 1 tablet by mouth daily as needed for Pain for up to 30 days.

## 2022-12-05 NOTE — TELEPHONE ENCOUNTER
Cedric Kiran called requesting a refill of the below medication which has been pended for you:     Requested Prescriptions     Pending Prescriptions Disp Refills    fluticasone (FLONASE) 50 MCG/ACT nasal spray [Pharmacy Med Name: FLUTICASONE 50MCG NASAL SP (120) RX] 16 g 2     Sig: SHAKE LIQUID AND USE 2 SPRAYS IN EACH NOSTRIL DAILY    busPIRone (BUSPAR) 15 MG tablet [Pharmacy Med Name: BUSPIRONE 15MG TABLETS] 60 tablet 2     Sig: TAKE 1 TABLET BY MOUTH TWICE DAILY AS NEEDED FOR ANXIETY       Last Appointment Date: 8/24/2022  Next Appointment Date: 12/13/2022    Allergies   Allergen Reactions    Morphine And Related Headaches

## 2022-12-22 ENCOUNTER — APPOINTMENT (OUTPATIENT)
Dept: CT IMAGING | Age: 62
End: 2022-12-22
Payer: COMMERCIAL

## 2022-12-22 ENCOUNTER — APPOINTMENT (OUTPATIENT)
Dept: GENERAL RADIOLOGY | Age: 62
End: 2022-12-22
Payer: COMMERCIAL

## 2022-12-22 ENCOUNTER — HOSPITAL ENCOUNTER (EMERGENCY)
Age: 62
Discharge: HOME | End: 2022-12-22
Payer: COMMERCIAL

## 2022-12-22 VITALS
DIASTOLIC BLOOD PRESSURE: 90 MMHG | BODY MASS INDEX: 28.29 KG/M2 | OXYGEN SATURATION: 90 % | SYSTOLIC BLOOD PRESSURE: 155 MMHG | RESPIRATION RATE: 18 BRPM | WEIGHT: 170 LBS | TEMPERATURE: 97.8 F | HEART RATE: 75 BPM

## 2022-12-22 DIAGNOSIS — D72.10 EOSINOPHILIA, UNSPECIFIED TYPE: ICD-10-CM

## 2022-12-22 DIAGNOSIS — R09.1 PLEURISY: Primary | ICD-10-CM

## 2022-12-22 LAB
ADENOVIRUS BY PCR: NOT DETECTED
ALBUMIN SERPL-MCNC: 4 G/DL (ref 3.5–5.2)
ALP BLD-CCNC: 133 U/L (ref 35–104)
ALT SERPL-CCNC: 13 U/L (ref 5–33)
ANION GAP SERPL CALCULATED.3IONS-SCNC: 10 MMOL/L (ref 7–19)
AST SERPL-CCNC: 13 U/L (ref 5–32)
BASOPHILS ABSOLUTE: 0.1 K/UL (ref 0–0.2)
BASOPHILS RELATIVE PERCENT: 0.9 % (ref 0–1)
BILIRUB SERPL-MCNC: 1.1 MG/DL (ref 0.2–1.2)
BORDETELLA PARAPERTUSSIS BY PCR: NOT DETECTED
BORDETELLA PERTUSSIS BY PCR: NOT DETECTED
BUN BLDV-MCNC: 16 MG/DL (ref 8–23)
CALCIUM SERPL-MCNC: 9 MG/DL (ref 8.8–10.2)
CHLAMYDOPHILIA PNEUMONIAE BY PCR: NOT DETECTED
CHLORIDE BLD-SCNC: 104 MMOL/L (ref 98–111)
CO2: 25 MMOL/L (ref 22–29)
CORONAVIRUS 229E BY PCR: NOT DETECTED
CORONAVIRUS HKU1 BY PCR: NOT DETECTED
CORONAVIRUS NL63 BY PCR: NOT DETECTED
CORONAVIRUS OC43 BY PCR: NOT DETECTED
CREAT SERPL-MCNC: 0.8 MG/DL (ref 0.5–0.9)
EOSINOPHILS ABSOLUTE: 1.9 K/UL (ref 0–0.6)
EOSINOPHILS RELATIVE PERCENT: 22.4 % (ref 0–5)
GFR SERPL CREATININE-BSD FRML MDRD: >60 ML/MIN/{1.73_M2}
GLUCOSE BLD-MCNC: 94 MG/DL (ref 74–109)
HCT VFR BLD CALC: 40.4 % (ref 37–47)
HEMOGLOBIN: 12.8 G/DL (ref 12–16)
HUMAN METAPNEUMOVIRUS BY PCR: NOT DETECTED
HUMAN RHINOVIRUS/ENTEROVIRUS BY PCR: NOT DETECTED
IMMATURE GRANULOCYTES #: 0 K/UL
INFLUENZA A BY PCR: NOT DETECTED
INFLUENZA B BY PCR: NOT DETECTED
LYMPHOCYTES ABSOLUTE: 1.3 K/UL (ref 1.1–4.5)
LYMPHOCYTES RELATIVE PERCENT: 15.1 % (ref 20–40)
MCH RBC QN AUTO: 27.9 PG (ref 27–31)
MCHC RBC AUTO-ENTMCNC: 31.7 G/DL (ref 33–37)
MCV RBC AUTO: 88 FL (ref 81–99)
MONOCYTES ABSOLUTE: 0.7 K/UL (ref 0–0.9)
MONOCYTES RELATIVE PERCENT: 7.8 % (ref 0–10)
MYCOPLASMA PNEUMONIAE BY PCR: NOT DETECTED
NEUTROPHILS ABSOLUTE: 4.6 K/UL (ref 1.5–7.5)
NEUTROPHILS RELATIVE PERCENT: 53.4 % (ref 50–65)
PARAINFLUENZA VIRUS 1 BY PCR: NOT DETECTED
PARAINFLUENZA VIRUS 2 BY PCR: NOT DETECTED
PARAINFLUENZA VIRUS 3 BY PCR: NOT DETECTED
PARAINFLUENZA VIRUS 4 BY PCR: NOT DETECTED
PDW BLD-RTO: 16.6 % (ref 11.5–14.5)
PLATELET # BLD: 201 K/UL (ref 130–400)
PMV BLD AUTO: 8.7 FL (ref 9.4–12.3)
POTASSIUM REFLEX MAGNESIUM: 3.7 MMOL/L (ref 3.5–5)
RBC # BLD: 4.59 M/UL (ref 4.2–5.4)
RESPIRATORY SYNCYTIAL VIRUS BY PCR: NOT DETECTED
SARS-COV-2, PCR: NOT DETECTED
SODIUM BLD-SCNC: 139 MMOL/L (ref 136–145)
TOTAL PROTEIN: 7.1 G/DL (ref 6.6–8.7)
TROPONIN: <0.01 NG/ML (ref 0–0.03)
WBC # BLD: 8.6 K/UL (ref 4.8–10.8)

## 2022-12-22 PROCEDURE — 80053 COMPREHEN METABOLIC PANEL: CPT

## 2022-12-22 PROCEDURE — 84484 ASSAY OF TROPONIN QUANT: CPT

## 2022-12-22 PROCEDURE — 71101 X-RAY EXAM UNILAT RIBS/CHEST: CPT

## 2022-12-22 PROCEDURE — 71275 CT ANGIOGRAPHY CHEST: CPT

## 2022-12-22 PROCEDURE — 99285 EMERGENCY DEPT VISIT HI MDM: CPT | Performed by: NURSE PRACTITIONER

## 2022-12-22 PROCEDURE — 6360000004 HC RX CONTRAST MEDICATION: Performed by: NURSE PRACTITIONER

## 2022-12-22 PROCEDURE — 36415 COLL VENOUS BLD VENIPUNCTURE: CPT

## 2022-12-22 PROCEDURE — 0202U NFCT DS 22 TRGT SARS-COV-2: CPT

## 2022-12-22 PROCEDURE — 96374 THER/PROPH/DIAG INJ IV PUSH: CPT

## 2022-12-22 PROCEDURE — 6360000002 HC RX W HCPCS: Performed by: NURSE PRACTITIONER

## 2022-12-22 PROCEDURE — 85025 COMPLETE CBC W/AUTO DIFF WBC: CPT

## 2022-12-22 RX ORDER — HYDROCODONE BITARTRATE AND ACETAMINOPHEN 5; 325 MG/1; MG/1
1 TABLET ORAL EVERY 6 HOURS PRN
Qty: 10 TABLET | Refills: 0 | Status: SHIPPED | OUTPATIENT
Start: 2022-12-22 | End: 2022-12-25

## 2022-12-22 RX ORDER — KETOROLAC TROMETHAMINE 30 MG/ML
30 INJECTION, SOLUTION INTRAMUSCULAR; INTRAVENOUS ONCE
Status: COMPLETED | OUTPATIENT
Start: 2022-12-22 | End: 2022-12-22

## 2022-12-22 RX ORDER — 0.9 % SODIUM CHLORIDE 0.9 %
1000 INTRAVENOUS SOLUTION INTRAVENOUS ONCE
Status: DISCONTINUED | OUTPATIENT
Start: 2022-12-22 | End: 2022-12-22

## 2022-12-22 RX ADMIN — KETOROLAC TROMETHAMINE 30 MG: 30 INJECTION, SOLUTION INTRAMUSCULAR; INTRAVENOUS at 17:02

## 2022-12-22 RX ADMIN — IOPAMIDOL 90 ML: 755 INJECTION, SOLUTION INTRAVENOUS at 18:32

## 2022-12-22 ASSESSMENT — PAIN SCALES - GENERAL: PAINLEVEL_OUTOF10: 10

## 2022-12-22 ASSESSMENT — PAIN DESCRIPTION - LOCATION: LOCATION: SHOULDER

## 2022-12-22 ASSESSMENT — ENCOUNTER SYMPTOMS
SHORTNESS OF BREATH: 0
COUGH: 1
ABDOMINAL PAIN: 0

## 2022-12-22 ASSESSMENT — PAIN - FUNCTIONAL ASSESSMENT: PAIN_FUNCTIONAL_ASSESSMENT: 0-10

## 2022-12-22 ASSESSMENT — PAIN DESCRIPTION - ORIENTATION: ORIENTATION: LEFT

## 2022-12-23 NOTE — ED PROVIDER NOTES
[facility] EMERGENCY DEPT  eMERGENCY dEPARTMENT eNCOUnter      Pt Name: Radha Hardy  MRN: [id number]  [place] 1960  Date of evaluation: 12/22/2022  Provider: Serge Martinez, [address]       Chief Complaint   Patient presents with    Shoulder Pain     Pt here with left sided shoulder pain started 30 min pta         HISTORY OF PRESENT ILLNESS   (Location/Symptom, Timing/Onset,Context/Setting, Quality, Duration, Modifying Factors, Severity)  Note limiting factors. Radha Hardy is a 60 y.o. female who presents to the emergency department with a sudden onset of left lateral chest pain when she sat down at work. Hurts when she moves or coughs. No injury. Has had a chronic cough. NO shortness of breath. No cardiac history. The history is provided by the patient. Chest Pain  Pain location:  L lateral chest  Pain quality: sharp and stabbing    Pain radiates to:  Does not radiate  Pain severity:  Severe  Onset quality:  Sudden  Duration:  1 hour  Timing:  Constant  Progression:  Unchanged  Chronicity:  New  Associated symptoms: cough    Associated symptoms: no abdominal pain, no dizziness, no fever and no shortness of breath      NursingNotes were reviewed. REVIEW OF SYSTEMS    (2-9 systems for level 4, 10 or more for level 5)     Review of Systems   Constitutional:  Negative for fever. Respiratory:  Positive for cough. Negative for shortness of breath. Cardiovascular:  Positive for chest pain. Gastrointestinal:  Negative for abdominal pain. Neurological:  Negative for dizziness. Except as noted above the remainder of the review of systems was reviewed and negative.        PAST MEDICAL HISTORY     Past Medical History:   Diagnosis Date    Cough     Depression     Eosinophilic asthma 50/52/3310    Generalized anxiety disorder     GERD (gastroesophageal reflux disease)     Headache     Hyperlipidemia     Hypertension     Insomnia     Moderate COPD (chronic obstructive pulmonary disease) ([facility] [address] 11/30/2022    Osteoarthritis     Vitamin D deficiency          SURGICALHISTORY       Past Surgical History:   Procedure Laterality Date    ADENOIDECTOMY      BREAST BIOPSY      BREAST BIOPSY      COLONOSCOPY      COLONOSCOPY N/A 02/10/2022    Dr Evelyn Torres-Questionable lipoma-BCM, 5 yr recall    OVARY REMOVAL Right     TONSILLECTOMY      US THYROID BIOPSY  10/19/2022    US THYROID BIOPSY 10/19/2022 L ULTRASOUND         CURRENT MEDICATIONS       Discharge Medication List as of 12/22/2022  7:33 PM        CONTINUE these medications which have NOT CHANGED    Details   HYDROcodone-acetaminophen (NORCO) 7.5-325 MG per tablet Take 1 tablet by mouth daily as needed for Pain for up to 30 days. , Disp-30 tablet, R-0Normal      fluticasone (FLONASE) 50 MCG/ACT nasal spray SHAKE LIQUID AND USE 2 SPRAYS IN EACH NOSTRIL DAILY, Disp-16 g, R-2Normal      busPIRone (BUSPAR) 15 MG tablet TAKE 1 TABLET BY MOUTH TWICE DAILY AS NEEDED FOR ANXIETY, Disp-60 tablet, R-0Normal      predniSONE (DELTASONE) 10 MG tablet 40 mg a day and taper by 10 mg every third day to off, Disp-35 tablet, R-0Normal      dupilumab (DUPIXENT) 300 MG/2ML SOSY injection Inject 2 mLs into the skin every 14 days, Disp-3.92 mL, R-11NO PRINT      vitamin D (ERGOCALCIFEROL) 1.25 MG (83476 UT) CAPS capsule TAKE 1 CAPSULE BY MOUTH 1 TIME WEEKLY, Disp-4 capsule, R-3Normal      albuterol sulfate HFA (PROVENTIL;VENTOLIN;PROAIR) 108 (90 Base) MCG/ACT inhaler INHALE 2 PUFFS INTO THE LUNGS FOUR TIMES DAILY AS NEEDED FOR WHEEZING, Disp-8.5 g, R-1Normal      fluticasone-salmeterol (ADVAIR DISKUS) 500-50 MCG/ACT AEPB diskus inhaler Inhale 1 puff into the lungs in the morning and 1 puff in the evening., Disp-60 each, R-3Normal      Levocetirizine Dihydrochloride (XYZAL PO) Take by mouthHistorical Med      montelukast (SINGULAIR) 10 MG tablet Take 1 tablet by mouth daily, Disp-30 tablet, R-2Normal      ibandronate (BONIVA) 150 MG tablet Take 1 tablet by mouth every 30 days Take one (1) tablet once per month in the morning with a full glass of water, on an empty stomach, and do not take anything else by mouth or lie down for the next 30 minutes. , Disp-3 tablet, R-3Normal      rosuvastatin (CRESTOR) 10 MG tablet TAKE 1 TABLET BY MOUTH EVERY NIGHT AT BEDTIME, Disp-90 tablet, R-1ZERO refills remain on this prescription. Your patient is requesting advance approval of refills for this medication to [facility]      metoprolol succinate (TOPROL XL) 50 MG extended release tablet TAKE 1 TABLET BY MOUTH DAILY, Disp-90 tablet, R-1ZERO refills remain on this prescription. Your patient is requesting advance approval of refills for this medication to [facility] [place]      FLUoxetine (PROZAC) 40 MG capsule TAKE ONE CAPSULE BY MOUTH DAILY, Disp-90 capsule, R-1ZERO refills remain on this prescription. Your patient is requesting advance approval of refills for this medication to [facility] [place]      pantoprazole (PROTONIX) 40 MG tablet TAKE 1 TABLET BY MOUTH EVERY DAY, Disp-90 tablet, R-1ZERO refills remain on this prescription.  Your patient is requesting advance approval of refills for this medication to [facility] [place]      Cholecalciferol (VITAMIN D3) 2000 units CAPS Take by mouthHistorical Med      aspirin 81 MG chewable tablet Take 81 mg by mouth daily             ALLERGIES     Morphine and related    FAMILY HISTORY       Family History   Problem Relation Age of Onset    Lung Cancer Father     Colon Cancer Sister 62        COLON    Other Sister         PANCREATIC CANCER    Diabetes Brother     Coronary Art Dis Brother           SOCIAL HISTORY       Social History     Socioeconomic History    Marital status:     Number of children: 1   Occupational History    Occupation: [employer]    Tobacco Use    Smoking status: Never    Smokeless tobacco: Never   Substance and Sexual Activity    Alcohol use: Yes    Drug use: No    Sexual activity: Never     Social Determinants of Health     Financial Resource Strain: Low Risk     Difficulty of Paying Living Expenses: Not hard at all   Food Insecurity: No Food Insecurity    Worried About [address] in the Last Year: Never true    [facility] in the Last Year: Never true       SCREENINGS    [place] Coma Scale  Eye Opening: Spontaneous  Best Verbal Response: Oriented  Best Motor Response: Obeys commands  [place] Coma Scale Score: 15 @FLOW([id number])@      PHYSICAL EXAM    (up to 7 for level 4, 8 or more for level 5)     ED Triage Vitals [12/22/22 1638]   BP Temp Temp src Heart Rate Resp SpO2 Height Weight   139/88 97.8 °F (36.6 °C) -- 74 18 91 % -- 170 lb (77.1 kg)       Physical Exam  Vitals and nursing note reviewed. Constitutional:       Appearance: Normal appearance. She is well-developed. HENT:      Head: Normocephalic and atraumatic. Eyes:      General: No scleral icterus. Right eye: No discharge. Left eye: No discharge. Cardiovascular:      Rate and Rhythm: Normal rate and regular rhythm. Heart sounds: Normal heart sounds. Pulmonary:      Effort: No respiratory distress. Breath sounds: Wheezing and rhonchi present. Chest:      Chest wall: Tenderness present. No mass, deformity or swelling. Abdominal:      Palpations: Abdomen is soft. Musculoskeletal:      Cervical back: Normal range of motion and neck supple. Neurological:      Mental Status: She is alert and oriented to person, place, and time.    Psychiatric:         Behavior: Behavior normal.       DIAGNOSTIC RESULTS     EKG: All EKG's are interpreted by the Emergency Department Physician who either signs or Co-signsthis chart in the absence of a cardiologist.    Sinus rhythm no T wave changes read at 554 by Dr. Chelsea Aly:   Alonso Sousa such as CT, Ultrasound and MRI are read by the radiologist. Plain radiographic images are visualized and preliminarily interpreted by the emergency physician with the below findings:      Interpretation per the Radiologist below, if available at the time of this note:    CTA PULMONARY W CONTRAST   Final Result   1. NORMAL CT OF THE THORAX WITH CTA OF THE PULMONARY ARTERIES WITH CONTRAST   ENHANCEMENT. 2.NO EVIDENCE OF PULMONARY EMBOLUS. This CT exam was performed using one or more of the following dose reduction   techniques: automated exposure control, adjustment of the mA and/or kV according   to patient size, and/or use of iterative reconstruction technique. XR RIBS LEFT INCLUDE CHEST (MIN 3 VIEWS)   Final Result   No displaced rib fracture of the left hemithorax. ED BEDSIDEULTRASOUND:   Performed by ED Physician -none    LABS:  Labs Reviewed   CBC WITH AUTO DIFFERENTIAL - Abnormal; Notable for the following components:       Result Value    MCHC 31.7 (*)     RDW 16.6 (*)     MPV 8.7 (*)     Lymphocytes % 15.1 (*)     Eosinophils % 22.4 (*)     Eosinophils Absolute 1.90 (*)     All other components within normal limits   COMPREHENSIVE METABOLIC PANEL W/ REFLEX TO MG FOR LOW K - Abnormal; Notable for the following components:    Alkaline Phosphatase 133 (*)     All other components within normal limits   RESPIRATORY PANEL, MOLECULAR, WITH COVID-19   TROPONIN       All other labs were within normal range or not returned as of this dictation. EMERGENCY DEPARTMENT COURSE and DIFFERENTIALDIAGNOSIS/MDM:   Vitals:    Vitals:    12/22/22 1638 12/22/22 1945   BP: 139/88 (!) 155/90   Pulse: 74 75   Resp: 18 18   Temp: 97.8 °F (36.6 °C)    SpO2: 91% 90%   Weight: 170 lb (77.1 kg)            MDM  Number of Diagnoses or Management Options  Eosinophilia, unspecified type: established and worsening  Pleurisy: new and requires workup  Diagnosis management comments: Pt is seeing Dr Remi Cavazos and he is aware of her eosinophilia. No PE or cardiac problem.   Not typical cardiac pain more musculoskeletal.  Pt will try an antinflammatory and lortab if unrelieved pain. To continue to follow with her pcp          CONSULTS:  None    PROCEDURES:  Unless otherwise noted below, none     Procedures    FINAL IMPRESSION      1. Pleurisy    2. Eosinophilia, unspecified type        DISPOSITION/PLAN   DISPOSITION Decision To Discharge 12/22/2022 07:27:53 PM      PATIENT REFERRED TO:  MD LOUISE Little/ [address]  [phone]          DISCHARGE MEDICATIONS:  Discharge Medication List as of 12/22/2022  7:33 PM        START taking these medications    Details   HYDROcodone-acetaminophen (NORCO) 5-325 MG per tablet Take 1 tablet by mouth every 6 hours as needed for Pain for up to 3 days.  Max Daily Amount: 4 tablets, Disp-10 tablet, R-0Normal                (Please note that portions of this note were completed with a voice recognitionprogram.  Efforts were made to edit the dictations but occasionally words are mis-transcribed.)    RENNY Johnson (electronically signed)          RENNY Johnson  12/22/22 2011

## 2022-12-28 ENCOUNTER — OFFICE VISIT (OUTPATIENT)
Dept: PULMONOLOGY | Age: 62
End: 2022-12-28
Payer: COMMERCIAL

## 2022-12-28 VITALS
SYSTOLIC BLOOD PRESSURE: 128 MMHG | HEIGHT: 65 IN | TEMPERATURE: 97.7 F | HEART RATE: 72 BPM | BODY MASS INDEX: 28.16 KG/M2 | DIASTOLIC BLOOD PRESSURE: 72 MMHG | OXYGEN SATURATION: 95 % | WEIGHT: 169 LBS

## 2022-12-28 DIAGNOSIS — J44.9 MODERATE COPD (CHRONIC OBSTRUCTIVE PULMONARY DISEASE) (HCC): ICD-10-CM

## 2022-12-28 DIAGNOSIS — U09.9 LONG COVID: ICD-10-CM

## 2022-12-28 DIAGNOSIS — R59.0 HILAR ADENOPATHY: ICD-10-CM

## 2022-12-28 DIAGNOSIS — J82.83 EOSINOPHILIC ASTHMA: ICD-10-CM

## 2022-12-28 DIAGNOSIS — R05.3 CHRONIC COUGH: ICD-10-CM

## 2022-12-28 DIAGNOSIS — J45.50 SEVERE PERSISTENT REACTIVE AIRWAY DISEASE WITHOUT COMPLICATION: Primary | ICD-10-CM

## 2022-12-28 DIAGNOSIS — R76.8 ELEVATED IGE LEVEL: ICD-10-CM

## 2022-12-28 DIAGNOSIS — K21.9 GASTROESOPHAGEAL REFLUX DISEASE WITHOUT ESOPHAGITIS: ICD-10-CM

## 2022-12-28 DIAGNOSIS — R13.19 ESOPHAGEAL DYSPHAGIA: ICD-10-CM

## 2022-12-28 PROCEDURE — 99214 OFFICE O/P EST MOD 30 MIN: CPT | Performed by: INTERNAL MEDICINE

## 2022-12-28 PROCEDURE — 3078F DIAST BP <80 MM HG: CPT | Performed by: INTERNAL MEDICINE

## 2022-12-28 PROCEDURE — 3074F SYST BP LT 130 MM HG: CPT | Performed by: INTERNAL MEDICINE

## 2022-12-28 RX ORDER — PANTOPRAZOLE SODIUM 40 MG/1
TABLET, DELAYED RELEASE ORAL
Qty: 180 TABLET | Refills: 3 | Status: SHIPPED | OUTPATIENT
Start: 2022-12-28

## 2022-12-28 ASSESSMENT — ENCOUNTER SYMPTOMS
ABDOMINAL DISTENTION: 0
ABDOMINAL PAIN: 0
COUGH: 1
ANAL BLEEDING: 0
BACK PAIN: 0
SHORTNESS OF BREATH: 1
WHEEZING: 1
CHEST TIGHTNESS: 0
RHINORRHEA: 0
APNEA: 0

## 2022-12-28 NOTE — PROGRESS NOTES
Pulmonary and Sleep Medicine    Bertram Nassar (:  1960) is a 60 y.o. female,Established patient, here for evaluation of the following chief complaint(s):  Asthma (Asthma//)      Referring physician:  No referring provider defined for this encounter. ASSESSMENT/PLAN:  1. Severe persistent reactive airway disease without complication  2. Moderate COPD (chronic obstructive pulmonary disease) ([address]  3. Long COVID  4. Chronic cough  5. Gastroesophageal reflux disease without esophagitis  -     Ambulatory referral to Gastroenterology  -     pantoprazole (PROTONIX) 40 MG tablet; TAKE 1 TABLET BY MOUTH BID., Disp-180 tablet, [id number] refills remain on this prescription. Your patient is requesting advance approval of refills for this medication to [address] [place] [address]  6. Eosinophilic asthma  7. Elevated IgE level  8. Esophageal dysphagia  -     Ambulatory referral to Gastroenterology  9. Hilar adenopathy  -     CT CHEST W CONTRAST; Future      Persistent cough wheezing secondary to eosinophilic asthma avoid Dupixent. It is very likely that her symptoms are exacerbated by severe reflux and esophageal dysphagia and dysmotility. Refer to gastroenterology. In the meantime we will increase her proton pump inhibitor to twice daily. Follow-up in 4 weeks. Findings on the CT were CT of the chest was reviewed independently by myself. I do not agree with the report on the CT. I discussed with Dr. Doris Bejarano radiology regarding the discrepancy in the findings. The patient has diffuse enlarged lymphadenopathy in the bilateral hilar areas with bilateral peribronchial lymphadenopathy that suggestive of possible sarcoidosis. We will repeat CT of the chest in 1 month. Consider EBUS if findings are persistent and her symptoms do not improve. Dr. Doris Bejarano radiology concurs with my interpretation of the CT.        Sergio Huffman MD, [facility], [facility]    Return in about 4 weeks (around 1/25/2023). SUBJECTIVE/OBJECTIVE:  The patient is here for follow-up. She continues to use the inhalers. She feels they help her cough. She continues to have symptoms however. She is awaiting Dupixent injection. She developed a sudden left shoulder pain and back pain. She went to the emergency room at that time she had a CT pulmonary angiogram that showed no evidence of PE. Prior to Visit Medications    Medication Sig Taking? Authorizing Provider   fluticasone (FLONASE) 50 MCG/ACT nasal spray SHAKE LIQUID AND USE 2 SPRAYS IN EACH NOSTRIL DAILY Yes Ronak Sen MD   busPIRone (BUSPAR) 15 MG tablet TAKE 1 TABLET BY MOUTH TWICE DAILY AS NEEDED FOR ANXIETY Yes Ronak Sen MD   HYDROcodone-acetaminophen (NORCO) 7.5-325 MG per tablet Take 1 tablet by mouth daily as needed for Pain for up to 30 days. Yes Ronak Sen MD   predniSONE (DELTASONE) 10 MG tablet 40 mg a day and taper by 10 mg every third day to off Yes Gabriella Aguirre MD   dupilumab (DUPIXENT) 300 MG/2ML SOSY injection Inject 2 mLs into the skin every 14 days Yes Gabriella Aguirre MD   vitamin D (ERGOCALCIFEROL) 1.25 MG (91928 UT) CAPS capsule TAKE 1 CAPSULE BY MOUTH 1 TIME WEEKLY Yes Ronak Sen MD   albuterol sulfate HFA (PROVENTIL;VENTOLIN;PROAIR) 108 (90 Base) MCG/ACT inhaler INHALE 2 PUFFS INTO THE LUNGS FOUR TIMES DAILY AS NEEDED FOR WHEEZING Yes Ronak Sen MD   fluticasone-salmeterol (ADVAIR DISKUS) 500-50 MCG/ACT AEPB diskus inhaler Inhale 1 puff into the lungs in the morning and 1 puff in the evening.  Yes Gabriella Aguirre MD   Levocetirizine Dihydrochloride (XYZAL PO) Take by mouth Yes Historical Provider, MD   montelukast (SINGULAIR) 10 MG tablet Take 1 tablet by mouth daily Yes Apple Zapata PA-C   ibandronate (BONIVA) 150 MG tablet Take 1 tablet by mouth every 30 days Take one (1) tablet once per month in the morning with a full glass of water, on an empty stomach, and do not take anything else by mouth or lie down for the next 30 minutes. Yes Eladio Morgan MD   rosuvastatin (CRESTOR) 10 MG tablet TAKE 1 TABLET BY MOUTH EVERY NIGHT AT BEDTIME Yes Eladio Morgan MD   metoprolol succinate (TOPROL XL) 50 MG extended release tablet TAKE 1 TABLET BY MOUTH DAILY Yes Eladio Morgan MD   FLUoxetine (PROZAC) 40 MG capsule TAKE ONE CAPSULE BY MOUTH DAILY Yes Eladio Morgan MD   pantoprazole (PROTONIX) 40 MG tablet TAKE 1 TABLET BY MOUTH EVERY DAY Yes Eladio Morgan MD   rosuvastatin (CRESTOR) 10 MG tablet TAKE 1 TABLET BY MOUTH EVERY NIGHT Yes Eladio Morgan MD   Cholecalciferol (VITAMIN D3) 2000 units CAPS Take by mouth Yes Historical Provider, MD   aspirin 81 MG chewable tablet Take 81 mg by mouth daily Yes Historical Provider, MD        Review of Systems   Constitutional:  Negative for activity change, appetite change, chills, diaphoresis and fatigue. HENT:  Negative for congestion, dental problem, drooling, ear discharge, postnasal drip and rhinorrhea. Eyes:  Negative for visual disturbance. Respiratory:  Positive for cough, shortness of breath and wheezing. Negative for apnea and chest tightness. Gastrointestinal:  Negative for abdominal distention, abdominal pain and anal bleeding. Gastroesophageal reflux, dysphagia   Endocrine: Negative for cold intolerance, heat intolerance and polydipsia. Genitourinary:  Negative for difficulty urinating, dysuria, enuresis and flank pain. Musculoskeletal:  Negative for arthralgias, back pain and gait problem. Allergic/Immunologic: Negative for environmental allergies. Neurological:  Negative for dizziness, facial asymmetry, light-headedness and headaches. Vitals:    12/28/22 1006   BP: 128/72   Pulse: 72   Temp: 97.7 °F (36.5 °C)   SpO2: 95%     BMI Readings from Last 1 Encounters:   12/28/22 28.12 kg/m²         Physical Exam  Vitals reviewed. Constitutional:       Appearance: Normal appearance. HENT:      Head: Normocephalic and atraumatic.       Nose: Nose normal.   Eyes:      Extraocular Movements: Extraocular movements intact. Conjunctiva/sclera: Conjunctivae normal.   Cardiovascular:      Rate and Rhythm: Normal rate and regular rhythm. Heart sounds: No murmur heard. No friction rub. Pulmonary:      Effort: Pulmonary effort is normal. No respiratory distress. Breath sounds: Normal breath sounds. No stridor. No wheezing, rhonchi or rales. Abdominal:      General: There is no distension. Palpations: There is no mass. Tenderness: There is no abdominal tenderness. There is no guarding or rebound. Musculoskeletal:      Cervical back: Normal range of motion and neck supple. Neurological:      Mental Status: She is alert and oriented to person, place, and time. This note was generated using a voice recognition software. Errors in voice recognition may have occurred. An electronic signature was used to authenticate this note.     --Rowdy Quiros MD

## 2022-12-29 ENCOUNTER — TELEPHONE (OUTPATIENT)
Dept: PULMONOLOGY | Age: 62
End: 2022-12-29

## 2022-12-29 NOTE — TELEPHONE ENCOUNTER
Patient's dupixent prescription has been sent to [facility] to be filled

## 2023-01-04 ENCOUNTER — HOSPITAL ENCOUNTER (OUTPATIENT)
Dept: CT IMAGING | Age: 63
Discharge: HOME | End: 2023-01-04
Payer: COMMERCIAL

## 2023-01-04 DIAGNOSIS — R59.0 HILAR ADENOPATHY: ICD-10-CM

## 2023-01-04 PROCEDURE — 71260 CT THORAX DX C+: CPT

## 2023-01-04 PROCEDURE — 71260 CT THORAX DX C+: CPT | Performed by: RADIOLOGY

## 2023-01-04 PROCEDURE — 6360000004 HC RX CONTRAST MEDICATION: Performed by: INTERNAL MEDICINE

## 2023-01-04 RX ADMIN — IOPAMIDOL 70 ML: 755 INJECTION, SOLUTION INTRAVENOUS at 12:27

## 2023-01-17 ENCOUNTER — OFFICE VISIT (OUTPATIENT)
Dept: INTERNAL MEDICINE | Age: 63
End: 2023-01-17

## 2023-01-17 VITALS
HEIGHT: 65 IN | HEART RATE: 74 BPM | WEIGHT: 160 LBS | OXYGEN SATURATION: 96 % | SYSTOLIC BLOOD PRESSURE: 128 MMHG | BODY MASS INDEX: 26.66 KG/M2 | DIASTOLIC BLOOD PRESSURE: 82 MMHG | RESPIRATION RATE: 18 BRPM

## 2023-01-17 DIAGNOSIS — E78.00 PURE HYPERCHOLESTEROLEMIA: ICD-10-CM

## 2023-01-17 DIAGNOSIS — R05.9 COUGH: ICD-10-CM

## 2023-01-17 DIAGNOSIS — R31.29 MICROHEMATURIA: ICD-10-CM

## 2023-01-17 DIAGNOSIS — I10 ESSENTIAL HYPERTENSION: ICD-10-CM

## 2023-01-17 DIAGNOSIS — S22.32XB OPEN FRACTURE OF ONE RIB OF LEFT SIDE, INITIAL ENCOUNTER: ICD-10-CM

## 2023-01-17 DIAGNOSIS — S22.050S: ICD-10-CM

## 2023-01-17 DIAGNOSIS — F41.1 GENERALIZED ANXIETY DISORDER: ICD-10-CM

## 2023-01-17 DIAGNOSIS — E55.9 VITAMIN D DEFICIENCY: ICD-10-CM

## 2023-01-17 DIAGNOSIS — Z12.4 ENCOUNTER FOR PAPANICOLAOU SMEAR FOR CERVICAL CANCER SCREENING: ICD-10-CM

## 2023-01-17 DIAGNOSIS — R80.9 PROTEINURIA, UNSPECIFIED TYPE: ICD-10-CM

## 2023-01-17 DIAGNOSIS — R79.89 ABNORMAL CBC: ICD-10-CM

## 2023-01-17 DIAGNOSIS — Z12.31 ENCOUNTER FOR SCREENING MAMMOGRAM FOR BREAST CANCER: ICD-10-CM

## 2023-01-17 DIAGNOSIS — Z00.00 ANNUAL PHYSICAL EXAM: Primary | ICD-10-CM

## 2023-01-17 DIAGNOSIS — F51.01 PRIMARY INSOMNIA: ICD-10-CM

## 2023-01-17 LAB
ALBUMIN SERPL-MCNC: 3.6 G/DL (ref 3.5–5.2)
ALP BLD-CCNC: 94 U/L (ref 35–104)
ALT SERPL-CCNC: 8 U/L (ref 5–33)
ANION GAP SERPL CALCULATED.3IONS-SCNC: 12 MMOL/L (ref 7–19)
AST SERPL-CCNC: 11 U/L (ref 5–32)
BACTERIA: NEGATIVE /HPF
BASOPHILS ABSOLUTE: 0.1 K/UL (ref 0–0.2)
BASOPHILS RELATIVE PERCENT: 1 % (ref 0–1)
BILIRUB SERPL-MCNC: 0.8 MG/DL (ref 0.2–1.2)
BILIRUBIN URINE: ABNORMAL
BLOOD, URINE: NEGATIVE
BUN BLDV-MCNC: 16 MG/DL (ref 8–23)
CALCIUM SERPL-MCNC: 9 MG/DL (ref 8.8–10.2)
CHLORIDE BLD-SCNC: 105 MMOL/L (ref 98–111)
CHOLESTEROL, TOTAL: 121 MG/DL (ref 160–199)
CLARITY: ABNORMAL
CO2: 24 MMOL/L (ref 22–29)
COLOR: ABNORMAL
CREAT SERPL-MCNC: 0.8 MG/DL (ref 0.5–0.9)
CRYSTALS, UA: ABNORMAL /HPF
EOSINOPHILS ABSOLUTE: 6.96 K/UL (ref 0–0.6)
EOSINOPHILS RELATIVE PERCENT: 47 % (ref 0–5)
EPITHELIAL CELLS, UA: 7 /HPF (ref 0–5)
GFR SERPL CREATININE-BSD FRML MDRD: >60 ML/MIN/{1.73_M2}
GLUCOSE BLD-MCNC: 88 MG/DL (ref 74–109)
GLUCOSE URINE: NEGATIVE MG/DL
HBA1C MFR BLD: 4.8 % (ref 4–6)
HCT VFR BLD CALC: 40.2 % (ref 37–47)
HDLC SERPL-MCNC: 54 MG/DL (ref 65–121)
HEMOGLOBIN: 12.7 G/DL (ref 12–16)
HYALINE CASTS: 13 /HPF (ref 0–8)
IMMATURE GRANULOCYTES #: 0 K/UL
KETONES, URINE: ABNORMAL MG/DL
LDL CHOLESTEROL CALCULATED: 50 MG/DL
LEUKOCYTE ESTERASE, URINE: ABNORMAL
LYMPHOCYTES ABSOLUTE: 1.3 K/UL (ref 1.1–4.5)
LYMPHOCYTES RELATIVE PERCENT: 9 % (ref 20–40)
MCH RBC QN AUTO: 27.9 PG (ref 27–31)
MCHC RBC AUTO-ENTMCNC: 31.6 G/DL (ref 33–37)
MCV RBC AUTO: 88.2 FL (ref 81–99)
MONOCYTES ABSOLUTE: 1.5 K/UL (ref 0–0.9)
MONOCYTES RELATIVE PERCENT: 10 % (ref 0–10)
NEUTROPHILS ABSOLUTE: 4.9 K/UL (ref 1.5–7.5)
NEUTROPHILS RELATIVE PERCENT: 33 % (ref 50–65)
NITRITE, URINE: NEGATIVE
PDW BLD-RTO: 15.3 % (ref 11.5–14.5)
PH UA: 6.5 (ref 5–8)
PLATELET # BLD: 322 K/UL (ref 130–400)
PLATELET SLIDE REVIEW: ADEQUATE
PMV BLD AUTO: 9 FL (ref 9.4–12.3)
POTASSIUM SERPL-SCNC: 4.3 MMOL/L (ref 3.5–5)
PROTEIN UA: 30 MG/DL
RBC # BLD: 4.56 M/UL (ref 4.2–5.4)
RBC # BLD: NORMAL 10*6/UL
RBC UA: 6 /HPF (ref 0–4)
SODIUM BLD-SCNC: 141 MMOL/L (ref 136–145)
SPECIFIC GRAVITY UA: 1.03 (ref 1–1.03)
TOTAL PROTEIN: 7.9 G/DL (ref 6.6–8.7)
TRIGL SERPL-MCNC: 83 MG/DL (ref 0–149)
TSH SERPL DL<=0.05 MIU/L-ACNC: 1.37 UIU/ML (ref 0.27–4.2)
UROBILINOGEN, URINE: 4 E.U./DL
VITAMIN D 25-HYDROXY: 42.8 NG/ML
WBC # BLD: 14.8 K/UL (ref 4.8–10.8)
WBC UA: 5 /HPF (ref 0–5)

## 2023-01-17 ASSESSMENT — PATIENT HEALTH QUESTIONNAIRE - PHQ9
9. THOUGHTS THAT YOU WOULD BE BETTER OFF DEAD, OR OF HURTING YOURSELF: 0
7. TROUBLE CONCENTRATING ON THINGS, SUCH AS READING THE NEWSPAPER OR WATCHING TELEVISION: 0
SUM OF ALL RESPONSES TO PHQ9 QUESTIONS 1 & 2: 2
SUM OF ALL RESPONSES TO PHQ QUESTIONS 1-9: 5
5. POOR APPETITE OR OVEREATING: 1
2. FEELING DOWN, DEPRESSED OR HOPELESS: 1
10. IF YOU CHECKED OFF ANY PROBLEMS, HOW DIFFICULT HAVE THESE PROBLEMS MADE IT FOR YOU TO DO YOUR WORK, TAKE CARE OF THINGS AT HOME, OR GET ALONG WITH OTHER PEOPLE: 0
6. FEELING BAD ABOUT YOURSELF - OR THAT YOU ARE A FAILURE OR HAVE LET YOURSELF OR YOUR FAMILY DOWN: 0
4. FEELING TIRED OR HAVING LITTLE ENERGY: 1
3. TROUBLE FALLING OR STAYING ASLEEP: 1
SUM OF ALL RESPONSES TO PHQ QUESTIONS 1-9: 5
8. MOVING OR SPEAKING SO SLOWLY THAT OTHER PEOPLE COULD HAVE NOTICED. OR THE OPPOSITE, BEING SO FIGETY OR RESTLESS THAT YOU HAVE BEEN MOVING AROUND A LOT MORE THAN USUAL: 0
1. LITTLE INTEREST OR PLEASURE IN DOING THINGS: 1
SUM OF ALL RESPONSES TO PHQ QUESTIONS 1-9: 5
SUM OF ALL RESPONSES TO PHQ QUESTIONS 1-9: 5

## 2023-01-17 ASSESSMENT — ENCOUNTER SYMPTOMS
ABDOMINAL PAIN: 0
CHEST TIGHTNESS: 0
SHORTNESS OF BREATH: 1
SORE THROAT: 0
CONSTIPATION: 0
WHEEZING: 1
COUGH: 1

## 2023-01-17 NOTE — PROGRESS NOTES
Chief Complaint:   Solo Fagan is a 60 y.o. female who presents forcomplete physical exam.    History of Present Illness:      Solo Fagan is a 60 y.o. female who presents todayfor wellness visit AND follow up on her chronic medical conditions as noted below.     Patient Active Problem List    Diagnosis Date Noted    Moderate COPD (chronic obstructive pulmonary disease) ([facility] [address] 11/30/2022     Priority: Medium    Eosinophilic asthma 48/12/2062     Priority: Medium    Elevated IgE level 11/30/2022     Priority: Medium    Gastroesophageal reflux disease without esophagitis 10/03/2022     Priority: Medium    Hiatal hernia 10/03/2022     Priority: Medium    Long COVID 10/03/2022     Priority: Medium    Shortness of breath 08/12/2022     Priority: Medium    Reactive airway disease 06/29/2022     Priority: Medium    Chronic cough 06/29/2022     Priority: Medium    Chronic pain of both knees 08/01/2018    Facial skin lesion 08/01/2018    Right temporal headache 09/21/2017    Vision changes 09/21/2017    Pure hypercholesterolemia 09/10/2017    Vitamin D deficiency 09/10/2017    Essential hypertension 09/10/2017    Localized osteoporosis without current pathological fracture 09/10/2017     2/14 Lspine -2.1/ hi neck -2.6; 1/2017 Lspine -1.8; hip neck -2.6  3/2021 hip neck -2.8; L spine -1.8      Rheumatoid arthritis involving both hands with positive rheumatoid factor ([address] 09/10/2017     Dx 2011 RA/ CCP ne      Primary insomnia 09/10/2017    Generalized anxiety disorder 09/10/2017    Reactive depression 09/10/2017       Past Medical History:   Diagnosis Date    Cough     Depression     Eosinophilic asthma 11/38/3008    Generalized anxiety disorder     GERD (gastroesophageal reflux disease)     Headache     Hyperlipidemia     Hypertension     Insomnia     Moderate COPD (chronic obstructive pulmonary disease) ([address] 11/30/2022    Osteoarthritis     Vitamin D deficiency        Past Surgical History:   Procedure Laterality Date    ADENOIDECTOMY      BREAST BIOPSY      BREAST BIOPSY      COLONOSCOPY      COLONOSCOPY N/A 02/10/2022    Dr Abilio Torres-Questionable lipoma-BCM, 5 yr recall    OVARY REMOVAL Right     TONSILLECTOMY      US THYROID BIOPSY  10/19/2022    US THYROID BIOPSY 10/19/2022 L ULTRASOUND       Current Outpatient Medications   Medication Sig Dispense Refill    pantoprazole (PROTONIX) 40 MG tablet TAKE 1 TABLET BY MOUTH BID. 180 tablet 3    fluticasone (FLONASE) 50 MCG/ACT nasal spray SHAKE LIQUID AND USE 2 SPRAYS IN EACH NOSTRIL DAILY 16 g 2    busPIRone (BUSPAR) 15 MG tablet TAKE 1 TABLET BY MOUTH TWICE DAILY AS NEEDED FOR ANXIETY 60 tablet 0    predniSONE (DELTASONE) 10 MG tablet 40 mg a day and taper by 10 mg every third day to off 35 tablet 0    dupilumab (DUPIXENT) 300 MG/2ML SOSY injection Inject 2 mLs into the skin every 14 days 3.92 mL 11    vitamin D (ERGOCALCIFEROL) 1.25 MG (40246 UT) CAPS capsule TAKE 1 CAPSULE BY MOUTH 1 TIME WEEKLY 4 capsule 3    albuterol sulfate HFA (PROVENTIL;VENTOLIN;PROAIR) 108 (90 Base) MCG/ACT inhaler INHALE 2 PUFFS INTO THE LUNGS FOUR TIMES DAILY AS NEEDED FOR WHEEZING 8.5 g 1    fluticasone-salmeterol (ADVAIR DISKUS) 500-50 MCG/ACT AEPB diskus inhaler Inhale 1 puff into the lungs in the morning and 1 puff in the evening. 60 each 3    Levocetirizine Dihydrochloride (XYZAL PO) Take by mouth      montelukast (SINGULAIR) 10 MG tablet Take 1 tablet by mouth daily 30 tablet 2    ibandronate (BONIVA) 150 MG tablet Take 1 tablet by mouth every 30 days Take one (1) tablet once per month in the morning with a full glass of water, on an empty stomach, and do not take anything else by mouth or lie down for the next 30 minutes.  3 tablet 3    rosuvastatin (CRESTOR) 10 MG tablet TAKE 1 TABLET BY MOUTH EVERY NIGHT AT BEDTIME 90 tablet 1    metoprolol succinate (TOPROL XL) 50 MG extended release tablet TAKE 1 TABLET BY MOUTH DAILY 90 tablet 1    FLUoxetine (PROZAC) 40 MG capsule TAKE ONE CAPSULE BY MOUTH DAILY 90 capsule 1    Cholecalciferol (VITAMIN D3) 2000 units CAPS Take by mouth      aspirin 81 MG chewable tablet Take 81 mg by mouth daily       No current facility-administered medications for this visit.     Allergies   Allergen Reactions    Morphine And Related Headaches       Social History     Socioeconomic History    Marital status:      Spouse name: None    Number of children: 1    Years of education: None    Highest education level: None   Occupational History    Occupation: [employer]    Tobacco Use    Smoking status: Never    Smokeless tobacco: Never   Substance and Sexual Activity    Alcohol use: Yes    Drug use: No    Sexual activity: Never     Social Determinants of Health     Financial Resource Strain: Low Risk     Difficulty of Paying Living Expenses: Not hard at all   Food Insecurity: No Food Insecurity    Worried About Running Out of Food in the Last Year: Never true    Ran Out of Food in the Last Year: Never true     Family History   Problem Relation Age of Onset    Lung Cancer Father     Colon Cancer Sister 58        COLON    Other Sister         PANCREATIC CANCER    Diabetes Brother     Coronary Art Dis Brother           Past Surgical History:   Procedure Laterality Date    ADENOIDECTOMY      BREAST BIOPSY      BREAST BIOPSY      COLONOSCOPY      COLONOSCOPY N/A 02/10/2022    Dr BERNABE Torres-Questionable lipoma-BCM, 5 yr recall    OVARY REMOVAL Right     TONSILLECTOMY      US THYROID BIOPSY  10/19/2022    US THYROID BIOPSY 10/19/2022 [facility] ULTRASOUND         Lab Review   Orders Only on 01/17/2023   Component Date Value    Cholesterol, Total 01/17/2023 121 (A)     Triglycerides 01/17/2023 83     HDL 01/17/2023 54 (A)     LDL Calculated 01/17/2023 50     TSH 01/17/2023 1.370     Color, UA 01/17/2023 DARK YELLOW (A)     Clarity, UA 01/17/2023 CLOUDY (A)     Glucose, Ur 01/17/2023 Negative     Bilirubin Urine 01/17/2023 SMALL (A)     Ketones, Urine 01/17/2023 TRACE (A)      Specific Gravity, UA 01/17/2023 1.027     Blood, Urine 01/17/2023 Negative     pH, UA 01/17/2023 6.5     Protein, UA 01/17/2023 30 (A)     Urobilinogen, Urine 01/17/2023 4.0 (A)     Nitrite, Urine 01/17/2023 Negative     Leukocyte Esterase, Urine 01/17/2023 TRACE (A)     Vit D, 25-Hydroxy 01/17/2023 42.8     Hemoglobin A1C 01/17/2023 4.8     Sodium 01/17/2023 141     Potassium 01/17/2023 4.3     Chloride 01/17/2023 105     CO2 01/17/2023 24     Anion Gap 01/17/2023 12     Glucose 01/17/2023 88     BUN 01/17/2023 16     Creatinine 01/17/2023 0.8     Est, Glom Filt Rate 01/17/2023 >60     Calcium 01/17/2023 9.0     Total Protein 01/17/2023 7.9     Albumin 01/17/2023 3.6     Total Bilirubin 01/17/2023 0.8     Alkaline Phosphatase 01/17/2023 94     ALT 01/17/2023 8     AST 01/17/2023 11     WBC 01/17/2023 14.8 (A)     RBC 01/17/2023 4.56     Hemoglobin 01/17/2023 12.7     Hematocrit 01/17/2023 40.2     MCV 01/17/2023 88.2     MCH 01/17/2023 27.9     MCHC 01/17/2023 31.6 (A)     RDW 01/17/2023 15.3 (A)     Platelets 52/65/8851 322     MPV 01/17/2023 9.0 (A)     Immature Granulocytes # 01/17/2023 0.0     Bacteria, UA 01/17/2023 NEGATIVE (A)     Crystals, UA 01/17/2023 NEG (A)     Hyaline Casts, UA 01/17/2023 13 (A)     WBC, UA 01/17/2023 5     RBC, UA 01/17/2023 6 (A)     Epithelial Cells, UA 01/17/2023 7    Admission on 12/22/2022, Discharged on 12/22/2022   Component Date Value    Troponin 12/22/2022 <0.01     WBC 12/22/2022 8.6     RBC 12/22/2022 4.59     Hemoglobin 12/22/2022 12.8     Hematocrit 12/22/2022 40.4     MCV 12/22/2022 88.0     MCH 12/22/2022 27.9     MCHC 12/22/2022 31.7 (A)     RDW 12/22/2022 16.6 (A)     Platelets 79/12/1101 201     MPV 12/22/2022 8.7 (A)     Neutrophils % 12/22/2022 53.4     Lymphocytes % 12/22/2022 15.1 (A)     Monocytes % 12/22/2022 7.8     Eosinophils % 12/22/2022 22.4 (A)     Basophils % 12/22/2022 0.9     Neutrophils Absolute 12/22/2022 4.6     Immature Granulocytes # 12/22/2022 0.0     Lymphocytes Absolute 12/22/2022 1.3     Monocytes Absolute 12/22/2022 0.70     Eosinophils Absolute 12/22/2022 1.90 (A)     Basophils Absolute 12/22/2022 0.10     Sodium 12/22/2022 139     Potassium reflex Magnesi* 12/22/2022 3.7     Chloride 12/22/2022 104     CO2 12/22/2022 25     Anion Gap 12/22/2022 10     Glucose 12/22/2022 94     BUN 12/22/2022 16     Creatinine 12/22/2022 0.8     Est, Glom Filt Rate 12/22/2022 >60     Calcium 12/22/2022 9.0     Total Protein 12/22/2022 7.1     Albumin 12/22/2022 4.0     Total Bilirubin 12/22/2022 1.1     Alkaline Phosphatase 12/22/2022 133 (A)     ALT 12/22/2022 13     AST 12/22/2022 13     Adenovirus by PCR 12/22/2022 Not Detected     Bordetella parapertussis* 12/22/2022 Not Detected     Bordetella pertussis by * 12/22/2022 Not Detected     Chlamydophilia pneumonia* 12/22/2022 Not Detected     Coronavirus 229E by PCR 12/22/2022 Not Detected     Coronavirus HKU1 by PCR 12/22/2022 Not Detected     Coronavirus NL63 by PCR 12/22/2022 Not Detected     Coronavirus OC43 by PCR 12/22/2022 Not Detected     SARS-CoV-2, PCR 12/22/2022 Not Detected     Human Metapneumovirus by* 12/22/2022 Not Detected     Human Rhinovirus/Enterov* 12/22/2022 Not Detected     Influenza A by PCR 12/22/2022 Not Detected     Influenza B by PCR 12/22/2022 Not Detected     Mycoplasma pneumoniae by* 12/22/2022 Not Detected     Parainfluenza Virus 1 by* 12/22/2022 Not Detected     Parainfluenza Virus 2 by* 12/22/2022 Not Detected     Parainfluenza Virus 3 by* 12/22/2022 Not Detected     Parainfluenza Virus 4 by* 12/22/2022 Not Detected     Respiratory Syncytial Vi* 12/22/2022 Not Detected          Review of Systems   Constitutional:  Positive for fatigue. Negative for chills and fever. HENT:  Negative for congestion, ear pain, nosebleeds, postnasal drip and sore throat. Respiratory:  Positive for cough, shortness of breath and wheezing. Negative for chest tightness.          Chest wall pain Cardiovascular:  Negative for chest pain, palpitations and leg swelling. Gastrointestinal:  Negative for abdominal pain and constipation. Genitourinary:  Negative for dysuria and urgency. Musculoskeletal: Negative. Negative for arthralgias. Skin:  Negative for rash. Neurological:  Negative for dizziness and headaches. Psychiatric/Behavioral: Negative. Vitals:    01/17/23 1238   BP: 128/82   Site: Left Upper Arm   Position: Sitting   Cuff Size: Large Adult   Pulse: 74   Resp: 18   SpO2: 96%   Weight: 160 lb (72.6 kg)   Height: 5' 5\" (1.651 m)      Wt Readings from Last 3 Encounters:   01/17/23 160 lb (72.6 kg)   12/28/22 169 lb (76.7 kg)   12/22/22 170 lb (77.1 kg)   Body mass index is 26.63 kg/m². BP Readings from Last 3 Encounters:   01/17/23 128/82   12/28/22 128/72   12/22/22 (!) 155/90       Physical Exam  Vitals and nursing note reviewed. Constitutional:       Appearance: She is well-developed. HENT:      Head: Normocephalic and atraumatic. Eyes:      Conjunctiva/sclera: Conjunctivae normal.      Pupils: Pupils are equal, round, and reactive to light. Neck:      Thyroid: No thyromegaly. Vascular: No JVD. Cardiovascular:      Rate and Rhythm: Normal rate and regular rhythm. Heart sounds: Normal heart sounds. No murmur heard. Pulmonary:      Effort: Pulmonary effort is normal. No respiratory distress. Breath sounds: Rhonchi and rales present. No wheezing. Chest:      Chest wall: No tenderness. Abdominal:      General: Bowel sounds are normal. There is no distension. Palpations: Abdomen is soft. There is no mass. Tenderness: There is no abdominal tenderness. There is no guarding or rebound. Genitourinary:     Vagina: Normal. No signs of injury. No vaginal discharge. Cervix: No cervical motion tenderness or discharge. Uterus: Not deviated, not enlarged and not tender. Adnexa:         Right: No mass. Left: No mass.   Musculoskeletal:         General: No tenderness or deformity.      Cervical back: Normal range of motion and neck supple.   Lymphadenopathy:      Cervical: No cervical adenopathy.   Skin:     General: Skin is warm and dry.      Coloration: Skin is not pale.      Findings: No erythema or rash.   Neurological:      Mental Status: She is alert and oriented to person, place, and time.      Cranial Nerves: No cranial nerve deficit.      Motor: No abnormal muscle tone.      Deep Tendon Reflexes: Reflexes are normal and symmetric. Reflexes normal.   Psychiatric:         Behavior: Behavior normal.         Thought Content: Thought content normal.         Judgment: Judgment normal.   Breast exam  Bilateral breast exam- symmetric, no nodules, no lymphadenopathy, no nipple discharge            ASSESSMENT/PLAN  ANNUAL PHYSICAL  * cscope 2022- fu 5 yr  * mammogram 3/2021  * PAP 2020  * bone density 3/2021    Essential hypertension-   Readings reviewed  Renal fx nl  rx metoprolol er 50 aily     Pure hypercholesterolemia-   LDL improved at 50  I have personally reviewed and interpreted these lab results and thoroughly discussed with patient  rx crestor 10 mg daily  Healthy, mostly fiber rich nonstarchy plant-based diet recommended  Recommend to decrease intake of processed foods, simple carbohydrates and animal-based products that high in saturated fats        Vitamin D deficiency-    vitamin D level 42  I have personally reviewed and interpreted these lab results and thoroughly discussed with patient  Restart vitamin D 50,000 IU weekly        Generalized anxiety disorder-she lost her mother in July 2020, 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  She is adjusting to new normal  rx fluoxetine daily        Osteoporosis          Localized osteoporosis without current pathological fracture 09/10/2017     2/14 Lspine -2.1/ hi neck -2.6; 1/2017 Lspine -1.8; hip neck -2.6  3/2021 hip neck -2.8; L spine -1.8         Did not take prolia ( was worried  about taking this together with covid injection)        RA/ knee pains  Giving her prescription for hydrocodone for when necessary occasional use only, she usually only fills #30 every 6 months  Ford Bassett was reviewed  On exam today and as per discussions with the patient today there is no evidence of adverse events such as cognitive impairment, sedation, constipation or falls related to prescribed medications. There is also no evidence of aberrant behavior like lost prescriptions or early refill requests or multiple prescribers for controlled substances. Patient  was advised NOT to attempt to drive a motor vehichle or operate any heavy machinery within 6 hrs of taking the presribed medication - hydrocodone     UA 6 rbc and protein  Repeat next week   Orders placed    CBC elevated wbc 14  Repeat in 1 months  orders    Cough  Dyspnea  Seen pulmonology- dr Sandy Mckeon  (following highlighted text has been copied from this specialist note)      1. Severe persistent reactive airway disease without complication  2. Moderate COPD (chronic obstructive pulmonary disease) ([facility] [address]  3. Long COVID  4. Chronic cough  5. Gastroesophageal reflux disease without esophagitis  -     Ambulatory referral to Gastroenterology  -     pantoprazole (PROTONIX) 40 MG tablet; TAKE 1 TABLET BY MOUTH BID., Disp-180 tablet, [id number] refills remain on this prescription. Your patient is requesting advance approval of refills for this medication to [address]  6. Eosinophilic asthma  7. Elevated IgE level  8. Esophageal dysphagia  -     Ambulatory referral to Gastroenterology  9. Hilar adenopathy  -     CT CHEST W CONTRAST; Future    Dupixent started 1/9/2023    Reviewed ct  CT results:  Impression    1. Patchy multifocal airspace disease is noted throughout both lungs,       concerning for multifocal pneumonia. 2.Mediastinal and bilateral hilar lymphadenopathy may be reactive.     3.6 mm nodules within the RIGHT lower lobe and inferior lingula. Recommend       follow-up chest CT in 3-6 months to assess for stability of these nodules as       well as the mediastinal/hilar lymphadenopathy. 4.There is an acute, minimally displaced fracture of the LEFT posterolateral       8th rib. 5.Mild to moderate compression fracture of T5. This appears chronic, although       exact chronicity is uncertain. Please correlate clinically for       pain/tenderness at this site. Results DW pt  States had fall several yrs ago- fell on her upper back - this maybe reason for T5 fracture  Denies pain  Management pulmonary nodules and airspace disease per pulmonology- has appt next week    Was seen er 12/22  CT ++ left 8th rib fracture  Has fu appt with pulmonology  Pain is better  Orders Placed This Encounter   Procedures    ANGUS DIGITAL SCREEN W OR WO CAD BILATERAL    PAP SMEAR    CBC with Auto Differential    Hemoglobin A1C    Comprehensive Metabolic Panel    Lipid Panel    Vitamin D 25 Hydroxy    Urinalysis, Micro    Urinalysis     New Prescriptions    No medications on file      There are no Patient Instructions on file for this visit. Return in about 6 months (around 7/17/2023) for Medication check. EMR Dragon/transcription disclaimer:Significant part of this  encounter note is electronic transcription/translation of spoken language to printed text. The electronic translation of spoken language may beerroneous, or at times, nonsensical words or phrases may be inadvertently transcribed.  Although I have reviewed the note for such errors, some may still exist.

## 2023-01-24 RX ORDER — BUSPIRONE HYDROCHLORIDE 15 MG/1
TABLET ORAL
Qty: 60 TABLET | Refills: 0 | Status: SHIPPED | OUTPATIENT
Start: 2023-01-24

## 2023-01-24 RX ORDER — METOPROLOL SUCCINATE 50 MG/1
TABLET, EXTENDED RELEASE ORAL
Qty: 90 TABLET | Refills: 1 | Status: SHIPPED | OUTPATIENT
Start: 2023-01-24

## 2023-01-24 RX ORDER — FLUOXETINE HYDROCHLORIDE 40 MG/1
CAPSULE ORAL
Qty: 90 CAPSULE | Refills: 1 | Status: SHIPPED | OUTPATIENT
Start: 2023-01-24

## 2023-01-24 RX ORDER — ROSUVASTATIN CALCIUM 10 MG/1
TABLET, COATED ORAL
Qty: 90 TABLET | Refills: 1 | Status: SHIPPED | OUTPATIENT
Start: 2023-01-24

## 2023-01-24 NOTE — TELEPHONE ENCOUNTER
Nupur Gibbs called requesting a refill of the below medication which has been pended for you:     Requested Prescriptions     Pending Prescriptions Disp Refills    rosuvastatin (CRESTOR) 10 MG tablet [Pharmacy Med Name: ROSUVASTATIN 10MG TABLETS] 90 tablet 1     Sig: TAKE 1 TABLET BY MOUTH EVERY NIGHT AT BEDTIME    FLUoxetine (PROZAC) 40 MG capsule [Pharmacy Med Name: FLUOXETINE 40MG CAPSULES] 90 capsule 1     Sig: TAKE 1 CAPSULE BY MOUTH DAILY    metoprolol succinate (TOPROL XL) 50 MG extended release tablet [Pharmacy Med Name: METOPROLOL ER SUCCINATE 50MG TABS] 90 tablet 1     Sig: TAKE 1 TABLET BY MOUTH DAILY    busPIRone (BUSPAR) 15 MG tablet [Pharmacy Med Name: BUSPIRONE 15MG TABLETS] 60 tablet 0     Sig: TAKE 1 TABLET BY MOUTH TWICE DAILY AS NEEDED FOR ANXIETY       Last Appointment Date: 1/17/2023  Next Appointment Date: 7/17/2023    Allergies   Allergen Reactions    Morphine And Related Headaches

## 2023-01-25 ENCOUNTER — OFFICE VISIT (OUTPATIENT)
Dept: GASTROENTEROLOGY | Age: 63
End: 2023-01-25
Payer: COMMERCIAL

## 2023-01-25 ENCOUNTER — OFFICE VISIT (OUTPATIENT)
Dept: PULMONOLOGY | Age: 63
End: 2023-01-25
Payer: COMMERCIAL

## 2023-01-25 VITALS
WEIGHT: 159.2 LBS | HEIGHT: 65 IN | OXYGEN SATURATION: 96 % | HEART RATE: 84 BPM | BODY MASS INDEX: 26.52 KG/M2 | TEMPERATURE: 97.5 F | SYSTOLIC BLOOD PRESSURE: 148 MMHG | DIASTOLIC BLOOD PRESSURE: 82 MMHG

## 2023-01-25 VITALS
SYSTOLIC BLOOD PRESSURE: 139 MMHG | HEART RATE: 79 BPM | HEIGHT: 65 IN | BODY MASS INDEX: 26.16 KG/M2 | OXYGEN SATURATION: 98 % | DIASTOLIC BLOOD PRESSURE: 80 MMHG | WEIGHT: 157 LBS

## 2023-01-25 DIAGNOSIS — K21.9 GASTROESOPHAGEAL REFLUX DISEASE, UNSPECIFIED WHETHER ESOPHAGITIS PRESENT: Primary | ICD-10-CM

## 2023-01-25 DIAGNOSIS — R80.9 PROTEINURIA, UNSPECIFIED TYPE: ICD-10-CM

## 2023-01-25 DIAGNOSIS — R59.0 HILAR ADENOPATHY: ICD-10-CM

## 2023-01-25 DIAGNOSIS — J01.90 ACUTE NON-RECURRENT SINUSITIS, UNSPECIFIED LOCATION: ICD-10-CM

## 2023-01-25 DIAGNOSIS — J45.50 SEVERE PERSISTENT REACTIVE AIRWAY DISEASE WITHOUT COMPLICATION: Primary | ICD-10-CM

## 2023-01-25 DIAGNOSIS — U09.9 LONG COVID: ICD-10-CM

## 2023-01-25 DIAGNOSIS — J44.9 MODERATE COPD (CHRONIC OBSTRUCTIVE PULMONARY DISEASE) (HCC): ICD-10-CM

## 2023-01-25 DIAGNOSIS — R13.10 DYSPHAGIA, UNSPECIFIED TYPE: ICD-10-CM

## 2023-01-25 DIAGNOSIS — R76.8 ELEVATED IGE LEVEL: ICD-10-CM

## 2023-01-25 DIAGNOSIS — R13.19 ESOPHAGEAL DYSPHAGIA: ICD-10-CM

## 2023-01-25 DIAGNOSIS — R31.29 MICROHEMATURIA: ICD-10-CM

## 2023-01-25 LAB
BILIRUBIN URINE: NEGATIVE
BLOOD, URINE: NEGATIVE
CLARITY: CLEAR
COLOR: YELLOW
GLUCOSE URINE: NEGATIVE MG/DL
KETONES, URINE: NEGATIVE MG/DL
LEUKOCYTE ESTERASE, URINE: NEGATIVE
NITRITE, URINE: NEGATIVE
PH UA: 6 (ref 5–8)
PROTEIN UA: NEGATIVE MG/DL
SPECIFIC GRAVITY UA: 1.02 (ref 1–1.03)
UROBILINOGEN, URINE: 2 E.U./DL

## 2023-01-25 PROCEDURE — 99214 OFFICE O/P EST MOD 30 MIN: CPT | Performed by: INTERNAL MEDICINE

## 2023-01-25 PROCEDURE — 3079F DIAST BP 80-89 MM HG: CPT | Performed by: INTERNAL MEDICINE

## 2023-01-25 PROCEDURE — 99204 OFFICE O/P NEW MOD 45 MIN: CPT | Performed by: NURSE PRACTITIONER

## 2023-01-25 PROCEDURE — 3075F SYST BP GE 130 - 139MM HG: CPT | Performed by: NURSE PRACTITIONER

## 2023-01-25 PROCEDURE — 3077F SYST BP >= 140 MM HG: CPT | Performed by: INTERNAL MEDICINE

## 2023-01-25 PROCEDURE — 3079F DIAST BP 80-89 MM HG: CPT | Performed by: NURSE PRACTITIONER

## 2023-01-25 RX ORDER — CEFUROXIME AXETIL 500 MG/1
500 TABLET ORAL 2 TIMES DAILY
Qty: 28 TABLET | Refills: 0 | Status: SHIPPED | OUTPATIENT
Start: 2023-01-25 | End: 2023-02-08

## 2023-01-25 RX ORDER — FLUTICASONE PROPIONATE AND SALMETEROL 500; 50 UG/1; UG/1
1 POWDER RESPIRATORY (INHALATION) EVERY 12 HOURS
Qty: 1 EACH | Refills: 11 | Status: SHIPPED | OUTPATIENT
Start: 2023-01-25

## 2023-01-25 ASSESSMENT — ENCOUNTER SYMPTOMS
BLOOD IN STOOL: 0
VOMITING: 0
ABDOMINAL PAIN: 0
WHEEZING: 1
RHINORRHEA: 0
ANAL BLEEDING: 0
COUGH: 1
ANAL BLEEDING: 0
COUGH: 0
CONSTIPATION: 0
ABDOMINAL PAIN: 0
SHORTNESS OF BREATH: 1
ABDOMINAL DISTENTION: 0
APNEA: 0
TROUBLE SWALLOWING: 0
CHOKING: 0
DIARRHEA: 0
RECTAL PAIN: 0
ABDOMINAL DISTENTION: 0
CHEST TIGHTNESS: 0
SHORTNESS OF BREATH: 0
BACK PAIN: 0
NAUSEA: 0

## 2023-01-25 NOTE — PATIENT INSTRUCTIONS
Upper GI Endoscopy: Before Your Procedure    What is an upper GI endoscopy? An upper gastrointestinal (or GI) endoscopy is a test that allows your doctor to look at the inside of your esophagus, stomach, and the first part of your small intestine, called the duodenum. The esophagus is the tube that carries food to your stomach. The doctor uses a thin, lighted tube that bends. It is called an endoscope, or scope. The doctor puts the tip of the scope in your mouth and gently moves it down your throat. The scope is a flexible video camera. The doctor looks at a monitor (like a TV set or a computer screen) as he or she moves the scope. A doctor may do this procedure to look for ulcers, tumors, infection, or bleeding. It also can be used to look for signs of acid backing up into your esophagus. This is called gastroesophageal reflux disease, or GERD. The doctor can use the scope to take a sample of tissue for study (a biopsy). The doctor also can use the scope to take out growths or stop bleeding. How do you prepare for the procedure? Procedures can be stressful. This information will help you understand what you can expect. And it will help you safely prepare for your procedure. Preparing for the procedure    Do not eat or drink anything for 6 to 8 hours before the test. An empty stomach helps your doctor see your stomach clearly during the test. It also reduces your chances of vomiting. If you vomit, there is a small risk that the vomit could enter your lungs. (This is called aspiration.) If the test is done in an emergency, a tube may be inserted through your nose or mouth to empty your stomach. Do not take sucralfate (Carafate) or antacids on the day of the test. These medicines can make it hard for your doctor to see your upper GI tract. If your doctor tells you to, stop taking iron supplements 7 to 14 days before the test.     Be sure you have someone to take you home.  Anesthesia and pain medicine will make it unsafe for you to drive or get home on your own. Understand exactly what procedure is planned, along with the risks, benefits, and other options. Tell your doctor ALL the medicines, vitamins, supplements, and herbal remedies you take. Some may increase the risk of problems during your procedure. Your doctor will tell you if you should stop taking any of them before the procedure and how soon to do it. If you take a medicine that prevents blood clots, your doctor may tell you to stop taking it before your procedure. Or your doctor may tell you to keep taking it. (These medicines include aspirin and other blood thinners.) Make sure that you understand exactly what your doctor wants you to do. Make sure your doctor and the hospital have a copy of your advance directive. If you don't have one, you may want to prepare one. It lets others know your health care wishes. It's a good thing to have before any type of surgery or procedure. What happens on the day of the procedure? Follow the instructions exactly about when to stop eating and drinking. If you don't, your procedure may be canceled. If your doctor told you to take your medicines on the day of the procedure, take them with only a sip of water. Take a bath or shower before you come in for your procedure. Do not apply lotions, perfumes, deodorants, or nail polish. Take off all jewelry and piercings. And take out contact lenses, if you wear them. At the hospital or surgery center   Bring a picture ID. The test may take 15 to 30 minutes. The doctor may spray medicine on the back of your throat to numb it. You also will get medicine to prevent pain and to relax you. You will lie on your left side. The doctor will put the scope in your mouth and toward the back of your throat. The doctor will tell you when to swallow. This helps the scope move down your throat. You will be able to breathe normally.  The doctor will move the scope down your esophagus into your stomach. The doctor also may look at the duodenum. If your doctor wants to take a sample of tissue for a biopsy, he or she may use small surgical tools, which are put into the scope, to cut off some tissue. You will not feel a biopsy, if one is taken. The doctor also can use the tools to stop bleeding or to do other treatments, if needed. You will stay at the hospital or surgery center for 1 to 2 hours until the medicine you were given wears off. What happens after an upper GI endoscopy? After the test, you may belch and feel bloated for a while. You may have a tickling, dry throat or mouth. You may feel a bit hoarse, and you may have a mild sore throat. These symptoms may last several days. Throat lozenges and warm saltwater gargles can help relieve the throat symptoms. Ask your doctor when you can drive again. Your doctor will tell you when you can go back to your usual diet and activities. Don't drink alcohol for 12 to 24 hours after the test.   When should you call your doctor? You have questions or concerns. You don't understand how to prepare for your procedure. You become ill before the procedure (such as fever, flu, or a cold). You need to reschedule or have changed your mind about having the procedure. Where can you learn more? Go to http://www.woods.com/ and enter P790 to learn more about \"Upper GI Endoscopy: Before Your Procedure. \"  Current as of: June 6, 2022               Content Version: 13.5  © 2006-2022 [employer]. Care instructions adapted under license by [facility] [facility] If you have questions about a medical condition or this instruction, always ask your healthcare professional. [place] [address] any warranty or liability for your use of this information.

## 2023-01-25 NOTE — PROGRESS NOTES
Pulmonary and Sleep Medicine    Oval Dillon (:  1960) is a 60 y.o. female,Established patient, here for evaluation of the following chief complaint(s):  Follow-up (Follow up- Ct Chest )      Referring physician:  No referring provider defined for this encounter. ASSESSMENT/PLAN:  1. Severe persistent reactive airway disease without complication  -     fluticasone-salmeterol (ADVAIR DISKUS) 500-50 MCG/ACT AEPB diskus inhaler; Inhale 1 puff into the lungs in the morning and 1 puff in the evening., Disp-1 each, R-11Normal  2. Moderate COPD (chronic obstructive pulmonary disease) ([address]  3. Long COVID  4. Elevated IgE level  5. Esophageal dysphagia  6. Hilar adenopathy  -     Case Request  7. Acute non-recurrent sinusitis, unspecified location  -     cefUROXime (CEFTIN) 500 MG tablet; Take 1 tablet by mouth 2 times daily for 14 days, Disp-28 tablet, R-0Normal      Constellation of symptoms could be combination of asthma exacerbated by severe reflux and or sarcoidosis as manifested by her hilar and mediastinal adenopathy and parenchymal scarring. Discussed bronchoscopy EBUS and TB NA. The patient is in agreement however she would like to do the procedure next week. Stop aspirin next Friday. Continue current management for reflux. She is scheduled to see gastroenterology today. Continue inhalers. Hold off steroids for now until the procedure. Kathy Castillo MD, [facility]    Return in about 2 weeks (around 2023). SUBJECTIVE/OBJECTIVE:  Patient is here for follow-up on chronic cough, asthma and med/hilar adenopathy. She continues to have cough. She lost weight. CT of the chest showed stable med/hilar LN. she had COVID in 2022. Prior to Visit Medications    Medication Sig Taking?  Authorizing Provider   rosuvastatin (CRESTOR) 10 MG tablet TAKE 1 TABLET BY MOUTH EVERY NIGHT AT BEDTIME Yes Jasbir King MD   FLUoxetine (PROZAC) 40 MG capsule TAKE 1 CAPSULE BY MOUTH DAILY Yes Sae De Oliveira MD   metoprolol succinate (TOPROL XL) 50 MG extended release tablet TAKE 1 TABLET BY MOUTH DAILY Yes Sae De Oliveira MD   busPIRone (BUSPAR) 15 MG tablet TAKE 1 TABLET BY MOUTH TWICE DAILY AS NEEDED FOR ANXIETY Yes Sae De Oliveira MD   pantoprazole (PROTONIX) 40 MG tablet TAKE 1 TABLET BY MOUTH BID. Yes Gabriella Aguirre MD   fluticasone (FLONASE) 50 MCG/ACT nasal spray SHAKE LIQUID AND USE 2 SPRAYS IN EACH NOSTRIL DAILY Yes Sae De Oliveira MD   predniSONE (DELTASONE) 10 MG tablet 40 mg a day and taper by 10 mg every third day to off Yes Gabriella Aguirre MD   dupilumab (DUPIXENT) 300 MG/2ML SOSY injection Inject 2 mLs into the skin every 14 days Yes Gabriella Aguirre MD   vitamin D (ERGOCALCIFEROL) 1.25 MG (63095 UT) CAPS capsule TAKE 1 CAPSULE BY MOUTH 1 TIME WEEKLY Yes Sae De Oliveira MD   albuterol sulfate HFA (PROVENTIL;VENTOLIN;PROAIR) 108 (90 Base) MCG/ACT inhaler INHALE 2 PUFFS INTO THE LUNGS FOUR TIMES DAILY AS NEEDED FOR WHEEZING Yes Sae De Oliveira MD   fluticasone-salmeterol (ADVAIR DISKUS) 500-50 MCG/ACT AEPB diskus inhaler Inhale 1 puff into the lungs in the morning and 1 puff in the evening. Yes Gabriella Aguirre MD   Levocetirizine Dihydrochloride (XYZAL PO) Take by mouth Yes Historical Provider, MD   ibandronate (BONIVA) 150 MG tablet Take 1 tablet by mouth every 30 days Take one (1) tablet once per month in the morning with a full glass of water, on an empty stomach, and do not take anything else by mouth or lie down for the next 30 minutes.  Yes Sae De Oliveira MD   rosuvastatin (CRESTOR) 10 MG tablet TAKE 1 TABLET BY MOUTH EVERY NIGHT Yes Sae De Oliveira MD   Cholecalciferol (VITAMIN D3) 2000 units CAPS Take by mouth Yes Historical Provider, MD   aspirin 81 MG chewable tablet Take 81 mg by mouth daily Yes Historical Provider, MD   montelukast (SINGULAIR) 10 MG tablet Take 1 tablet by mouth daily  Silvina Baird PA-C        Review of Systems   Constitutional: Negative for activity change, appetite change, chills, diaphoresis and fatigue. HENT:  Negative for congestion, dental problem, drooling, ear discharge, postnasal drip and rhinorrhea. Eyes:  Negative for visual disturbance. Respiratory:  Positive for cough, shortness of breath and wheezing. Negative for apnea and chest tightness. Gastrointestinal:  Negative for abdominal distention, abdominal pain and anal bleeding. Gastroesophageal reflux, dysphagia   Endocrine: Negative for cold intolerance, heat intolerance and polydipsia. Genitourinary:  Negative for difficulty urinating, dysuria, enuresis and flank pain. Musculoskeletal:  Negative for arthralgias, back pain and gait problem. Allergic/Immunologic: Negative for environmental allergies. Neurological:  Negative for dizziness, facial asymmetry, light-headedness and headaches. Vitals:    01/25/23 1055   BP: (!) 148/82   Pulse: 84   Temp: 97.5 °F (36.4 °C)   SpO2: 96%     BMI Readings from Last 1 Encounters:   01/25/23 26.49 kg/m²         Physical Exam  Vitals reviewed. Constitutional:       Appearance: Normal appearance. HENT:      Head: Normocephalic and atraumatic. Nose: Nose normal.   Eyes:      Extraocular Movements: Extraocular movements intact. Conjunctiva/sclera: Conjunctivae normal.   Cardiovascular:      Rate and Rhythm: Normal rate and regular rhythm. Heart sounds: No murmur heard. No friction rub. Pulmonary:      Effort: Pulmonary effort is normal. No respiratory distress. Breath sounds: Normal breath sounds. No stridor. No wheezing, rhonchi or rales. Abdominal:      General: There is no distension. Palpations: There is no mass. Tenderness: There is no abdominal tenderness. There is no guarding or rebound. Musculoskeletal:      Cervical back: Normal range of motion and neck supple. Neurological:      Mental Status: She is alert and oriented to person, place, and time.            This note was generated using a voice recognition software. Errors in voice recognition may have occurred. An electronic signature was used to authenticate this note.     --Laurie Moon MD

## 2023-01-25 NOTE — PROGRESS NOTES
Subjective:     Patient ID: Tona Halsted is a 60 y.o. female  PCP: Sol Sultana MD  Referring Provider: Regine NAPOLES  Patient presents to the office today with the following complaints: Gastroesophageal Reflux and Dysphagia  Patient seen in the office today due to complaints of GERD and difficulty swallowing. Reports her reflux is fairly controlled with her Protonix, but she still has some issues. Reports she has the most issues with swallowing meats and breads. Assessment:     1. Gastroesophageal reflux disease, unspecified whether esophagitis present  2. Dysphagia, unspecified type         Plan:   Schedule EGD  Nothing to eat or drink after midnight. No driving for 24 hours after procedure. Bring a  to procedure. No aspirin, NSAIDs, fish oil 5 days before procedure. I have discussed the benefits, alternatives, and risks (including bleeding, perforation and death)  for pursuing Endoscopy (EGD/Colonscopy/EUS/ERCP) with the patient and they are willing to continue. We also discussed the need for anesthesia, IV access, proper dietary changes, medication changes if necessary, and need for bowel prep (if ordered) prior to their Endoscopic procedure. They are aware they must have someone accompany them to their scheduled procedure to drive them home - they agree to the above and are willing to continue. Orders  No orders of the defined types were placed in this encounter. Medications  No orders of the defined types were placed in this encounter.         Patient History:     Past Medical History:   Diagnosis Date    Cough     Depression     Eosinophilic asthma 26/44/2147    Generalized anxiety disorder     GERD (gastroesophageal reflux disease)     Headache     Hyperlipidemia     Hypertension     Insomnia     Moderate [facility]ch[facility] 11/30/2022    Osteoarthritis     Vitamin D deficiency        Past Surgical History:   Procedure Laterality Date    ADENOIDECTOMY      BREAST BIOPSY      BREAST BIOPSY      COLONOSCOPY  05/14/2015    Dr Manning-Diverticulosis, 5 yr recall    COLONOSCOPY N/A 02/10/2022    Dr Ozzy Cortez lipoma-BCM, 5 yr recall    OVARY REMOVAL Right     TONSILLECTOMY      US THYROID BIOPSY  10/19/2022    US THYROID BIOPSY 10/19/2022 [facility] ULTRASOUND       Family History   Problem Relation Age of Onset    Lung Cancer Father     Colon Cancer Sister 62        COLON    Pancreatic Cancer Sister     Diabetes Brother     Coronary Art Dis Brother        Social History     Socioeconomic History    Marital status:     Number of children: 1   Occupational History    Occupation: [employer]    Tobacco Use    Smoking status: Never    Smokeless tobacco: Never   Substance and Sexual Activity    Alcohol use: Yes     Comment: occ    Drug use: No    Sexual activity: Never     Social Determinants of Health     Financial Resource Strain: Low Risk     Difficulty of Paying Living Expenses: Not hard at all   Food Insecurity: No Food Insecurity    Worried About Running Out of Food in the Last Year: Never true    [address] in the Last Year: Never true       Current Outpatient Medications   Medication Sig Dispense Refill    fluticasone-salmeterol (ADVAIR DISKUS) 500-50 MCG/ACT AEPB diskus inhaler Inhale 1 puff into the lungs in the morning and 1 puff in the evening.  1 each 11    cefUROXime (CEFTIN) 500 MG tablet Take 1 tablet by mouth 2 times daily for 14 days 28 tablet 0    rosuvastatin (CRESTOR) 10 MG tablet TAKE 1 TABLET BY MOUTH EVERY NIGHT AT BEDTIME 90 tablet 1    FLUoxetine (PROZAC) 40 MG capsule TAKE 1 CAPSULE BY MOUTH DAILY 90 capsule 1    metoprolol succinate (TOPROL XL) 50 MG extended release tablet TAKE 1 TABLET BY MOUTH DAILY 90 tablet 1    busPIRone (BUSPAR) 15 MG tablet TAKE 1 TABLET BY MOUTH TWICE DAILY AS NEEDED FOR ANXIETY 60 tablet 0    pantoprazole (PROTONIX) 40 MG tablet TAKE 1 TABLET BY MOUTH BID. 180 tablet 3 fluticasone (FLONASE) 50 MCG/ACT nasal spray SHAKE LIQUID AND USE 2 SPRAYS IN EACH NOSTRIL DAILY 16 g 2    predniSONE (DELTASONE) 10 MG tablet 40 mg a day and taper by 10 mg every third day to off 35 tablet 0    dupilumab (DUPIXENT) 300 MG/2ML SOSY injection Inject 2 mLs into the skin every 14 days 3.92 mL 11    vitamin D (ERGOCALCIFEROL) 1.25 MG (82750 UT) CAPS capsule TAKE 1 CAPSULE BY MOUTH 1 TIME WEEKLY 4 capsule 3    albuterol sulfate HFA (PROVENTIL;VENTOLIN;PROAIR) 108 (90 Base) MCG/ACT inhaler INHALE 2 PUFFS INTO THE LUNGS FOUR TIMES DAILY AS NEEDED FOR WHEEZING 8.5 g 1    Levocetirizine Dihydrochloride (XYZAL PO) Take by mouth      montelukast (SINGULAIR) 10 MG tablet Take 1 tablet by mouth daily 30 tablet 2    ibandronate (BONIVA) 150 MG tablet Take 1 tablet by mouth every 30 days Take one (1) tablet once per month in the morning with a full glass of water, on an empty stomach, and do not take anything else by mouth or lie down for the next 30 minutes. 3 tablet 3    Cholecalciferol (VITAMIN D3) 2000 units CAPS Take by mouth      aspirin 81 MG chewable tablet Take 81 mg by mouth daily       No current facility-administered medications for this visit. Allergies   Allergen Reactions    Morphine And Related Headaches       Review of Systems   Constitutional:  Negative for activity change, appetite change, fatigue, fever and unexpected weight change. HENT:  Negative for trouble swallowing. Respiratory:  Negative for cough, choking and shortness of breath. Cardiovascular:  Negative for chest pain. Gastrointestinal:  Negative for abdominal distention, abdominal pain, anal bleeding, blood in stool, constipation, diarrhea, nausea, rectal pain and vomiting. Allergic/Immunologic: Negative for food allergies. All other systems reviewed and are negative.       Objective:     /80 (Site: Left Upper Arm)   Pulse 79   Ht 5' 5\" (1.651 m)   Wt 157 lb (71.2 kg)   SpO2 98%   BMI 26.13 kg/m² Physical Exam  Vitals reviewed. Constitutional:       General: She is not in acute distress. Appearance: She is well-developed. HENT:      Head: Normocephalic and atraumatic. Right Ear: External ear normal.      Left Ear: External ear normal.      Nose: Nose normal.   Eyes:      General: No scleral icterus. Right eye: No discharge. Left eye: No discharge. Conjunctiva/sclera: Conjunctivae normal.      Pupils: Pupils are equal, round, and reactive to light. Cardiovascular:      Rate and Rhythm: Normal rate and regular rhythm. Heart sounds: Normal heart sounds. No murmur heard. Pulmonary:      Effort: Pulmonary effort is normal. No respiratory distress. Breath sounds: Normal breath sounds. No wheezing or rales. Abdominal:      General: Bowel sounds are normal. There is no distension. Palpations: Abdomen is soft. There is no mass. Tenderness: There is no abdominal tenderness. There is no guarding or rebound. Musculoskeletal:         General: Normal range of motion. Cervical back: Normal range of motion and neck supple. Skin:     General: Skin is warm and dry. Coloration: Skin is not pale. Neurological:      Mental Status: She is alert and oriented to person, place, and time.    Psychiatric:         Behavior: Behavior normal.

## 2023-01-31 DIAGNOSIS — R59.0 HILAR ADENOPATHY: Primary | ICD-10-CM

## 2023-02-01 ENCOUNTER — TELEPHONE (OUTPATIENT)
Dept: PULMONOLOGY | Age: 63
End: 2023-02-01

## 2023-02-01 ENCOUNTER — ANESTHESIA EVENT (OUTPATIENT)
Dept: ENDOSCOPY | Age: 63
End: 2023-02-01
Payer: COMMERCIAL

## 2023-02-01 DIAGNOSIS — R59.0 HILAR ADENOPATHY: ICD-10-CM

## 2023-02-01 LAB
BASOPHILS ABSOLUTE: 0 K/UL (ref 0–0.2)
BASOPHILS RELATIVE PERCENT: 0 % (ref 0–1)
EOSINOPHILS ABSOLUTE: 8.05 K/UL (ref 0–0.6)
EOSINOPHILS RELATIVE PERCENT: 50 % (ref 0–5)
HCT VFR BLD CALC: 39.8 % (ref 37–47)
HEMOGLOBIN: 11.9 G/DL (ref 12–16)
IMMATURE GRANULOCYTES #: 0 K/UL
INR BLD: 1.06 (ref 0.88–1.18)
LYMPHOCYTES ABSOLUTE: 1.8 K/UL (ref 1.1–4.5)
LYMPHOCYTES RELATIVE PERCENT: 11 % (ref 20–40)
MCH RBC QN AUTO: 26.2 PG (ref 27–31)
MCHC RBC AUTO-ENTMCNC: 29.9 G/DL (ref 33–37)
MCV RBC AUTO: 87.5 FL (ref 81–99)
MONOCYTES ABSOLUTE: 0.6 K/UL (ref 0–0.9)
MONOCYTES RELATIVE PERCENT: 4 % (ref 0–10)
NEUTROPHILS ABSOLUTE: 5.6 K/UL (ref 1.5–7.5)
NEUTROPHILS RELATIVE PERCENT: 35 % (ref 50–65)
PDW BLD-RTO: 14.8 % (ref 11.5–14.5)
PLATELET # BLD: 341 K/UL (ref 130–400)
PLATELET SLIDE REVIEW: ADEQUATE
PMV BLD AUTO: 8.6 FL (ref 9.4–12.3)
PROTHROMBIN TIME: 13.7 SEC (ref 12–14.6)
RBC # BLD: 4.55 M/UL (ref 4.2–5.4)
RBC # BLD: NORMAL 10*6/UL
SARS-COV-2, PCR: NOT DETECTED
WBC # BLD: 16.1 K/UL (ref 4.8–10.8)

## 2023-02-02 ENCOUNTER — APPOINTMENT (OUTPATIENT)
Dept: GENERAL RADIOLOGY | Age: 63
End: 2023-02-02
Attending: INTERNAL MEDICINE
Payer: COMMERCIAL

## 2023-02-02 ENCOUNTER — ANESTHESIA (OUTPATIENT)
Dept: ENDOSCOPY | Age: 63
End: 2023-02-02
Payer: COMMERCIAL

## 2023-02-02 ENCOUNTER — PREP FOR PROCEDURE (OUTPATIENT)
Dept: PULMONOLOGY | Age: 63
End: 2023-02-02

## 2023-02-02 ENCOUNTER — HOSPITAL ENCOUNTER (OUTPATIENT)
Age: 63
Setting detail: OUTPATIENT SURGERY
Discharge: HOME | End: 2023-02-02
Attending: INTERNAL MEDICINE | Admitting: INTERNAL MEDICINE
Payer: COMMERCIAL

## 2023-02-02 VITALS
HEART RATE: 97 BPM | RESPIRATION RATE: 16 BRPM | OXYGEN SATURATION: 95 % | SYSTOLIC BLOOD PRESSURE: 133 MMHG | DIASTOLIC BLOOD PRESSURE: 68 MMHG | HEIGHT: 65 IN | TEMPERATURE: 97.3 F | WEIGHT: 159 LBS | BODY MASS INDEX: 26.49 KG/M2

## 2023-02-02 DIAGNOSIS — J82.83 EOSINOPHILIC ASTHMA: Primary | ICD-10-CM

## 2023-02-02 DIAGNOSIS — R59.0 MEDIASTINAL ADENOPATHY: ICD-10-CM

## 2023-02-02 LAB — KOH PREP: NORMAL

## 2023-02-02 PROCEDURE — 87206 SMEAR FLUORESCENT/ACID STAI: CPT

## 2023-02-02 PROCEDURE — 2709999900 HC NON-CHARGEABLE SUPPLY: Performed by: INTERNAL MEDICINE

## 2023-02-02 PROCEDURE — 31625 BRONCHOSCOPY W/BIOPSY(S): CPT | Performed by: INTERNAL MEDICINE

## 2023-02-02 PROCEDURE — 7100000011 HC PHASE II RECOVERY - ADDTL 15 MIN: Performed by: INTERNAL MEDICINE

## 2023-02-02 PROCEDURE — 7100000000 HC PACU RECOVERY - FIRST 15 MIN: Performed by: INTERNAL MEDICINE

## 2023-02-02 PROCEDURE — 2580000003 HC RX 258: Performed by: INTERNAL MEDICINE

## 2023-02-02 PROCEDURE — 94640 AIRWAY INHALATION TREATMENT: CPT

## 2023-02-02 PROCEDURE — 6370000000 HC RX 637 (ALT 250 FOR IP)

## 2023-02-02 PROCEDURE — 6360000002 HC RX W HCPCS: Performed by: NURSE ANESTHETIST, CERTIFIED REGISTERED

## 2023-02-02 PROCEDURE — 6370000000 HC RX 637 (ALT 250 FOR IP): Performed by: INTERNAL MEDICINE

## 2023-02-02 PROCEDURE — 87205 SMEAR GRAM STAIN: CPT

## 2023-02-02 PROCEDURE — 7100000010 HC PHASE II RECOVERY - FIRST 15 MIN: Performed by: INTERNAL MEDICINE

## 2023-02-02 PROCEDURE — 3700000000 HC ANESTHESIA ATTENDED CARE: Performed by: INTERNAL MEDICINE

## 2023-02-02 PROCEDURE — 88173 CYTOPATH EVAL FNA REPORT: CPT

## 2023-02-02 PROCEDURE — 87102 FUNGUS ISOLATION CULTURE: CPT

## 2023-02-02 PROCEDURE — 2500000003 HC RX 250 WO HCPCS: Performed by: NURSE ANESTHETIST, CERTIFIED REGISTERED

## 2023-02-02 PROCEDURE — 7100000001 HC PACU RECOVERY - ADDTL 15 MIN: Performed by: INTERNAL MEDICINE

## 2023-02-02 PROCEDURE — 88305 TISSUE EXAM BY PATHOLOGIST: CPT

## 2023-02-02 PROCEDURE — 3700000001 HC ADD 15 MINUTES (ANESTHESIA): Performed by: INTERNAL MEDICINE

## 2023-02-02 PROCEDURE — 87305 ASPERGILLUS AG IA: CPT

## 2023-02-02 PROCEDURE — 3603165200 HC BRNCHSC EBUS GUIDED SAMPL 1/2 NODE STATION/STRUX: Performed by: INTERNAL MEDICINE

## 2023-02-02 PROCEDURE — 31652 BRONCH EBUS SAMPLNG 1/2 NODE: CPT | Performed by: INTERNAL MEDICINE

## 2023-02-02 PROCEDURE — 87070 CULTURE OTHR SPECIMN AEROBIC: CPT

## 2023-02-02 PROCEDURE — 2720000010 HC SURG SUPPLY STERILE: Performed by: INTERNAL MEDICINE

## 2023-02-02 PROCEDURE — 31624 DX BRONCHOSCOPE/LAVAGE: CPT | Performed by: INTERNAL MEDICINE

## 2023-02-02 PROCEDURE — 87116 MYCOBACTERIA CULTURE: CPT

## 2023-02-02 RX ORDER — IPRATROPIUM BROMIDE AND ALBUTEROL SULFATE 2.5; .5 MG/3ML; MG/3ML
SOLUTION RESPIRATORY (INHALATION)
Status: COMPLETED
Start: 2023-02-02 | End: 2023-02-02

## 2023-02-02 RX ORDER — PROPOFOL 10 MG/ML
INJECTION, EMULSION INTRAVENOUS PRN
Status: DISCONTINUED | OUTPATIENT
Start: 2023-02-02 | End: 2023-02-02 | Stop reason: SDUPTHER

## 2023-02-02 RX ORDER — ROCURONIUM BROMIDE 10 MG/ML
INJECTION, SOLUTION INTRAVENOUS PRN
Status: DISCONTINUED | OUTPATIENT
Start: 2023-02-02 | End: 2023-02-02 | Stop reason: SDUPTHER

## 2023-02-02 RX ORDER — SODIUM CHLORIDE, SODIUM LACTATE, POTASSIUM CHLORIDE, CALCIUM CHLORIDE 600; 310; 30; 20 MG/100ML; MG/100ML; MG/100ML; MG/100ML
INJECTION, SOLUTION INTRAVENOUS CONTINUOUS
Status: DISCONTINUED | OUTPATIENT
Start: 2023-02-02 | End: 2023-02-02 | Stop reason: HOSPADM

## 2023-02-02 RX ORDER — IPRATROPIUM BROMIDE AND ALBUTEROL SULFATE 2.5; .5 MG/3ML; MG/3ML
1 SOLUTION RESPIRATORY (INHALATION) ONCE
Status: COMPLETED | OUTPATIENT
Start: 2023-02-02 | End: 2023-02-02

## 2023-02-02 RX ORDER — PREDNISONE 10 MG/1
TABLET ORAL
Qty: 35 TABLET | Refills: 0 | Status: SHIPPED | OUTPATIENT
Start: 2023-02-02

## 2023-02-02 RX ORDER — IPRATROPIUM BROMIDE AND ALBUTEROL SULFATE 2.5; .5 MG/3ML; MG/3ML
1 SOLUTION RESPIRATORY (INHALATION)
Status: DISCONTINUED | OUTPATIENT
Start: 2023-02-02 | End: 2023-02-02 | Stop reason: HOSPADM

## 2023-02-02 RX ORDER — LIDOCAINE HYDROCHLORIDE 10 MG/ML
INJECTION, SOLUTION EPIDURAL; INFILTRATION; INTRACAUDAL; PERINEURAL PRN
Status: DISCONTINUED | OUTPATIENT
Start: 2023-02-02 | End: 2023-02-02 | Stop reason: SDUPTHER

## 2023-02-02 RX ADMIN — ROCURONIUM BROMIDE 3 MG: 10 INJECTION, SOLUTION INTRAVENOUS at 08:09

## 2023-02-02 RX ADMIN — IPRATROPIUM BROMIDE AND ALBUTEROL SULFATE 1 AMPULE: .5; 3 SOLUTION RESPIRATORY (INHALATION) at 09:44

## 2023-02-02 RX ADMIN — PROPOFOL 100 MG: 10 INJECTION, EMULSION INTRAVENOUS at 08:10

## 2023-02-02 RX ADMIN — SODIUM CHLORIDE, SODIUM LACTATE, POTASSIUM CHLORIDE, AND CALCIUM CHLORIDE: 600; 310; 30; 20 INJECTION, SOLUTION INTRAVENOUS at 07:44

## 2023-02-02 RX ADMIN — LIDOCAINE HYDROCHLORIDE 100 MG: 10 INJECTION, SOLUTION EPIDURAL; INFILTRATION; INTRACAUDAL; PERINEURAL at 08:09

## 2023-02-02 RX ADMIN — SUGAMMADEX 300 MG: 100 INJECTION, SOLUTION INTRAVENOUS at 09:03

## 2023-02-02 RX ADMIN — IPRATROPIUM BROMIDE AND ALBUTEROL SULFATE 1 AMPULE: .5; 3 SOLUTION RESPIRATORY (INHALATION) at 07:24

## 2023-02-02 RX ADMIN — PROPOFOL 100 MG: 10 INJECTION, EMULSION INTRAVENOUS at 08:11

## 2023-02-02 RX ADMIN — IPRATROPIUM BROMIDE AND ALBUTEROL SULFATE 1 AMPULE: 2.5; .5 SOLUTION RESPIRATORY (INHALATION) at 09:44

## 2023-02-02 ASSESSMENT — PAIN SCALES - GENERAL: PAINLEVEL_OUTOF10: 3

## 2023-02-02 ASSESSMENT — ENCOUNTER SYMPTOMS: SHORTNESS OF BREATH: 1

## 2023-02-02 ASSESSMENT — PAIN - FUNCTIONAL ASSESSMENT: PAIN_FUNCTIONAL_ASSESSMENT: 0-10

## 2023-02-02 NOTE — ANESTHESIA POSTPROCEDURE EVALUATION
Department of Anesthesiology  Postprocedure Note    Patient: Kristopher Mobley  MRN: [id number]  YOB: 1960  Date of evaluation: 2/2/2023      Procedure Summary     Date: 02/02/23 Room / Location: [address]    Anesthesia Start: 5631 Anesthesia Stop: 0930    Procedure: BRONCHOSCOPY ENDOBRONCHIAL ULTRASOUND (Chest) Diagnosis:       Mediastinal adenopathy      (Mediastinal adenopathy [R59.0])    Surgeons: Marcel Chavez MD Responsible Provider: RENNY Melgoza CRNA    Anesthesia Type: general ASA Status: 3          Anesthesia Type: No value filed.     Jennifer Phase I: Jennifer Score: 10    Jennifer Phase II:        Anesthesia Post Evaluation    Patient location during evaluation: PACU  Patient participation: complete - patient participated  Level of consciousness: awake and responsive to verbal stimuli  Pain score: 0  Airway patency: patent  Nausea & Vomiting: no nausea and no vomiting  Complications: no  Cardiovascular status: blood pressure returned to baseline  Respiratory status: acceptable, airway suctioned and spontaneous ventilation  Hydration status: euvolemic

## 2023-02-02 NOTE — TELEPHONE ENCOUNTER
Patient is not scheduled to have a PFT with us.  She was scheduled to have a procedure this morning, but was not due to have a COVID test.

## 2023-02-02 NOTE — ANESTHESIA PRE PROCEDURE
Department of Anesthesiology  Preprocedure Note       Name:  Breanna Bland   Age:  60 y.o.  :  1960                                          MRN:  [id number]         Date:  2023      Surgeon: Jasen Herrera): Alex Perry MD    Procedure: Procedure(s):  BRONCHOSCOPY ENDOBRONCHIAL ULTRASOUND    Medications prior to admission:   Prior to Admission medications    Medication Sig Start Date End Date Taking? Authorizing Provider   fluticasone-salmeterol (ADVAIR DISKUS) 500-50 MCG/ACT AEPB diskus inhaler Inhale 1 puff into the lungs in the morning and 1 puff in the evening.  23   Gabriella Aguirre MD   cefUROXime (CEFTIN) 500 MG tablet Take 1 tablet by mouth 2 times daily for 14 days 23  Gabriella Aguirre MD   rosuvastatin (CRESTOR) 10 MG tablet TAKE 1 TABLET BY MOUTH EVERY NIGHT AT BEDTIME 23   Naima Horton MD   FLUoxetine (PROZAC) 40 MG capsule TAKE 1 CAPSULE BY MOUTH DAILY 23   Naima Horton MD   metoprolol succinate (TOPROL XL) 50 MG extended release tablet TAKE 1 TABLET BY MOUTH DAILY 23   Naima Horton MD   busPIRone (BUSPAR) 15 MG tablet TAKE 1 TABLET BY MOUTH TWICE DAILY AS NEEDED FOR ANXIETY 23   Naima Horton MD   pantoprazole (PROTONIX) 40 MG tablet TAKE 1 TABLET BY MOUTH BID. 22   Gabriella Aguirre MD   fluticasone (FLONASE) 50 MCG/ACT nasal spray SHAKE LIQUID AND USE 2 SPRAYS IN EACH NOSTRIL DAILY 22   Naima Horton MD   predniSONE (DELTASONE) 10 MG tablet 40 mg a day and taper by 10 mg every third day to off 22   Gabriella Aguirre MD   dupilumab (DUPIXENT) 300 MG/2ML SOSY injection Inject 2 mLs into the skin every 14 days 22   Gabriella Aguirre MD   vitamin D (ERGOCALCIFEROL) 1.25 MG (04586 UT) CAPS capsule TAKE 1 CAPSULE BY MOUTH 1 TIME WEEKLY 11/15/22   Naima Horton MD   albuterol sulfate HFA (PROVENTIL;VENTOLIN;PROAIR) 108 (90 Base) MCG/ACT inhaler INHALE 2 PUFFS INTO THE LUNGS FOUR TIMES DAILY AS NEEDED FOR WHEEZING 11/10/22   Lida Lebron MD   Levocetirizine Dihydrochloride (XYZAL PO) Take by mouth    Historical Provider, MD   montelukast (SINGULAIR) 10 MG tablet Take 1 tablet by mouth daily 6/29/22   Jessenia Hernandez PA-C   ibandronate (BONIVA) 150 MG tablet Take 1 tablet by mouth every 30 days Take one (1) tablet once per month in the morning with a full glass of water, on an empty stomach, and do not take anything else by mouth or lie down for the next 30 minutes. 6/13/22   Lida Lebron MD   rosuvastatin (CRESTOR) 10 MG tablet TAKE 1 TABLET BY MOUTH EVERY NIGHT 6/7/21   Lida Lebron MD   Cholecalciferol (VITAMIN D3) 2000 units CAPS Take by mouth    Historical Provider, MD   aspirin 81 MG chewable tablet Take 81 mg by mouth daily    Historical Provider, MD       Current medications:    Current Facility-Administered Medications   Medication Dose Route Frequency Provider Last Rate Last Admin    lactated ringers IV soln infusion   IntraVENous Continuous Gabriella Aguirre MD         Facility-Administered Medications Ordered in Other Encounters   Medication Dose Route Frequency Provider Last Rate Last Admin    ipratropium-albuterol (DUONEB) 0.5-2.5 (3) MG/3ML nebulizer solution                Allergies:     Allergies   Allergen Reactions    Morphine And Related Headaches       Problem List:    Patient Active Problem List   Diagnosis Code    Pure hypercholesterolemia E78.00    Vitamin D deficiency E55.9    Essential hypertension I10    Localized osteoporosis without current pathological fracture M81.6    Rheumatoid arthritis involving both hands with positive rheumatoid factor (HCC) M05.741, M05.742    Primary insomnia F51.01    Generalized anxiety disorder F41.1    Reactive depression F32.9    Right temporal headache R51.9    Vision changes H53.9    Chronic pain of both knees M25.561, M25.562, G89.29    Facial skin lesion L98.9    Reactive airway disease J45.909    Chronic cough R05.3  Shortness of breath R06.02    Gastroesophageal reflux disease without esophagitis K21.9    Hiatal hernia K44.9    Long COVID U09.9    Moderate COPD (chronic obstructive pulmonary disease) ([facility] J19.5    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    Elevated IgE level R76.8    Esophageal dysphagia R13.19    Hilar adenopathy R59.0       Past Medical History:        Diagnosis Date    Cough     Depression     Eosinophilic asthma 21/52/3277    Generalized anxiety disorder     GERD (gastroesophageal reflux disease)     Headache     Hyperlipidemia     Hypertension     Insomnia     Moderate COPD (chronic obstructive pulmonary disease) ([facility]) 11/30/2022    Osteoarthritis     Rheumatoid arthritis ([facility]     Vitamin D deficiency        Past Surgical History:        Procedure Laterality Date    ADENOIDECTOMY      BREAST BIOPSY      BREAST BIOPSY      COLONOSCOPY  05/14/2015    Dr Manning-Diverticulosis, 5 yr recall    COLONOSCOPY N/A 02/10/2022    Dr Corie Nicholas lipoma-BCM, 5 yr recall    OVARY REMOVAL Right     TONSILLECTOMY      US THYROID BIOPSY  10/19/2022    US THYROID BIOPSY 10/19/2022 [facility] ULTRASOUND       Social History:    Social History     Tobacco Use    Smoking status: Never    Smokeless tobacco: Never   Substance Use Topics    Alcohol use: Yes     Comment: occ                                Counseling given: Not Answered      Vital Signs (Current):   Vitals:    02/02/23 0717   BP: 137/89   Pulse: 73   Resp: 18   Temp: 97.3 °F (36.3 °C)   TempSrc: Temporal   SpO2: 94%   Weight: 159 lb (72.1 kg)   Height: 5' 5\" (1.651 m)                                              BP Readings from Last 3 Encounters:   02/02/23 137/89   01/25/23 139/80   01/25/23 (!) 148/82       NPO Status: Time of last liquid consumption: 2000                        Time of last solid consumption: 2000                        Date of last liquid consumption: 02/01/23                        Date of last solid food consumption: 02/01/23    BMI:   Wt Readings from Last 3 Encounters:   02/02/23 159 lb (72.1 kg)   01/25/23 157 lb (71.2 kg)   01/25/23 159 lb 3.2 oz (72.2 kg)     Body mass index is 26.46 kg/m². CBC:   Lab Results   Component Value Date/Time    WBC 16.1 02/01/2023 12:41 PM    RBC 4.55 02/01/2023 12:41 PM    HGB 11.9 02/01/2023 12:41 PM    HCT 39.8 02/01/2023 12:41 PM    MCV 87.5 02/01/2023 12:41 PM    RDW 14.8 02/01/2023 12:41 PM     02/01/2023 12:41 PM       CMP:   Lab Results   Component Value Date/Time     01/17/2023 07:05 AM     08/02/2011 08:31 AM    K 4.3 01/17/2023 07:05 AM    K 3.7 12/22/2022 05:07 PM    K 4.2 08/02/2011 08:31 AM     01/17/2023 07:05 AM     08/02/2011 08:31 AM    CO2 24 01/17/2023 07:05 AM    BUN 16 01/17/2023 07:05 AM    CREATININE 0.8 01/17/2023 07:05 AM    CREATININE 0.9 08/02/2011 08:31 AM    GFRAA >59 06/13/2022 08:27 AM    LABGLOM >60 01/17/2023 07:05 AM    GLUCOSE 88 01/17/2023 07:05 AM    PROT 7.9 01/17/2023 07:05 AM    PROT 6.7 08/02/2011 08:31 AM    CALCIUM 9.0 01/17/2023 07:05 AM    BILITOT 0.8 01/17/2023 07:05 AM    ALKPHOS 94 01/17/2023 07:05 AM    ALKPHOS 153 08/02/2011 08:31 AM    AST 11 01/17/2023 07:05 AM    ALT 8 01/17/2023 07:05 AM       POC Tests: No results for input(s): POCGLU, POCNA, POCK, POCCL, POCBUN, POCHEMO, POCHCT in the last 72 hours.     Coags:   Lab Results   Component Value Date/Time    PROTIME 13.7 02/01/2023 12:41 PM    INR 1.06 02/01/2023 12:41 PM       HCG (If Applicable): No results found for: PREGTESTUR, PREGSERUM, HCG, HCGQUANT     ABGs: No results found for: PHART, PO2ART, MVZ7ZYL, DYE9XGF, BEART, J7HTXFBU     Type & Screen (If Applicable):  No results found for: LABABO, LABRH    Drug/Infectious Status (If Applicable):  No results found for: HIV, HEPCAB    COVID-19 Screening (If Applicable):   Lab Results   Component Value Date/Time    COVID19 Not Detected 02/01/2023 02:00 PM           Anesthesia Evaluation  Patient summary reviewed and Nursing notes reviewed  Airway: Mallampati: II  TM distance: >3 FB   Neck ROM: full  Mouth opening: > = 3 FB   Dental: normal exam         Pulmonary:normal exam  breath sounds clear to auscultation  (+) COPD:  shortness of breath:  asthma:                            Cardiovascular:  Exercise tolerance: poor (<4 METS),   (+) hypertension:,          Beta Blocker:  Dose within 24 Hrs         Neuro/Psych:   (+) headaches:, psychiatric history:            GI/Hepatic/Renal:   (+) hiatal hernia, GERD:,           Endo/Other: Negative Endo/Other ROS   (+) : arthritis: rheumatoid. , .                 Abdominal:             Vascular: negative vascular ROS. Other Findings:           Anesthesia Plan      general     ASA 3       Induction: intravenous. Anesthetic plan and risks discussed with patient. Plan discussed with CRNA.                     RENNY Waller - CRNA   2/2/2023

## 2023-02-02 NOTE — PROCEDURES
After consent and under general anesthesia the patient was intubated with a size 8-1/2 endotracheal tube over the bronchoscope. The vocal cords were normal.  The trachea was sharp and midline right and left bronchial trees were explored. There was cobblestoning of the airway bilaterally with what seems to be nodular changes in the airway mucosa. This was biopsied multiple times from the right upper lobe bronchus. Specimen submitted for evaluation in formalin. Endobronchial ultrasound was used to survey the mediastinum. There was a multiple 1 cm subcarinal lymph nodes that were sampled x6 and submitted in RPMI and CytoLyt for flow cytometry and cytology evaluation. Bronchial lavage was obtained from the right middle lobe and submitted for microbiology per orders. Patient tolerated the procedure well. There was no immediate postoperative complications. Estimated blood loss: 2 cc. Complications: None. Specimen:  1. Station 7 lymph node submitted for flow cytometry and cytology. 2.  right upper lobe and right lower lobe bronchus endobronchial biopsy submitted in formalin. 3.  bronchoalveolar lavage of right middle lobe submitted for microbiology  Disposition: postanesthesia recovery per anesthesia service.

## 2023-02-02 NOTE — ADDENDUM NOTE
Addendum  created 02/02/23 1206 by Kisha Pina, [address] edited, Orders acknowledged in Narrator

## 2023-02-02 NOTE — H&P (VIEW-ONLY)
Pulmonary and Sleep Medicine    Derl Query (:  1960) is a 60 y.o. female,Established patient, here for evaluation of the following chief complaint(s):  Follow-up (Follow up- Ct Chest )      Referring physician:  No referring provider defined for this encounter. ASSESSMENT/PLAN:  1. Severe persistent reactive airway disease without complication  -     fluticasone-salmeterol (ADVAIR DISKUS) 500-50 MCG/ACT AEPB diskus inhaler; Inhale 1 puff into the lungs in the morning and 1 puff in the evening., Disp-1 each, R-11Normal  2. Moderate COPD (chronic obstructive pulmonary disease) ([facility] [address]  3. Long COVID  4. Elevated IgE level  5. Esophageal dysphagia  6. Hilar adenopathy  -     Case Request  7. Acute non-recurrent sinusitis, unspecified location  -     cefUROXime (CEFTIN) 500 MG tablet; Take 1 tablet by mouth 2 times daily for 14 days, Disp-28 tablet, R-0Normal      Constellation of symptoms could be combination of asthma exacerbated by severe reflux and or sarcoidosis as manifested by her hilar and mediastinal adenopathy and parenchymal scarring. Discussed bronchoscopy EBUS and TB NA. The patient is in agreement however she would like to do the procedure next week. Stop aspirin next Friday. Continue current management for reflux. She is scheduled to see gastroenterology today. Continue inhalers. Hold off steroids for now until the procedure. Lacho Briggs MD, [facility], [facility]    Return in about 2 weeks (around 2023). SUBJECTIVE/OBJECTIVE:  Patient is here for follow-up on chronic cough, asthma and med/hilar adenopathy. She continues to have cough. She lost weight. CT of the chest showed stable med/hilar LN. she had COVID in 2022. Prior to Visit Medications    Medication Sig Taking?  Authorizing Provider   rosuvastatin (CRESTOR) 10 MG tablet TAKE 1 TABLET BY MOUTH EVERY NIGHT AT BEDTIME Yes Miles Villasenor MD   FLUoxetine (PROZAC) 40 MG capsule TAKE 1 CAPSULE BY MOUTH DAILY Yes Demi Leiva MD   metoprolol succinate (TOPROL XL) 50 MG extended release tablet TAKE 1 TABLET BY MOUTH DAILY Yes Demi Leiva MD   busPIRone (BUSPAR) 15 MG tablet TAKE 1 TABLET BY MOUTH TWICE DAILY AS NEEDED FOR ANXIETY Yes Demi Leiva MD   pantoprazole (PROTONIX) 40 MG tablet TAKE 1 TABLET BY MOUTH BID. Yes Gabriella Aguirre MD   fluticasone (FLONASE) 50 MCG/ACT nasal spray SHAKE LIQUID AND USE 2 SPRAYS IN EACH NOSTRIL DAILY Yes Demi Leiva MD   predniSONE (DELTASONE) 10 MG tablet 40 mg a day and taper by 10 mg every third day to off Yes Gabriella Aguirre MD   dupilumab (DUPIXENT) 300 MG/2ML SOSY injection Inject 2 mLs into the skin every 14 days Yes Gabriella Aguirre MD   vitamin D (ERGOCALCIFEROL) 1.25 MG (17478 UT) CAPS capsule TAKE 1 CAPSULE BY MOUTH 1 TIME WEEKLY Yes Demi Leiva MD   albuterol sulfate HFA (PROVENTIL;VENTOLIN;PROAIR) 108 (90 Base) MCG/ACT inhaler INHALE 2 PUFFS INTO THE LUNGS FOUR TIMES DAILY AS NEEDED FOR WHEEZING Yes Demi Leiva MD   fluticasone-salmeterol (ADVAIR DISKUS) 500-50 MCG/ACT AEPB diskus inhaler Inhale 1 puff into the lungs in the morning and 1 puff in the evening. Yes Gabriella Aguirre MD   Levocetirizine Dihydrochloride (XYZAL PO) Take by mouth Yes Historical Provider, MD   ibandronate (BONIVA) 150 MG tablet Take 1 tablet by mouth every 30 days Take one (1) tablet once per month in the morning with a full glass of water, on an empty stomach, and do not take anything else by mouth or lie down for the next 30 minutes.  Yes Demi Leiva MD   rosuvastatin (CRESTOR) 10 MG tablet TAKE 1 TABLET BY MOUTH EVERY NIGHT Yes Demi Leiva MD   Cholecalciferol (VITAMIN D3) 2000 units CAPS Take by mouth Yes Historical Provider, MD   aspirin 81 MG chewable tablet Take 81 mg by mouth daily Yes Historical Provider, MD   montelukast (SINGULAIR) 10 MG tablet Take 1 tablet by mouth daily  Marletta Narcisa, PA-C        Review of Systems   Constitutional: Negative for activity change, appetite change, chills, diaphoresis and fatigue. HENT:  Negative for congestion, dental problem, drooling, ear discharge, postnasal drip and rhinorrhea. Eyes:  Negative for visual disturbance. Respiratory:  Positive for cough, shortness of breath and wheezing. Negative for apnea and chest tightness. Gastrointestinal:  Negative for abdominal distention, abdominal pain and anal bleeding. Gastroesophageal reflux, dysphagia   Endocrine: Negative for cold intolerance, heat intolerance and polydipsia. Genitourinary:  Negative for difficulty urinating, dysuria, enuresis and flank pain. Musculoskeletal:  Negative for arthralgias, back pain and gait problem. Allergic/Immunologic: Negative for environmental allergies. Neurological:  Negative for dizziness, facial asymmetry, light-headedness and headaches. Vitals:    01/25/23 1055   BP: (!) 148/82   Pulse: 84   Temp: 97.5 °F (36.4 °C)   SpO2: 96%     BMI Readings from Last 1 Encounters:   01/25/23 26.49 kg/m²         Physical Exam  Vitals reviewed. Constitutional:       Appearance: Normal appearance. HENT:      Head: Normocephalic and atraumatic. Nose: Nose normal.   Eyes:      Extraocular Movements: Extraocular movements intact. Conjunctiva/sclera: Conjunctivae normal.   Cardiovascular:      Rate and Rhythm: Normal rate and regular rhythm. Heart sounds: No murmur heard. No friction rub. Pulmonary:      Effort: Pulmonary effort is normal. No respiratory distress. Breath sounds: Normal breath sounds. No stridor. No wheezing, rhonchi or rales. Abdominal:      General: There is no distension. Palpations: There is no mass. Tenderness: There is no abdominal tenderness. There is no guarding or rebound. Musculoskeletal:      Cervical back: Normal range of motion and neck supple. Neurological:      Mental Status: She is alert and oriented to person, place, and time.            This note was generated using a voice recognition software. Errors in voice recognition may have occurred. An electronic signature was used to authenticate this note.     --Celi Rose MD

## 2023-02-02 NOTE — H&P
DAILY Yes Gregory Iniguez MD   metoprolol succinate (TOPROL XL) 50 MG extended release tablet TAKE 1 TABLET BY MOUTH DAILY Yes Gregory Iniguez MD   busPIRone (BUSPAR) 15 MG tablet TAKE 1 TABLET BY MOUTH TWICE DAILY AS NEEDED FOR ANXIETY Yes Gregory Iniguez MD   pantoprazole (PROTONIX) 40 MG tablet TAKE 1 TABLET BY MOUTH BID. Yes Gabriella Aguirre MD   fluticasone (FLONASE) 50 MCG/ACT nasal spray SHAKE LIQUID AND USE 2 SPRAYS IN EACH NOSTRIL DAILY Yes Gregory Iniguez MD   predniSONE (DELTASONE) 10 MG tablet 40 mg a day and taper by 10 mg every third day to off Yes Gabriella Aguirre MD   dupilumab (DUPIXENT) 300 MG/2ML SOSY injection Inject 2 mLs into the skin every 14 days Yes Gabriella Aguirre MD   vitamin D (ERGOCALCIFEROL) 1.25 MG (89981 UT) CAPS capsule TAKE 1 CAPSULE BY MOUTH 1 TIME WEEKLY Yes Gregory Iniguez MD   albuterol sulfate HFA (PROVENTIL;VENTOLIN;PROAIR) 108 (90 Base) MCG/ACT inhaler INHALE 2 PUFFS INTO THE LUNGS FOUR TIMES DAILY AS NEEDED FOR WHEEZING Yes Gregory Iniguez MD   fluticasone-salmeterol (ADVAIR DISKUS) 500-50 MCG/ACT AEPB diskus inhaler Inhale 1 puff into the lungs in the morning and 1 puff in the evening. Yes Gabriella Aguirre MD   Levocetirizine Dihydrochloride (XYZAL PO) Take by mouth Yes Historical Provider, MD   ibandronate (BONIVA) 150 MG tablet Take 1 tablet by mouth every 30 days Take one (1) tablet once per month in the morning with a full glass of water, on an empty stomach, and do not take anything else by mouth or lie down for the next 30 minutes.  Yes Gregory Iniguez MD   rosuvastatin (CRESTOR) 10 MG tablet TAKE 1 TABLET BY MOUTH EVERY NIGHT Yes Gregory Iniguez MD   Cholecalciferol (VITAMIN D3) 2000 units CAPS Take by mouth Yes Historical Provider, MD   aspirin 81 MG chewable tablet Take 81 mg by mouth daily Yes Historical Provider, MD   montelukast (SINGULAIR) 10 MG tablet Take 1 tablet by mouth daily  Rebecca Barker PA-C        Review of Systems   Constitutional:

## 2023-02-02 NOTE — INTERVAL H&P NOTE
Update History & Physical    The patient's History and Physical of January 25, 2023 was reviewed with the patient and I examined the patient. There was no change. The surgical site was confirmed by the patient and me. Plan: The risks, benefits, expected outcome, and alternative to the recommended procedure have been discussed with the patient. Patient understands and wants to proceed with the procedure.      Electronically signed by Randy Bolton MD on 2/2/2023 at 8:01 AM

## 2023-02-03 LAB — AFB SMEAR: NORMAL

## 2023-02-04 LAB
CULTURE, RESPIRATORY: ABNORMAL
CULTURE, RESPIRATORY: ABNORMAL
GRAM STAIN RESULT: ABNORMAL
ORGANISM: ABNORMAL

## 2023-02-21 ENCOUNTER — OFFICE VISIT (OUTPATIENT)
Dept: PULMONOLOGY | Age: 63
End: 2023-02-21
Payer: COMMERCIAL

## 2023-02-21 VITALS
SYSTOLIC BLOOD PRESSURE: 134 MMHG | TEMPERATURE: 97.5 F | BODY MASS INDEX: 26.12 KG/M2 | HEART RATE: 71 BPM | OXYGEN SATURATION: 97 % | HEIGHT: 65 IN | DIASTOLIC BLOOD PRESSURE: 80 MMHG | WEIGHT: 156.8 LBS

## 2023-02-21 DIAGNOSIS — D72.18 EOSINOPHILIC BRONCHITIS: ICD-10-CM

## 2023-02-21 DIAGNOSIS — J82.83 EOSINOPHILIC ASTHMA: ICD-10-CM

## 2023-02-21 DIAGNOSIS — R59.0 HILAR ADENOPATHY: ICD-10-CM

## 2023-02-21 DIAGNOSIS — J40 CHRONIC SINUSITIS WITH RECURRENT BRONCHITIS: ICD-10-CM

## 2023-02-21 DIAGNOSIS — J45.50 SEVERE PERSISTENT REACTIVE AIRWAY DISEASE WITHOUT COMPLICATION: Primary | ICD-10-CM

## 2023-02-21 DIAGNOSIS — J40 EOSINOPHILIC BRONCHITIS: ICD-10-CM

## 2023-02-21 DIAGNOSIS — J32.9 CHRONIC SINUSITIS WITH RECURRENT BRONCHITIS: ICD-10-CM

## 2023-02-21 DIAGNOSIS — R13.19 ESOPHAGEAL DYSPHAGIA: ICD-10-CM

## 2023-02-21 DIAGNOSIS — J44.9 MODERATE COPD (CHRONIC OBSTRUCTIVE PULMONARY DISEASE) (HCC): ICD-10-CM

## 2023-02-21 DIAGNOSIS — R76.8 ELEVATED IGE LEVEL: ICD-10-CM

## 2023-02-21 PROCEDURE — 3075F SYST BP GE 130 - 139MM HG: CPT | Performed by: INTERNAL MEDICINE

## 2023-02-21 PROCEDURE — 99214 OFFICE O/P EST MOD 30 MIN: CPT | Performed by: INTERNAL MEDICINE

## 2023-02-21 PROCEDURE — 3079F DIAST BP 80-89 MM HG: CPT | Performed by: INTERNAL MEDICINE

## 2023-02-21 PROCEDURE — A4617 MOUTH PIECE: HCPCS | Performed by: INTERNAL MEDICINE

## 2023-02-21 RX ORDER — PREDNISONE 10 MG/1
TABLET ORAL
Qty: 35 TABLET | Refills: 0 | Status: SHIPPED | OUTPATIENT
Start: 2023-02-21

## 2023-02-21 RX ORDER — CEFUROXIME AXETIL 500 MG/1
500 TABLET ORAL 2 TIMES DAILY
Qty: 14 TABLET | Refills: 0 | Status: SHIPPED | OUTPATIENT
Start: 2023-02-21 | End: 2023-02-28

## 2023-02-21 ASSESSMENT — ENCOUNTER SYMPTOMS
SHORTNESS OF BREATH: 1
COUGH: 1
BACK PAIN: 0
ANAL BLEEDING: 0
ABDOMINAL PAIN: 0
WHEEZING: 1
CHEST TIGHTNESS: 0
APNEA: 0
ABDOMINAL DISTENTION: 0
RHINORRHEA: 0

## 2023-02-23 RX ORDER — BUSPIRONE HYDROCHLORIDE 15 MG/1
TABLET ORAL
Qty: 60 TABLET | Refills: 3 | Status: SHIPPED | OUTPATIENT
Start: 2023-02-23

## 2023-02-23 NOTE — TELEPHONE ENCOUNTER
Alfreda Bowers called requesting a refill of the below medication which has been pended for you:     Requested Prescriptions     Pending Prescriptions Disp Refills    busPIRone (BUSPAR) 15 MG tablet [Pharmacy Med Name: BUSPIRONE 15MG TABLETS] 60 tablet 3     Sig: TAKE 1 TABLET BY MOUTH TWICE DAILY AS NEEDED FOR ANXIETY       Last Appointment Date: 1/17/2023  Next Appointment Date: 7/17/2023    Allergies   Allergen Reactions    Morphine And Related Headaches

## 2023-03-08 ENCOUNTER — HOSPITAL ENCOUNTER (OUTPATIENT)
Dept: CT IMAGING | Age: 63
Discharge: HOME | End: 2023-03-08
Payer: COMMERCIAL

## 2023-03-08 DIAGNOSIS — J32.9 CHRONIC SINUSITIS WITH RECURRENT BRONCHITIS: ICD-10-CM

## 2023-03-08 DIAGNOSIS — J40 CHRONIC SINUSITIS WITH RECURRENT BRONCHITIS: ICD-10-CM

## 2023-03-08 PROCEDURE — 70486 CT MAXILLOFACIAL W/O DYE: CPT

## 2023-03-20 ENCOUNTER — TELEPHONE (OUTPATIENT)
Dept: GASTROENTEROLOGY | Age: 63
End: 2023-03-20

## 2023-03-20 NOTE — TELEPHONE ENCOUNTER
Called patient to remind them of their procedure with Dr. Anju Dubois  at [facility]  on 3/23/23 arrive at 1:00PM     PATIENT CONFIRMED

## 2023-03-22 ENCOUNTER — ANESTHESIA EVENT (OUTPATIENT)
Dept: OPERATING ROOM | Age: 63
End: 2023-03-22

## 2023-03-22 LAB
ACID FAST STN SPEC QL: NORMAL
MYCOBACTERIUM SPEC CULT: NORMAL

## 2023-03-23 ENCOUNTER — HOSPITAL ENCOUNTER (OUTPATIENT)
Age: 63
Setting detail: SPECIMEN
Discharge: HOME | End: 2023-03-23
Payer: COMMERCIAL

## 2023-03-23 ENCOUNTER — APPOINTMENT (OUTPATIENT)
Dept: OPERATING ROOM | Age: 63
End: 2023-03-23

## 2023-03-23 ENCOUNTER — ANESTHESIA (OUTPATIENT)
Dept: OPERATING ROOM | Age: 63
End: 2023-03-23

## 2023-03-23 ENCOUNTER — HOSPITAL ENCOUNTER (OUTPATIENT)
Age: 63
Setting detail: OUTPATIENT SURGERY
Discharge: HOME | End: 2023-03-23
Attending: INTERNAL MEDICINE | Admitting: INTERNAL MEDICINE

## 2023-03-23 VITALS
TEMPERATURE: 98.7 F | WEIGHT: 155 LBS | OXYGEN SATURATION: 96 % | HEART RATE: 74 BPM | SYSTOLIC BLOOD PRESSURE: 124 MMHG | HEIGHT: 65 IN | BODY MASS INDEX: 25.83 KG/M2 | RESPIRATION RATE: 18 BRPM | DIASTOLIC BLOOD PRESSURE: 71 MMHG

## 2023-03-23 DIAGNOSIS — K21.9 GASTROESOPHAGEAL REFLUX DISEASE WITHOUT ESOPHAGITIS: ICD-10-CM

## 2023-03-23 PROCEDURE — 43239 EGD BIOPSY SINGLE/MULTIPLE: CPT

## 2023-03-23 PROCEDURE — 43248 EGD GUIDE WIRE INSERTION: CPT

## 2023-03-23 PROCEDURE — G8907 PT DOC NO EVENTS ON DISCHARG: HCPCS

## 2023-03-23 PROCEDURE — G8918 PT W/O PREOP ORDER IV AB PRO: HCPCS

## 2023-03-23 PROCEDURE — 88342 IMHCHEM/IMCYTCHM 1ST ANTB: CPT

## 2023-03-23 PROCEDURE — 88305 TISSUE EXAM BY PATHOLOGIST: CPT

## 2023-03-23 RX ORDER — IPRATROPIUM BROMIDE AND ALBUTEROL SULFATE 2.5; .5 MG/3ML; MG/3ML
1 SOLUTION RESPIRATORY (INHALATION) ONCE
Status: COMPLETED | OUTPATIENT
Start: 2023-03-23 | End: 2023-03-23

## 2023-03-23 RX ORDER — SODIUM CHLORIDE, SODIUM LACTATE, POTASSIUM CHLORIDE, CALCIUM CHLORIDE 600; 310; 30; 20 MG/100ML; MG/100ML; MG/100ML; MG/100ML
INJECTION, SOLUTION INTRAVENOUS CONTINUOUS
Status: DISCONTINUED | OUTPATIENT
Start: 2023-03-23 | End: 2023-03-23 | Stop reason: HOSPADM

## 2023-03-23 RX ORDER — PANTOPRAZOLE SODIUM 40 MG/1
40 TABLET, DELAYED RELEASE ORAL 2 TIMES DAILY
Qty: 180 TABLET | Refills: 3 | Status: SHIPPED | OUTPATIENT
Start: 2023-03-23

## 2023-03-23 RX ORDER — PROPOFOL 10 MG/ML
INJECTION, EMULSION INTRAVENOUS PRN
Status: DISCONTINUED | OUTPATIENT
Start: 2023-03-23 | End: 2023-03-23 | Stop reason: SDUPTHER

## 2023-03-23 RX ORDER — LIDOCAINE HYDROCHLORIDE 10 MG/ML
INJECTION, SOLUTION EPIDURAL; INFILTRATION; INTRACAUDAL; PERINEURAL PRN
Status: DISCONTINUED | OUTPATIENT
Start: 2023-03-23 | End: 2023-03-23 | Stop reason: SDUPTHER

## 2023-03-23 RX ADMIN — SODIUM CHLORIDE, SODIUM LACTATE, POTASSIUM CHLORIDE, CALCIUM CHLORIDE: 600; 310; 30; 20 INJECTION, SOLUTION INTRAVENOUS at 09:24

## 2023-03-23 RX ADMIN — PROPOFOL 200 MG: 10 INJECTION, EMULSION INTRAVENOUS at 09:57

## 2023-03-23 RX ADMIN — LIDOCAINE HYDROCHLORIDE 30 MG: 10 INJECTION, SOLUTION EPIDURAL; INFILTRATION; INTRACAUDAL; PERINEURAL at 09:57

## 2023-03-23 RX ADMIN — IPRATROPIUM BROMIDE AND ALBUTEROL SULFATE 1 AMPULE: 2.5; .5 SOLUTION RESPIRATORY (INHALATION) at 10:28

## 2023-03-23 ASSESSMENT — ENCOUNTER SYMPTOMS: SHORTNESS OF BREATH: 1

## 2023-03-23 NOTE — ANESTHESIA POSTPROCEDURE EVALUATION
Department of Anesthesiology  Postprocedure Note    Patient: Alee Perez  MRN: [id number]  YOB: 1960  Date of evaluation: 3/23/2023      Procedure Summary     Date: 03/23/23 Room / Location: [facility] 01 / [address]    Anesthesia Start: 9756 Anesthesia Stop:     Procedure: EGD BIOPSY (Esophagus) Diagnosis:       Dysphagia, unspecified type      Chronic GERD      (Dysphagia, unspecified type [R13.10])      (Chronic GERD [K21.9])    Surgeons: Munir Sung MD Responsible Provider: RENNY Tucker CRNA    Anesthesia Type: general, TIVA ASA Status: 3          Anesthesia Type: No value filed.     Jennifer Phase I:      Jennifer Phase II:        Anesthesia Post Evaluation    Patient location during evaluation: bedside  Patient participation: complete - patient participated  Level of consciousness: sleepy but conscious  Pain score: 0  Airway patency: patent  Nausea & Vomiting: no nausea and no vomiting  Complications: no  Cardiovascular status: blood pressure returned to baseline  Respiratory status: acceptable, room air and spontaneous ventilation  Hydration status: euvolemic

## 2023-03-23 NOTE — OP NOTE
Endoscopic Procedure Note    Patient: Heath Loving : 1960  [facility] Rec#: [id number] Acc#: [id number]     Primary Care Provider Mariaelena See MD  Referring Provider: RENNY Monique    Endoscopist: Layton Olivera MD    Date of Procedure:  3/23/2023    Procedure:   1. EGD with biopsy  2. EGD with wire-guided dilation- 47 F    Indications:   1. Dysphagia   2. Reflux     Anesthesia:  Sedation was administered by anesthesia who monitored the patient during the procedure. Estimated Blood Loss: minimal    Procedure:   After reviewing the patient's chart and obtaining informed consent, the patient was placed in the left lateral decubitus position. A forward-viewing Olympus endoscope was lubricated and inserted through the mouth into the oropharynx. Under direct visualization, the upper esophagus was intubated. The scope was advanced to the level of the third portion of duodenum. Scope was slowly withdrawn with careful inspection of the mucosal surfaces. The scope was retroflexed for inspection of the gastric fundus and incisura. Findings and maneuvers are listed in impression below. The patient tolerated the procedure well. The scope was removed. There were no immediate complications. Findings:   Esophagus: abnormal: there is evidence of a small gastric inlet patch in the upper esophagus. There is a questionable, benign appearing stricture in the distal esophagus, dilated due to symptoms. A guidewire was placed through the endoscope and the endoscope was removed. A 54 Finnish savory dilator was advanced down the length of the esophagus using a fixed-point wire technique. The dilator and guidewire were removed. The patient tolerated the procedure well. There are mucosal changes in the distal esophagus, esophagitis vs Cole's esophagus- biopised. There is a 4 cm hiatal hernia present.       Stomach:  Abnormal: Mild mucosal changes suggestive of gastritis noted -  Gastric biopsies were

## 2023-03-23 NOTE — ANESTHESIA PRE PROCEDURE
obstructive pulmonary disease) (HCC) S30.3    Eosinophilic asthma Y77.18    Elevated IgE level R76.8    Esophageal dysphagia R13.19    Hilar adenopathy R59.0    Chronic sinusitis with recurrent bronchitis J32.9, Y33    Eosinophilic bronchitis G59, Z46.40       Past Medical History:        Diagnosis Date    Cough     Depression     Eosinophilic asthma 85/67/0370    Generalized anxiety disorder     GERD (gastroesophageal reflux disease)     Headache     Hyperlipidemia     Hypertension     Insomnia     Moderate COPD (chronic obstructive pulmonary disease) ([facility] [address] 11/30/2022    Osteoarthritis     Rheumatoid arthritis ([facility] [address]     Vitamin D deficiency        Past Surgical History:        Procedure Laterality Date    ADENOIDECTOMY      BREAST BIOPSY      BREAST BIOPSY      BRONCHOSCOPY N/A 2/2/2023    BRONCHOSCOPY ENDOBRONCHIAL ULTRASOUND performed by Donivan Bence, MD at [address] [place] [address] Endoscopy    COLONOSCOPY  05/14/2015    Dr Manning-Diverticulosis, 5 yr recall    COLONOSCOPY N/A 02/10/2022    Dr Yuliya Marvin lipoma-BCM, 5 yr recall    OVARY REMOVAL Right     TONSILLECTOMY      US THYROID BIOPSY  10/19/2022    US THYROID BIOPSY 10/19/2022 [facility] ULTRASOUND       Social History:    Social History     Tobacco Use    Smoking status: Never    Smokeless tobacco: Never   Substance Use Topics    Alcohol use: Yes     Comment: occ                                Counseling given: Not Answered      Vital Signs (Current):   Vitals:    03/23/23 0918   BP: 134/82   Pulse: 70   Resp: 16   Temp: 98 °F (36.7 °C)   SpO2: 94%   Weight: 155 lb (70.3 kg)   Height: 5' 5\" (1.651 m)                                              BP Readings from Last 3 Encounters:   03/23/23 134/82   02/21/23 134/80   02/02/23 133/68       NPO Status: Time of last liquid consumption: 2300                        Time of last solid consumption: 2300                        Date of last liquid consumption: 03/22/23

## 2023-03-23 NOTE — H&P
Cholecalciferol (VITAMIN D3) 2000 units CAPS Take by mouth    Historical Provider, MD   aspirin 81 MG chewable tablet Take 81 mg by mouth daily    Historical Provider, MD       Past Medical History:  Past Medical History:   Diagnosis Date    Cough     Depression     Eosinophilic asthma 09/43/0375    Generalized anxiety disorder     GERD (gastroesophageal reflux disease)     Headache     Hyperlipidemia     Hypertension     Insomnia     Moderate COPD (chronic obstructive pulmonary disease) ([facility] [address] 11/30/2022    Osteoarthritis     Rheumatoid arthritis ([facility] [address]     Vitamin D deficiency        Past Surgical History:  Past Surgical History:   Procedure Laterality Date    ADENOIDECTOMY      BREAST BIOPSY      BREAST BIOPSY      BRONCHOSCOPY N/A 2/2/2023    BRONCHOSCOPY ENDOBRONCHIAL ULTRASOUND performed by Chaim Zapata MD at [facility] Endoscopy    COLONOSCOPY  05/14/2015    Dr Manning-Diverticulosis, 5 yr recall    COLONOSCOPY N/A 02/10/2022    Dr Melissa Cunningham lipoma-BCM, 5 yr recall    OVARY REMOVAL Right     TONSILLECTOMY      US THYROID BIOPSY  10/19/2022    US THYROID BIOPSY 10/19/2022 L ULTRASOUND       Social History:  Social History     Tobacco Use    Smoking status: Never    Smokeless tobacco: Never   Vaping Use    Vaping Use: Never used   Substance Use Topics    Alcohol use: Yes     Comment: occ    Drug use: No       Vital Signs:   Vitals:    03/23/23 0918   BP: 134/82   Pulse: 70   Resp: 16   Temp: 98 °F (36.7 °C)   SpO2: 94%        Physical Exam:  Cardiac:  [x]WNL  []Comments:  Pulmonary:  [x]WNL   []Comments:  Neuro/Mental Status:  [x]WNL  []Comments:  Abdominal:  [x]WNL    []Comments:  Other:   []WNL  []Comments:    Informed Consent:  The risks and benefits of the procedure have been discussed with either the patient or if they cannot consent, their representative. Assessment:  Patient examined and appropriate for planned sedation and procedure.      Plan:  Proceed with planned sedation and procedure as above. Gennie Koyanagi, pam scribing for and in the presence of Dr. Layton Olivera MD.  Electronically signed by Sergio Frazier RN on 3/23/2023 at 9:23 AM    I personally performed the services described in this documentation as scribed by Jordan Tinoco, and it appears accurate and complete.      Layton Olivera MD  3/23/2023

## 2023-03-23 NOTE — DISCHARGE INSTRUCTIONS
RECOMMENDATIONS:    1. Await path results, if histology indicates Cole's repeat EGD in 1 year  2. Start PPI daily  3. Repeat dilation as needed  4. Follow up OV with EMI Mccarty in 4-6 weeks    POST-OP ORDERS: ENDOSCOPY & COLONOSCOPY:    1. Rest today. 2. DO NOT eat or drink until wide awake; eat your usual diet today in moderate amount only. 3. DO NOT drive today. 4. Call physician if you have severe pain, vomiting, fever, rectal bleeding or black bowel movements. 5.  If a biopsy was taken or a polyp removed, you should expect to hear results in about 7-10 days. If you have heard nothing from your physician by then, call the office for results. 6.  Discharge home when patient awake, vitals signs stable and tolerating liquids.

## 2023-03-27 RX ORDER — FLUTICASONE PROPIONATE 50 MCG
SPRAY, SUSPENSION (ML) NASAL
Qty: 16 G | Refills: 2 | Status: SHIPPED | OUTPATIENT
Start: 2023-03-27

## 2023-03-27 NOTE — TELEPHONE ENCOUNTER
Brett Roberts called requesting a refill of the below medication which has been pended for you:     Requested Prescriptions     Pending Prescriptions Disp Refills    fluticasone (FLONASE) 50 MCG/ACT nasal spray [Pharmacy Med Name: FLUTICASONE 50MCG NASAL SP (120) RX] 16 g 2     Sig: SHAKE LIQUID AND USE 2 SPRAYS IN EACH NOSTRIL DAILY       Last Appointment Date: 1/17/2023  Next Appointment Date: 7/17/2023    Allergies   Allergen Reactions    Morphine And Related Headaches

## 2023-03-29 ENCOUNTER — OFFICE VISIT (OUTPATIENT)
Dept: PULMONOLOGY | Age: 63
End: 2023-03-29
Payer: COMMERCIAL

## 2023-03-29 VITALS
BODY MASS INDEX: 24.96 KG/M2 | SYSTOLIC BLOOD PRESSURE: 127 MMHG | TEMPERATURE: 97.4 F | WEIGHT: 149.8 LBS | HEIGHT: 65 IN | OXYGEN SATURATION: 94 % | DIASTOLIC BLOOD PRESSURE: 78 MMHG | HEART RATE: 76 BPM

## 2023-03-29 DIAGNOSIS — J44.9 MODERATE COPD (CHRONIC OBSTRUCTIVE PULMONARY DISEASE) (HCC): ICD-10-CM

## 2023-03-29 DIAGNOSIS — G47.33 OSA (OBSTRUCTIVE SLEEP APNEA): ICD-10-CM

## 2023-03-29 DIAGNOSIS — J45.50 SEVERE PERSISTENT REACTIVE AIRWAY DISEASE WITHOUT COMPLICATION: Primary | ICD-10-CM

## 2023-03-29 DIAGNOSIS — J40 EOSINOPHILIC BRONCHITIS: ICD-10-CM

## 2023-03-29 DIAGNOSIS — R76.8 ELEVATED IGE LEVEL: ICD-10-CM

## 2023-03-29 DIAGNOSIS — R06.83 SNORING: ICD-10-CM

## 2023-03-29 DIAGNOSIS — R13.19 ESOPHAGEAL DYSPHAGIA: ICD-10-CM

## 2023-03-29 DIAGNOSIS — D72.18 EOSINOPHILIC BRONCHITIS: ICD-10-CM

## 2023-03-29 DIAGNOSIS — G47.8 NON-RESTORATIVE SLEEP: ICD-10-CM

## 2023-03-29 PROCEDURE — 99214 OFFICE O/P EST MOD 30 MIN: CPT | Performed by: INTERNAL MEDICINE

## 2023-03-29 PROCEDURE — 3078F DIAST BP <80 MM HG: CPT | Performed by: INTERNAL MEDICINE

## 2023-03-29 PROCEDURE — 3074F SYST BP LT 130 MM HG: CPT | Performed by: INTERNAL MEDICINE

## 2023-03-29 ASSESSMENT — ENCOUNTER SYMPTOMS
APNEA: 0
RHINORRHEA: 0
WHEEZING: 1
ANAL BLEEDING: 0
SHORTNESS OF BREATH: 1
ABDOMINAL DISTENTION: 0
ABDOMINAL PAIN: 0
BACK PAIN: 0
CHEST TIGHTNESS: 0
COUGH: 0

## 2023-03-29 NOTE — PROGRESS NOTES
Pulmonary and Sleep Medicine    Tomás Lino (:  1960) is a 60 y.o. female,Established patient, here for evaluation of the following chief complaint(s):  Follow-up (Follow up- Eosinophilic asthma, CT Sinus )      Referring physician:  No referring provider defined for this encounter. ASSESSMENT/PLAN:  1. Severe persistent reactive airway disease without complication  2. Eosinophilic bronchitis  3. Moderate COPD (chronic obstructive pulmonary disease) (HCC)  4. Elevated IgE level  5. Esophageal dysphagia  6. GENO (obstructive sleep apnea)  -     Ambulatory referral to Sleep Medicine  -     Home Sleep Study; Future  7. Non-restorative sleep  -     Ambulatory referral to Sleep Medicine  -     Home Sleep Study; Future  8. Snoring  -     Ambulatory referral to Sleep Medicine  -     Home Sleep Study; Future      Will continue with the dupixent. Continue with the inhalers. Continue advair. She has complete opacification of all her sinuses. She needs eval with ENT. Increase the flonase to twice a day add nasal saline. We will obtain a sleep study. She has significant risk factors for sleep apnea which could be high risk condition in her situation. Bradford Santiago MD, [facility], [facility]    Return in about 6 weeks (around 5/10/2023). SUBJECTIVE/OBJECTIVE:  She is here for follow up on asthma. She is on dupixent and she feels it is helping. She is complaining of fatigue and snoring and sleep apnea. She is sleepy through the day. Her sleep is non refreshing. She is witnessed by her family to quit breathing during sleep. Prior to Visit Medications    Medication Sig Taking? Authorizing Provider   fluticasone (FLONASE) 50 MCG/ACT nasal spray SHAKE LIQUID AND USE 2 SPRAYS IN EACH NOSTRIL DAILY Yes Nina Tamez MD   pantoprazole (PROTONIX) 40 MG tablet Take 1 tablet by mouth 2 times daily TAKE 1 TABLET BY MOUTH BID.  Yes Torri Rossi MD   busPIRone (BUSPAR) 15 MG tablet TAKE 1 TABLET BY MOUTH

## 2023-05-03 ENCOUNTER — OFFICE VISIT (OUTPATIENT)
Dept: ENT CLINIC | Age: 63
End: 2023-05-03
Payer: COMMERCIAL

## 2023-05-03 ENCOUNTER — HOSPITAL ENCOUNTER (OUTPATIENT)
Dept: PREADMISSION TESTING | Age: 63
Discharge: HOME | End: 2023-05-07
Payer: COMMERCIAL

## 2023-05-03 VITALS — HEIGHT: 65 IN | WEIGHT: 140 LBS | BODY MASS INDEX: 23.32 KG/M2

## 2023-05-03 VITALS
DIASTOLIC BLOOD PRESSURE: 82 MMHG | BODY MASS INDEX: 23.32 KG/M2 | SYSTOLIC BLOOD PRESSURE: 124 MMHG | HEIGHT: 65 IN | WEIGHT: 140 LBS

## 2023-05-03 DIAGNOSIS — J32.4 CHRONIC PANSINUSITIS: Primary | ICD-10-CM

## 2023-05-03 LAB
ANION GAP SERPL CALCULATED.3IONS-SCNC: 9 MMOL/L (ref 7–19)
BASOPHILS # BLD: 0.1 K/UL (ref 0–0.2)
BASOPHILS NFR BLD: 0.8 % (ref 0–1)
BUN SERPL-MCNC: 10 MG/DL (ref 8–23)
CALCIUM SERPL-MCNC: 9.2 MG/DL (ref 8.8–10.2)
CHLORIDE SERPL-SCNC: 99 MMOL/L (ref 98–111)
CO2 SERPL-SCNC: 28 MMOL/L (ref 22–29)
CREAT SERPL-MCNC: 0.9 MG/DL (ref 0.5–0.9)
EOSINOPHIL # BLD: 1.7 K/UL (ref 0–0.6)
EOSINOPHIL NFR BLD: 14.6 % (ref 0–5)
ERYTHROCYTE [DISTWIDTH] IN BLOOD BY AUTOMATED COUNT: 15.3 % (ref 11.5–14.5)
GLUCOSE SERPL-MCNC: 84 MG/DL (ref 74–109)
HCT VFR BLD AUTO: 40.1 % (ref 37–47)
HGB BLD-MCNC: 12.5 G/DL (ref 12–16)
IMM GRANULOCYTES # BLD: 0 K/UL
LYMPHOCYTES # BLD: 1.6 K/UL (ref 1.1–4.5)
LYMPHOCYTES NFR BLD: 13.3 % (ref 20–40)
MCH RBC QN AUTO: 26.2 PG (ref 27–31)
MCHC RBC AUTO-ENTMCNC: 31.2 G/DL (ref 33–37)
MCV RBC AUTO: 83.9 FL (ref 81–99)
MONOCYTES # BLD: 0.8 K/UL (ref 0–0.9)
MONOCYTES NFR BLD: 6.8 % (ref 0–10)
NEUTROPHILS # BLD: 7.6 K/UL (ref 1.5–7.5)
NEUTS SEG NFR BLD: 64.2 % (ref 50–65)
PLATELET # BLD AUTO: 292 K/UL (ref 130–400)
PMV BLD AUTO: 9.3 FL (ref 9.4–12.3)
POTASSIUM SERPL-SCNC: 3.3 MMOL/L (ref 3.5–5)
RBC # BLD AUTO: 4.78 M/UL (ref 4.2–5.4)
SODIUM SERPL-SCNC: 136 MMOL/L (ref 136–145)
WBC # BLD AUTO: 11.9 K/UL (ref 4.8–10.8)

## 2023-05-03 PROCEDURE — 3074F SYST BP LT 130 MM HG: CPT | Performed by: OTOLARYNGOLOGY

## 2023-05-03 PROCEDURE — 99214 OFFICE O/P EST MOD 30 MIN: CPT | Performed by: OTOLARYNGOLOGY

## 2023-05-03 PROCEDURE — 80048 BASIC METABOLIC PNL TOTAL CA: CPT

## 2023-05-03 PROCEDURE — 85025 COMPLETE CBC W/AUTO DIFF WBC: CPT

## 2023-05-03 PROCEDURE — 93005 ELECTROCARDIOGRAM TRACING: CPT | Performed by: ANESTHESIOLOGY

## 2023-05-03 PROCEDURE — 3079F DIAST BP 80-89 MM HG: CPT | Performed by: OTOLARYNGOLOGY

## 2023-05-03 RX ORDER — AMOXICILLIN AND CLAVULANATE POTASSIUM 875; 125 MG/1; MG/1
1 TABLET, FILM COATED ORAL 2 TIMES DAILY
Qty: 28 TABLET | Refills: 0 | Status: SHIPPED | OUTPATIENT
Start: 2023-05-03 | End: 2023-05-17

## 2023-05-03 RX ORDER — DUPILUMAB 300 MG/2ML
300 INJECTION, SOLUTION SUBCUTANEOUS
COMMUNITY

## 2023-05-03 RX ORDER — PREDNISONE 20 MG/1
TABLET ORAL
Qty: 21 TABLET | Refills: 0 | Status: SHIPPED | OUTPATIENT
Start: 2023-05-03

## 2023-05-03 ASSESSMENT — ENCOUNTER SYMPTOMS
SINUS PAIN: 1
ALLERGIC/IMMUNOLOGIC NEGATIVE: 1
EYES NEGATIVE: 1
GASTROINTESTINAL NEGATIVE: 1
SINUS PRESSURE: 1
RESPIRATORY NEGATIVE: 1

## 2023-05-03 NOTE — PROGRESS NOTES
5/3/2023    Abbey Yee (:  1960) is a 60 y.o. female, Established patient, here for evaluation of the following chief complaint(s):  Follow-up (Facial pain)      Vitals:    23 1432   BP: 124/82   Weight: 140 lb (63.5 kg)   Height: 5' 5\" (1.651 m)       Wt Readings from Last 3 Encounters:   23 140 lb (63.5 kg)   23 140 lb (63.5 kg)   04/10/23 144 lb (65.3 kg)       BP Readings from Last 3 Encounters:   23 124/82   04/10/23 118/82   23 127/78         SUBJECTIVE/OBJECTIVE:    Patient seen today for sinus. She suffers from pansinusitis and we plan to go to surgery in about 2 weeks. Yesterday she suffers extreme facial pain. She states slightly better today but she still very swollen in her face. She has what looks like allergic fungal sinusitis. Review of Systems   Constitutional: Negative. HENT:  Positive for sinus pressure and sinus pain. Eyes: Negative. Respiratory: Negative. Cardiovascular: Negative. Gastrointestinal: Negative. Endocrine: Negative. Musculoskeletal: Negative. Skin: Negative. Allergic/Immunologic: Negative. Neurological: Negative. Hematological: Negative. Psychiatric/Behavioral: Negative. Physical Exam  Vitals reviewed. Constitutional:       Appearance: Normal appearance. She is normal weight. HENT:      Head: Normocephalic and atraumatic. Right Ear: Tympanic membrane, ear canal and external ear normal.      Left Ear: Tympanic membrane, ear canal and external ear normal.      Nose: Nose normal.      Mouth/Throat:      Mouth: Mucous membranes are moist.      Pharynx: Oropharynx is clear. Eyes:      Extraocular Movements: Extraocular movements intact. Pupils: Pupils are equal, round, and reactive to light. Comments: Periorbital edema and erythema   Cardiovascular:      Rate and Rhythm: Normal rate and regular rhythm.    Pulmonary:      Effort: Pulmonary effort is normal.      Breath

## 2023-05-06 LAB
EKG P AXIS: -19 DEGREES
EKG P-R INTERVAL: 174 MS
EKG Q-T INTERVAL: 388 MS
EKG QRS DURATION: 80 MS
EKG QTC CALCULATION (BAZETT): 412 MS
EKG T AXIS: 1 DEGREES

## 2023-05-06 PROCEDURE — 93010 ELECTROCARDIOGRAM REPORT: CPT | Performed by: INTERNAL MEDICINE

## 2023-05-11 ENCOUNTER — HOSPITAL ENCOUNTER (OUTPATIENT)
Dept: CT IMAGING | Age: 63
Discharge: HOME | End: 2023-05-11
Payer: COMMERCIAL

## 2023-05-11 DIAGNOSIS — J32.9 CHRONIC SINUSITIS, UNSPECIFIED LOCATION: ICD-10-CM

## 2023-05-11 DIAGNOSIS — J33.9 NASAL POLYPS: ICD-10-CM

## 2023-05-11 PROCEDURE — 70486 CT MAXILLOFACIAL W/O DYE: CPT | Performed by: RADIOLOGY

## 2023-05-11 PROCEDURE — 70486 CT MAXILLOFACIAL W/O DYE: CPT

## 2023-05-11 ASSESSMENT — ENCOUNTER SYMPTOMS
ALLERGIC/IMMUNOLOGIC NEGATIVE: 1
GASTROINTESTINAL NEGATIVE: 1
EYES NEGATIVE: 1
RESPIRATORY NEGATIVE: 1
SINUS PRESSURE: 1
SINUS PAIN: 1

## 2023-05-11 NOTE — H&P
5/3/2023    Yesenia Garcia (:  1960) is a 60 y.o. female, Established patient, here for evaluation of the following chief complaint(s):  Follow-up (Facial pain)      Vitals:    23 1432   BP: 124/82   Weight: 140 lb (63.5 kg)   Height: 5' 5\" (1.651 m)       Wt Readings from Last 3 Encounters:   23 140 lb (63.5 kg)   23 140 lb (63.5 kg)   04/10/23 144 lb (65.3 kg)       BP Readings from Last 3 Encounters:   23 124/82   04/10/23 118/82   23 127/78         SUBJECTIVE/OBJECTIVE:    Patient seen today for sinus. She suffers from pansinusitis and we plan to go to surgery in about 2 weeks. Yesterday she suffers extreme facial pain. She states slightly better today but she still very swollen in her face. She has what looks like allergic fungal sinusitis. Review of Systems   Constitutional: Negative. HENT:  Positive for sinus pressure and sinus pain. Eyes: Negative. Respiratory: Negative. Cardiovascular: Negative. Gastrointestinal: Negative. Endocrine: Negative. Musculoskeletal: Negative. Skin: Negative. Allergic/Immunologic: Negative. Neurological: Negative. Hematological: Negative. Psychiatric/Behavioral: Negative. Physical Exam  Vitals reviewed. Constitutional:       Appearance: Normal appearance. She is normal weight. HENT:      Head: Normocephalic and atraumatic. Right Ear: Tympanic membrane, ear canal and external ear normal.      Left Ear: Tympanic membrane, ear canal and external ear normal.      Nose: Nose normal.      Mouth/Throat:      Mouth: Mucous membranes are moist.      Pharynx: Oropharynx is clear. Eyes:      Extraocular Movements: Extraocular movements intact. Pupils: Pupils are equal, round, and reactive to light. Comments: Periorbital edema and erythema   Cardiovascular:      Rate and Rhythm: Normal rate and regular rhythm.    Pulmonary:      Effort: Pulmonary effort is normal.      Breath

## 2023-05-12 ENCOUNTER — HOSPITAL ENCOUNTER (OUTPATIENT)
Age: 63
Setting detail: OUTPATIENT SURGERY
Discharge: HOME | End: 2023-05-12
Attending: OTOLARYNGOLOGY | Admitting: OTOLARYNGOLOGY
Payer: COMMERCIAL

## 2023-05-12 ENCOUNTER — ANESTHESIA (OUTPATIENT)
Dept: OPERATING ROOM | Age: 63
End: 2023-05-12
Payer: COMMERCIAL

## 2023-05-12 ENCOUNTER — ANESTHESIA EVENT (OUTPATIENT)
Dept: OPERATING ROOM | Age: 63
End: 2023-05-12
Payer: COMMERCIAL

## 2023-05-12 VITALS
SYSTOLIC BLOOD PRESSURE: 155 MMHG | RESPIRATION RATE: 16 BRPM | HEART RATE: 67 BPM | BODY MASS INDEX: 23.99 KG/M2 | TEMPERATURE: 98.9 F | WEIGHT: 144 LBS | OXYGEN SATURATION: 95 % | HEIGHT: 65 IN | DIASTOLIC BLOOD PRESSURE: 84 MMHG

## 2023-05-12 DIAGNOSIS — G89.18 POSTOPERATIVE PAIN: Primary | ICD-10-CM

## 2023-05-12 PROCEDURE — 3600000014 HC SURGERY LEVEL 4 ADDTL 15MIN: Performed by: OTOLARYNGOLOGY

## 2023-05-12 PROCEDURE — 7100000000 HC PACU RECOVERY - FIRST 15 MIN: Performed by: OTOLARYNGOLOGY

## 2023-05-12 PROCEDURE — 31254 NSL/SINS NDSC W/PRTL ETHMDCT: CPT | Performed by: OTOLARYNGOLOGY

## 2023-05-12 PROCEDURE — 6360000002 HC RX W HCPCS: Performed by: NURSE ANESTHETIST, CERTIFIED REGISTERED

## 2023-05-12 PROCEDURE — 6360000002 HC RX W HCPCS: Performed by: ANESTHESIOLOGY

## 2023-05-12 PROCEDURE — 2500000003 HC RX 250 WO HCPCS: Performed by: NURSE ANESTHETIST, CERTIFIED REGISTERED

## 2023-05-12 PROCEDURE — 6370000000 HC RX 637 (ALT 250 FOR IP): Performed by: OTOLARYNGOLOGY

## 2023-05-12 PROCEDURE — 2709999900 HC NON-CHARGEABLE SUPPLY: Performed by: OTOLARYNGOLOGY

## 2023-05-12 PROCEDURE — 7100000001 HC PACU RECOVERY - ADDTL 15 MIN: Performed by: OTOLARYNGOLOGY

## 2023-05-12 PROCEDURE — 2720000010 HC SURG SUPPLY STERILE: Performed by: OTOLARYNGOLOGY

## 2023-05-12 PROCEDURE — A4216 STERILE WATER/SALINE, 10 ML: HCPCS | Performed by: ANESTHESIOLOGY

## 2023-05-12 PROCEDURE — 3600000004 HC SURGERY LEVEL 4 BASE: Performed by: OTOLARYNGOLOGY

## 2023-05-12 PROCEDURE — 2580000003 HC RX 258: Performed by: ANESTHESIOLOGY

## 2023-05-12 PROCEDURE — 7100000010 HC PHASE II RECOVERY - FIRST 15 MIN: Performed by: OTOLARYNGOLOGY

## 2023-05-12 PROCEDURE — 3700000001 HC ADD 15 MINUTES (ANESTHESIA): Performed by: OTOLARYNGOLOGY

## 2023-05-12 PROCEDURE — 2500000003 HC RX 250 WO HCPCS: Performed by: ANESTHESIOLOGY

## 2023-05-12 PROCEDURE — 31267 ENDOSCOPY MAXILLARY SINUS: CPT | Performed by: OTOLARYNGOLOGY

## 2023-05-12 PROCEDURE — 2500000003 HC RX 250 WO HCPCS: Performed by: OTOLARYNGOLOGY

## 2023-05-12 PROCEDURE — 7100000011 HC PHASE II RECOVERY - ADDTL 15 MIN: Performed by: OTOLARYNGOLOGY

## 2023-05-12 PROCEDURE — 3700000000 HC ANESTHESIA ATTENDED CARE: Performed by: OTOLARYNGOLOGY

## 2023-05-12 PROCEDURE — 61782 SCAN PROC CRANIAL EXTRA: CPT | Performed by: OTOLARYNGOLOGY

## 2023-05-12 RX ORDER — SODIUM CHLORIDE 0.9 % (FLUSH) 0.9 %
5-40 SYRINGE (ML) INJECTION PRN
Status: DISCONTINUED | OUTPATIENT
Start: 2023-05-12 | End: 2023-05-12 | Stop reason: HOSPADM

## 2023-05-12 RX ORDER — SODIUM CHLORIDE 0.9 % (FLUSH) 0.9 %
5-40 SYRINGE (ML) INJECTION EVERY 12 HOURS SCHEDULED
Status: DISCONTINUED | OUTPATIENT
Start: 2023-05-12 | End: 2023-05-12 | Stop reason: HOSPADM

## 2023-05-12 RX ORDER — FENTANYL CITRATE 50 UG/ML
50 INJECTION, SOLUTION INTRAMUSCULAR; INTRAVENOUS EVERY 5 MIN PRN
Status: DISCONTINUED | OUTPATIENT
Start: 2023-05-12 | End: 2023-05-12 | Stop reason: HOSPADM

## 2023-05-12 RX ORDER — LIDOCAINE HYDROCHLORIDE 10 MG/ML
1 INJECTION, SOLUTION EPIDURAL; INFILTRATION; INTRACAUDAL; PERINEURAL
Status: DISCONTINUED | OUTPATIENT
Start: 2023-05-12 | End: 2023-05-12 | Stop reason: HOSPADM

## 2023-05-12 RX ORDER — PROPOFOL 10 MG/ML
INJECTION, EMULSION INTRAVENOUS PRN
Status: DISCONTINUED | OUTPATIENT
Start: 2023-05-12 | End: 2023-05-12 | Stop reason: SDUPTHER

## 2023-05-12 RX ORDER — DEXAMETHASONE SODIUM PHOSPHATE 4 MG/ML
INJECTION, SOLUTION INTRA-ARTICULAR; INTRALESIONAL; INTRAMUSCULAR; INTRAVENOUS; SOFT TISSUE PRN
Status: DISCONTINUED | OUTPATIENT
Start: 2023-05-12 | End: 2023-05-12 | Stop reason: SDUPTHER

## 2023-05-12 RX ORDER — DIPHENHYDRAMINE HYDROCHLORIDE 50 MG/ML
12.5 INJECTION INTRAMUSCULAR; INTRAVENOUS
Status: DISCONTINUED | OUTPATIENT
Start: 2023-05-12 | End: 2023-05-12 | Stop reason: HOSPADM

## 2023-05-12 RX ORDER — LIDOCAINE HYDROCHLORIDE 10 MG/ML
INJECTION, SOLUTION INFILTRATION; PERINEURAL PRN
Status: DISCONTINUED | OUTPATIENT
Start: 2023-05-12 | End: 2023-05-12 | Stop reason: SDUPTHER

## 2023-05-12 RX ORDER — ONDANSETRON 2 MG/ML
INJECTION INTRAMUSCULAR; INTRAVENOUS PRN
Status: DISCONTINUED | OUTPATIENT
Start: 2023-05-12 | End: 2023-05-12 | Stop reason: SDUPTHER

## 2023-05-12 RX ORDER — SODIUM CHLORIDE, SODIUM LACTATE, POTASSIUM CHLORIDE, CALCIUM CHLORIDE 600; 310; 30; 20 MG/100ML; MG/100ML; MG/100ML; MG/100ML
INJECTION, SOLUTION INTRAVENOUS CONTINUOUS
Status: DISCONTINUED | OUTPATIENT
Start: 2023-05-12 | End: 2023-05-12 | Stop reason: HOSPADM

## 2023-05-12 RX ORDER — SODIUM CHLORIDE 9 MG/ML
INJECTION, SOLUTION INTRAVENOUS PRN
Status: DISCONTINUED | OUTPATIENT
Start: 2023-05-12 | End: 2023-05-12 | Stop reason: HOSPADM

## 2023-05-12 RX ORDER — ROCURONIUM BROMIDE 10 MG/ML
INJECTION, SOLUTION INTRAVENOUS PRN
Status: DISCONTINUED | OUTPATIENT
Start: 2023-05-12 | End: 2023-05-12 | Stop reason: SDUPTHER

## 2023-05-12 RX ORDER — OXYMETAZOLINE HYDROCHLORIDE 0.05 G/100ML
SPRAY NASAL PRN
Status: DISCONTINUED | OUTPATIENT
Start: 2023-05-12 | End: 2023-05-12 | Stop reason: ALTCHOICE

## 2023-05-12 RX ORDER — SODIUM CHLORIDE 0.9 % (FLUSH) 0.9 %
3 SYRINGE (ML) INJECTION PRN
Status: CANCELLED | OUTPATIENT
Start: 2023-05-12

## 2023-05-12 RX ORDER — CEFAZOLIN SODIUM 1 G/3ML
INJECTION, POWDER, FOR SOLUTION INTRAMUSCULAR; INTRAVENOUS PRN
Status: DISCONTINUED | OUTPATIENT
Start: 2023-05-12 | End: 2023-05-12 | Stop reason: SDUPTHER

## 2023-05-12 RX ORDER — AMOXICILLIN AND CLAVULANATE POTASSIUM 875; 125 MG/1; MG/1
1 TABLET, FILM COATED ORAL 2 TIMES DAILY
Qty: 20 TABLET | Refills: 0 | Status: SHIPPED | OUTPATIENT
Start: 2023-05-12 | End: 2023-05-22

## 2023-05-12 RX ORDER — MIDAZOLAM HYDROCHLORIDE 2 MG/2ML
2 INJECTION, SOLUTION INTRAMUSCULAR; INTRAVENOUS
Status: DISCONTINUED | OUTPATIENT
Start: 2023-05-12 | End: 2023-05-12 | Stop reason: HOSPADM

## 2023-05-12 RX ORDER — OXYCODONE HYDROCHLORIDE AND ACETAMINOPHEN 5; 325 MG/1; MG/1
1 TABLET ORAL EVERY 6 HOURS PRN
Qty: 28 TABLET | Refills: 0 | Status: SHIPPED | OUTPATIENT
Start: 2023-05-12 | End: 2023-05-19

## 2023-05-12 RX ORDER — FENTANYL CITRATE 50 UG/ML
25 INJECTION, SOLUTION INTRAMUSCULAR; INTRAVENOUS
Status: DISCONTINUED | OUTPATIENT
Start: 2023-05-12 | End: 2023-05-12 | Stop reason: HOSPADM

## 2023-05-12 RX ORDER — FENTANYL CITRATE 50 UG/ML
25 INJECTION, SOLUTION INTRAMUSCULAR; INTRAVENOUS EVERY 5 MIN PRN
Status: DISCONTINUED | OUTPATIENT
Start: 2023-05-12 | End: 2023-05-12 | Stop reason: HOSPADM

## 2023-05-12 RX ORDER — ONDANSETRON 2 MG/ML
4 INJECTION INTRAMUSCULAR; INTRAVENOUS
Status: DISCONTINUED | OUTPATIENT
Start: 2023-05-12 | End: 2023-05-12 | Stop reason: HOSPADM

## 2023-05-12 RX ORDER — DEXAMETHASONE SODIUM PHOSPHATE 4 MG/ML
4 INJECTION, SOLUTION INTRA-ARTICULAR; INTRALESIONAL; INTRAMUSCULAR; INTRAVENOUS; SOFT TISSUE ONCE
Status: COMPLETED | OUTPATIENT
Start: 2023-05-12 | End: 2023-05-12

## 2023-05-12 RX ORDER — LIDOCAINE HYDROCHLORIDE AND EPINEPHRINE 10; 10 MG/ML; UG/ML
INJECTION, SOLUTION INFILTRATION; PERINEURAL PRN
Status: DISCONTINUED | OUTPATIENT
Start: 2023-05-12 | End: 2023-05-12 | Stop reason: ALTCHOICE

## 2023-05-12 RX ORDER — FENTANYL CITRATE 50 UG/ML
50 INJECTION, SOLUTION INTRAMUSCULAR; INTRAVENOUS
Status: DISCONTINUED | OUTPATIENT
Start: 2023-05-12 | End: 2023-05-12 | Stop reason: HOSPADM

## 2023-05-12 RX ORDER — FENTANYL CITRATE 50 UG/ML
INJECTION, SOLUTION INTRAMUSCULAR; INTRAVENOUS PRN
Status: DISCONTINUED | OUTPATIENT
Start: 2023-05-12 | End: 2023-05-12 | Stop reason: SDUPTHER

## 2023-05-12 RX ORDER — PROCHLORPERAZINE EDISYLATE 5 MG/ML
5 INJECTION INTRAMUSCULAR; INTRAVENOUS
Status: DISCONTINUED | OUTPATIENT
Start: 2023-05-12 | End: 2023-05-12 | Stop reason: HOSPADM

## 2023-05-12 RX ADMIN — ROCURONIUM BROMIDE 50 MG: 10 INJECTION, SOLUTION INTRAVENOUS at 16:05

## 2023-05-12 RX ADMIN — FENTANYL CITRATE 100 MCG: 0.05 INJECTION, SOLUTION INTRAMUSCULAR; INTRAVENOUS at 16:05

## 2023-05-12 RX ADMIN — SODIUM CHLORIDE, SODIUM LACTATE, POTASSIUM CHLORIDE, AND CALCIUM CHLORIDE: 600; 310; 30; 20 INJECTION, SOLUTION INTRAVENOUS at 14:04

## 2023-05-12 RX ADMIN — DEXAMETHASONE SODIUM PHOSPHATE 10 MG: 4 INJECTION, SOLUTION INTRAMUSCULAR; INTRAVENOUS at 16:10

## 2023-05-12 RX ADMIN — CEFAZOLIN SODIUM 2 G: 1 INJECTION, POWDER, FOR SOLUTION INTRAMUSCULAR; INTRAVENOUS at 16:10

## 2023-05-12 RX ADMIN — ONDANSETRON 4 MG: 2 INJECTION INTRAMUSCULAR; INTRAVENOUS at 17:05

## 2023-05-12 RX ADMIN — LIDOCAINE HYDROCHLORIDE 50 MG: 10 INJECTION, SOLUTION INFILTRATION; PERINEURAL at 16:05

## 2023-05-12 RX ADMIN — FAMOTIDINE 20 MG: 10 INJECTION, SOLUTION INTRAVENOUS at 14:17

## 2023-05-12 RX ADMIN — SUGAMMADEX 200 MG: 100 INJECTION, SOLUTION INTRAVENOUS at 17:17

## 2023-05-12 RX ADMIN — PROPOFOL 150 MG: 10 INJECTION, EMULSION INTRAVENOUS at 16:05

## 2023-05-12 RX ADMIN — PHENYLEPHRINE HYDROCHLORIDE 160 MCG: 10 INJECTION INTRAVENOUS at 16:18

## 2023-05-12 RX ADMIN — SODIUM CHLORIDE, SODIUM LACTATE, POTASSIUM CHLORIDE, AND CALCIUM CHLORIDE: 600; 310; 30; 20 INJECTION, SOLUTION INTRAVENOUS at 16:46

## 2023-05-12 RX ADMIN — DEXAMETHASONE SODIUM PHOSPHATE 4 MG: 4 INJECTION, SOLUTION INTRAMUSCULAR; INTRAVENOUS at 14:17

## 2023-05-12 ASSESSMENT — PAIN DESCRIPTION - PAIN TYPE
TYPE: SURGICAL PAIN
TYPE: SURGICAL PAIN

## 2023-05-12 ASSESSMENT — PAIN DESCRIPTION - LOCATION
LOCATION: NOSE
LOCATION: NOSE

## 2023-05-12 ASSESSMENT — ENCOUNTER SYMPTOMS: SHORTNESS OF BREATH: 1

## 2023-05-12 ASSESSMENT — PAIN DESCRIPTION - DESCRIPTORS
DESCRIPTORS: ACHING
DESCRIPTORS: ACHING

## 2023-05-12 ASSESSMENT — PAIN - FUNCTIONAL ASSESSMENT: PAIN_FUNCTIONAL_ASSESSMENT: 0-10

## 2023-05-12 ASSESSMENT — PAIN SCALES - GENERAL
PAINLEVEL_OUTOF10: 2
PAINLEVEL_OUTOF10: 2

## 2023-05-12 ASSESSMENT — PAIN DESCRIPTION - ORIENTATION: ORIENTATION: LEFT

## 2023-05-12 ASSESSMENT — LIFESTYLE VARIABLES: SMOKING_STATUS: 0

## 2023-05-12 NOTE — BRIEF OP NOTE
Brief Postoperative Note      Patient: Thony Trotter  YOB: 1960  MRN: [id number]    Date of Procedure: 5/12/2023    Pre-Op Diagnosis Codes:     * Chronic sinusitis, unspecified location [J32.9]    Post-Op Diagnosis: Same       Procedure(s):  SINUS ENDOSCOPY MAXILLARY ANTROSTOMY WITH TISSUE REMOVAL ALONG WITH MAXILLARY & ANTERIOR ETHMOIDECTOMY & POLYPS REMOVAL WITH NAVIGATION    Surgeon(s):  Gilda Flowers MD    Assistant:  First Assistant: Janette Nice    Anesthesia: General    Estimated Blood Loss (mL): Minimal    Complications: None    Specimens:   * No specimens in log *    Implants:  * No implants in log *      Drains: * No LDAs found *    Findings: Significant polyposis      Electronically signed by Gilda Flowers MD on 5/12/2023 at 5:40 PM

## 2023-05-12 NOTE — OP NOTE
Operative Note      Patient: Gabriela Nevarez  YOB: 1960  MRN: [id number]    Date of Procedure: 5/12/2023    Pre-Op Diagnosis Codes:     * Chronic sinusitis, unspecified location [J32.9]    Post-Op Diagnosis: Same       Procedure(s):  SINUS ENDOSCOPY MAXILLARY ANTROSTOMY WITH TISSUE REMOVAL ALONG WITH MAXILLARY & ANTERIOR ETHMOIDECTOMY & POLYPS REMOVAL WITH NAVIGATION    Surgeon(s):  Karolina Bradley MD    Assistant:   First Assistant: Julio Laird    Anesthesia: General    Estimated Blood Loss (mL): Minimal    Complications: None    Specimens:   * No specimens in log *    Implants:  * No implants in log *      Drains: * No LDAs found *    Findings: Significant polyposis      Detailed Description of Procedure:   After obtaining informed consent, the patient was taken to the operating room and placed In table supine position. After induction of general endotracheal anesthesia the patient was prepped standard fashion for sinus surgery. Was a timeout was performed Afrin-soaked pledgets were placed bilaterally for 5 minutes. Next the sinus navigation system was set up. Next a 0 degree endoscope was used to examine the left nasal cavity. Polyps were found throughout the nasal cavity obstructing just about everything. Using a microdebrider the polyps removed from the inferior aspect of the nasal cavity and then proceeded superiorly. Polyps was then tracked into the middle meatus and the maxillary sinus. These polyps were removed as well. Polyps was then removed from the anterior ethmoid as this was opened up as well. Once complete attention was directed to the left nasal cavity. Again polyps were found throughout the nasal cavity and removed from inferiorly to superiorly. The middle meatus was identified and polyps were removed. Maxillary os was enlarged and purulent fluid was removed as well. The anterior ethmoid was entered and polyps were removed from there as well.   Once complete the

## 2023-05-12 NOTE — ANESTHESIA POSTPROCEDURE EVALUATION
Department of Anesthesiology  Postprocedure Note    Patient: Devin Moreno  MRN: [id number]  YOB: 1960  Date of evaluation: 5/12/2023      Procedure Summary     Date: 05/12/23 Room / Location: [address]    Anesthesia Start: 1600 Anesthesia Stop: 4838    Procedure: SINUS ENDOSCOPY MAXILLARY ANTROSTOMY WITH TISSUE REMOVAL ALONG WITH MAXILLARY & ANTERIOR ETHMOIDECTOMY & POLYPS REMOVAL WITH NAVIGATION Diagnosis:       Chronic sinusitis, unspecified location      (Chronic sinusitis, unspecified location [J32.9])    Surgeons: Ric Kulkarni MD Responsible Provider: RENNY Schmidt CRNA    Anesthesia Type: general ASA Status: 3          Anesthesia Type: No value filed.     Jennifer Phase I: Jennifer Score: 9    Jennifer Phase II:        Anesthesia Post Evaluation    Patient location during evaluation: PACU  Patient participation: complete - patient participated  Level of consciousness: sleepy but conscious  Pain score: 0  Airway patency: patent  Nausea & Vomiting: no nausea and no vomiting  Complications: no  Cardiovascular status: hemodynamically stable  Respiratory status: acceptable  Hydration status: stable  Comments: /86   Pulse 62   Temp 97.2 °F (36.2 °C) (Temporal)   Resp (!) 7   Ht 5' 5\" (1.651 m)   Wt 144 lb (65.3 kg)   SpO2 92%   BMI 23.96 kg/m²

## 2023-05-12 NOTE — ANESTHESIA PRE PROCEDURE
Dr Aga [id number], benign appearing stricture in the distal esophagus,  small gastric inlet patch in the upper esophagus, no Cole's, HH-Gastritis, no h pylori    OVARY REMOVAL Right     TONSILLECTOMY      UPPER GASTROINTESTINAL ENDOSCOPY N/A 03/23/2023    Dr Aga Torres-[id number], benign appearing stricture in the distal esophagus,  small gastric inlet patch in the upper esophagus, no Cole's, HH-Gastritis, no h pylori    US THYROID BIOPSY  10/19/2022    US THYROID BIOPSY 10/19/2022 [facility]       Social History:    Social History     Tobacco Use    Smoking status: Never    Smokeless tobacco: Never   Substance Use Topics    Alcohol use: Yes     Comment: occ                                Counseling given: Not Answered      Vital Signs (Current):   Vitals:    05/12/23 1354   BP: 134/86   Pulse: 70   Resp: 15   Temp: 97.2 °F (36.2 °C)   TempSrc: Temporal   Weight: 144 lb (65.3 kg)   Height: 5' 5\" (1.651 m)                                              BP Readings from Last 3 Encounters:   05/12/23 134/86   05/03/23 124/82   04/10/23 118/82       NPO Status: Time of last liquid consumption: 0400                        Time of last solid consumption: 0400 (cereal and bananna)                        Date of last liquid consumption: 05/12/23                        Date of last solid food consumption: 05/12/23    BMI:   Wt Readings from Last 3 Encounters:   05/12/23 144 lb (65.3 kg)   05/03/23 140 lb (63.5 kg)   05/03/23 140 lb (63.5 kg)     Body mass index is 23.96 kg/m².     CBC:   Lab Results   Component Value Date/Time    WBC 11.9 05/03/2023 08:15 AM    RBC 4.78 05/03/2023 08:15 AM    HGB 12.5 05/03/2023 08:15 AM    HCT 40.1 05/03/2023 08:15 AM    MCV 83.9 05/03/2023 08:15 AM    RDW 15.3 05/03/2023 08:15 AM     05/03/2023 08:15 AM       CMP:   Lab Results   Component Value Date/Time     05/03/2023 08:15 AM     08/02/2011 08:31 AM    K 3.3 05/03/2023 08:15 AM

## 2023-05-12 NOTE — INTERVAL H&P NOTE
Update History & Physical    The patient's History and Physical of May 3, 2023 was reviewed with the patient and I examined the patient. There was no change. The surgical site was confirmed by the patient and me. Plan: The risks, benefits, expected outcome, and alternative to the recommended procedure have been discussed with the patient. Patient understands and wants to proceed with the procedure.      Electronically signed by Gilda Flowers MD on 5/12/2023 at 3:32 PM

## 2023-05-12 NOTE — DISCHARGE INSTRUCTIONS
Please call Dr. Dara Martinez with questions or concerns. Pain meds as needed and take it easy for the next week. Follow-up in 2 weeks in clinic. Antibiotics until they are complete.

## 2023-05-30 ENCOUNTER — OFFICE VISIT (OUTPATIENT)
Dept: ENT CLINIC | Age: 63
End: 2023-05-30
Payer: COMMERCIAL

## 2023-05-30 VITALS
SYSTOLIC BLOOD PRESSURE: 120 MMHG | DIASTOLIC BLOOD PRESSURE: 74 MMHG | HEIGHT: 65 IN | WEIGHT: 148 LBS | BODY MASS INDEX: 24.66 KG/M2

## 2023-05-30 DIAGNOSIS — Z91.09 ENVIRONMENTAL ALLERGIES: ICD-10-CM

## 2023-05-30 DIAGNOSIS — J32.9 CHRONIC SINUSITIS, UNSPECIFIED LOCATION: Primary | ICD-10-CM

## 2023-05-30 PROCEDURE — 3074F SYST BP LT 130 MM HG: CPT | Performed by: OTOLARYNGOLOGY

## 2023-05-30 PROCEDURE — 31231 NASAL ENDOSCOPY DX: CPT | Performed by: OTOLARYNGOLOGY

## 2023-05-30 PROCEDURE — 3078F DIAST BP <80 MM HG: CPT | Performed by: OTOLARYNGOLOGY

## 2023-05-30 PROCEDURE — 99214 OFFICE O/P EST MOD 30 MIN: CPT | Performed by: OTOLARYNGOLOGY

## 2023-05-30 RX ORDER — MONTELUKAST SODIUM 10 MG/1
10 TABLET ORAL NIGHTLY
Qty: 30 TABLET | Refills: 5 | Status: SHIPPED | OUTPATIENT
Start: 2023-05-30

## 2023-05-30 RX ORDER — AZELASTINE 1 MG/ML
2 SPRAY, METERED NASAL 2 TIMES DAILY
Qty: 60 ML | Refills: 5 | Status: SHIPPED | OUTPATIENT
Start: 2023-05-30

## 2023-05-30 RX ORDER — PREDNISONE 20 MG/1
TABLET ORAL
Qty: 21 TABLET | Refills: 0 | Status: SHIPPED | OUTPATIENT
Start: 2023-05-30

## 2023-05-30 RX ORDER — AMOXICILLIN AND CLAVULANATE POTASSIUM 875; 125 MG/1; MG/1
1 TABLET, FILM COATED ORAL 2 TIMES DAILY
Qty: 20 TABLET | Refills: 0 | Status: SHIPPED | OUTPATIENT
Start: 2023-05-30 | End: 2023-06-09

## 2023-05-30 ASSESSMENT — ENCOUNTER SYMPTOMS
ALLERGIC/IMMUNOLOGIC NEGATIVE: 1
EYES NEGATIVE: 1
GASTROINTESTINAL NEGATIVE: 1
RESPIRATORY NEGATIVE: 1

## 2023-05-30 NOTE — PROGRESS NOTES
2023    Holly Jackson (:  1960) is a 60 y.o. female, Established patient, here for evaluation of the following chief complaint(s):  Post-Op Check (Chronic pansinusitis)      Vitals:    23 0846   BP: 120/74   Weight: 148 lb (67.1 kg)   Height: 5' 5\" (1.651 m)       Wt Readings from Last 3 Encounters:   23 148 lb (67.1 kg)   23 144 lb (65.3 kg)   23 140 lb (63.5 kg)       BP Readings from Last 3 Encounters:   23 120/74   23 (!) 155/84   23 124/82         SUBJECTIVE/OBJECTIVE:    Patient seen today for chronic sinusitis. I took her to the OR about 3 to 4 weeks ago and she has significant polyposis throughout her nasal cavity. I removed many of these polyps and open up her sinuses. She still reports significant congestion and swelling around her eyes. She does have severe allergies and takes Zyrtec every day. She also uses Dupixent for her asthma. Review of Systems   Constitutional: Negative. HENT:  Positive for congestion. Eyes: Negative. Respiratory: Negative. Cardiovascular: Negative. Gastrointestinal: Negative. Endocrine: Negative. Musculoskeletal: Negative. Skin: Negative. Allergic/Immunologic: Negative. Neurological: Negative. Hematological: Negative. Psychiatric/Behavioral: Negative. Physical Exam  Vitals reviewed. Constitutional:       Appearance: Normal appearance. She is normal weight. HENT:      Head: Normocephalic and atraumatic. Right Ear: Tympanic membrane, ear canal and external ear normal.      Left Ear: Tympanic membrane, ear canal and external ear normal.      Nose: Nose normal.      Mouth/Throat:      Mouth: Mucous membranes are moist.      Pharynx: Oropharynx is clear. Eyes:      Extraocular Movements: Extraocular movements intact. Pupils: Pupils are equal, round, and reactive to light. Cardiovascular:      Rate and Rhythm: Normal rate and regular rhythm.    Pulmonary:

## 2023-06-20 ENCOUNTER — TELEPHONE (OUTPATIENT)
Dept: ENT CLINIC | Age: 63
End: 2023-06-20

## 2023-06-20 RX ORDER — DOXYCYCLINE HYCLATE 100 MG
100 TABLET ORAL 2 TIMES DAILY
Qty: 28 TABLET | Refills: 0 | Status: SHIPPED | OUTPATIENT
Start: 2023-06-20 | End: 2023-07-04

## 2023-06-20 NOTE — TELEPHONE ENCOUNTER
Pt LVM stating she had the feeling of sinus infection and inquired whether she needed abx. I returned call with no answer and lvm for pt to call back to let us know if she needed abx and what pharmacy she would prefer.

## 2023-06-29 ENCOUNTER — OFFICE VISIT (OUTPATIENT)
Dept: ENT CLINIC | Age: 63
End: 2023-06-29
Payer: COMMERCIAL

## 2023-06-29 VITALS
WEIGHT: 149 LBS | HEIGHT: 65 IN | BODY MASS INDEX: 24.83 KG/M2 | SYSTOLIC BLOOD PRESSURE: 132 MMHG | DIASTOLIC BLOOD PRESSURE: 80 MMHG

## 2023-06-29 DIAGNOSIS — J33.9 NASAL POLYPS: ICD-10-CM

## 2023-06-29 DIAGNOSIS — J32.9 CHRONIC SINUSITIS, UNSPECIFIED LOCATION: Primary | ICD-10-CM

## 2023-06-29 DIAGNOSIS — Z91.09 ENVIRONMENTAL ALLERGIES: ICD-10-CM

## 2023-06-29 PROCEDURE — 3079F DIAST BP 80-89 MM HG: CPT | Performed by: OTOLARYNGOLOGY

## 2023-06-29 PROCEDURE — 3075F SYST BP GE 130 - 139MM HG: CPT | Performed by: OTOLARYNGOLOGY

## 2023-06-29 PROCEDURE — 99213 OFFICE O/P EST LOW 20 MIN: CPT | Performed by: OTOLARYNGOLOGY

## 2023-06-29 ASSESSMENT — ENCOUNTER SYMPTOMS
ALLERGIC/IMMUNOLOGIC NEGATIVE: 1
GASTROINTESTINAL NEGATIVE: 1
EYES NEGATIVE: 1
RESPIRATORY NEGATIVE: 1

## 2023-07-17 ENCOUNTER — OFFICE VISIT (OUTPATIENT)
Dept: INTERNAL MEDICINE | Age: 63
End: 2023-07-17
Payer: COMMERCIAL

## 2023-07-17 VITALS
HEIGHT: 65 IN | SYSTOLIC BLOOD PRESSURE: 130 MMHG | RESPIRATION RATE: 18 BRPM | HEART RATE: 70 BPM | BODY MASS INDEX: 24.66 KG/M2 | OXYGEN SATURATION: 95 % | WEIGHT: 148 LBS | DIASTOLIC BLOOD PRESSURE: 88 MMHG

## 2023-07-17 DIAGNOSIS — E78.00 PURE HYPERCHOLESTEROLEMIA: ICD-10-CM

## 2023-07-17 DIAGNOSIS — I10 ESSENTIAL HYPERTENSION: ICD-10-CM

## 2023-07-17 DIAGNOSIS — E55.9 VITAMIN D DEFICIENCY: ICD-10-CM

## 2023-07-17 DIAGNOSIS — L30.0 NUMMULAR ECZEMA: ICD-10-CM

## 2023-07-17 DIAGNOSIS — G89.29 CHRONIC PAIN OF BOTH KNEES: ICD-10-CM

## 2023-07-17 DIAGNOSIS — E55.9 VITAMIN D DEFICIENCY: Primary | ICD-10-CM

## 2023-07-17 DIAGNOSIS — S22.32XB OPEN FRACTURE OF ONE RIB OF LEFT SIDE, INITIAL ENCOUNTER: ICD-10-CM

## 2023-07-17 DIAGNOSIS — M25.562 CHRONIC PAIN OF BOTH KNEES: ICD-10-CM

## 2023-07-17 DIAGNOSIS — S22.050S: ICD-10-CM

## 2023-07-17 DIAGNOSIS — F51.01 PRIMARY INSOMNIA: ICD-10-CM

## 2023-07-17 DIAGNOSIS — R09.1 PLEURISY: ICD-10-CM

## 2023-07-17 DIAGNOSIS — J40 EOSINOPHILIC BRONCHITIS: ICD-10-CM

## 2023-07-17 DIAGNOSIS — Z12.4 ENCOUNTER FOR PAPANICOLAOU SMEAR FOR CERVICAL CANCER SCREENING: ICD-10-CM

## 2023-07-17 DIAGNOSIS — R31.29 MICROHEMATURIA: ICD-10-CM

## 2023-07-17 DIAGNOSIS — Z00.00 ANNUAL PHYSICAL EXAM: ICD-10-CM

## 2023-07-17 DIAGNOSIS — R79.89 ABNORMAL CBC: ICD-10-CM

## 2023-07-17 DIAGNOSIS — M25.561 CHRONIC PAIN OF BOTH KNEES: ICD-10-CM

## 2023-07-17 DIAGNOSIS — Z12.31 ENCOUNTER FOR SCREENING MAMMOGRAM FOR BREAST CANCER: ICD-10-CM

## 2023-07-17 DIAGNOSIS — D72.18 EOSINOPHILIC BRONCHITIS: ICD-10-CM

## 2023-07-17 LAB
25(OH)D3 SERPL-MCNC: 33.4 NG/ML
ALBUMIN SERPL-MCNC: 3.9 G/DL (ref 3.5–5.2)
ALP SERPL-CCNC: 67 U/L (ref 35–104)
ALT SERPL-CCNC: 11 U/L (ref 5–33)
ANION GAP SERPL CALCULATED.3IONS-SCNC: 7 MMOL/L (ref 7–19)
AST SERPL-CCNC: 19 U/L (ref 5–32)
BASOPHILS # BLD: 0.2 K/UL (ref 0–0.2)
BASOPHILS NFR BLD: 2 % (ref 0–1)
BILIRUB SERPL-MCNC: 0.8 MG/DL (ref 0.2–1.2)
BUN SERPL-MCNC: 14 MG/DL (ref 8–23)
CALCIUM SERPL-MCNC: 9.6 MG/DL (ref 8.8–10.2)
CHLORIDE SERPL-SCNC: 105 MMOL/L (ref 98–111)
CHOLEST SERPL-MCNC: 167 MG/DL (ref 160–199)
CO2 SERPL-SCNC: 29 MMOL/L (ref 22–29)
CREAT SERPL-MCNC: 0.9 MG/DL (ref 0.5–0.9)
EOSINOPHIL # BLD: 2.29 K/UL (ref 0–0.6)
EOSINOPHIL NFR BLD: 29 % (ref 0–5)
ERYTHROCYTE [DISTWIDTH] IN BLOOD BY AUTOMATED COUNT: 15.2 % (ref 11.5–14.5)
GLUCOSE SERPL-MCNC: 89 MG/DL (ref 74–109)
HBA1C MFR BLD: 4.5 % (ref 4–6)
HCT VFR BLD AUTO: 44.7 % (ref 37–47)
HDLC SERPL-MCNC: 57 MG/DL (ref 65–121)
HGB BLD-MCNC: 13.8 G/DL (ref 12–16)
IMM GRANULOCYTES # BLD: 0 K/UL
LDLC SERPL CALC-MCNC: 88 MG/DL
LYMPHOCYTES # BLD: 1.7 K/UL (ref 1.1–4.5)
LYMPHOCYTES NFR BLD: 22 % (ref 20–40)
MCH RBC QN AUTO: 27.2 PG (ref 27–31)
MCHC RBC AUTO-ENTMCNC: 30.9 G/DL (ref 33–37)
MCV RBC AUTO: 88 FL (ref 81–99)
MONOCYTES # BLD: 0.3 K/UL (ref 0–0.9)
MONOCYTES NFR BLD: 4 % (ref 0–10)
NEUTROPHILS # BLD: 3.4 K/UL (ref 1.5–7.5)
NEUTS BAND NFR BLD MANUAL: 1 % (ref 0–5)
NEUTS SEG NFR BLD: 42 % (ref 50–65)
PLATELET # BLD AUTO: 297 K/UL (ref 130–400)
PLATELET SLIDE REVIEW: ADEQUATE
PMV BLD AUTO: 8.9 FL (ref 9.4–12.3)
POTASSIUM SERPL-SCNC: 5 MMOL/L (ref 3.5–5)
PROT SERPL-MCNC: 7.3 G/DL (ref 6.6–8.7)
RBC # BLD AUTO: 5.08 M/UL (ref 4.2–5.4)
RBC MORPH BLD: NORMAL
SODIUM SERPL-SCNC: 141 MMOL/L (ref 136–145)
TRIGL SERPL-MCNC: 111 MG/DL (ref 0–149)
WBC # BLD AUTO: 7.9 K/UL (ref 4.8–10.8)

## 2023-07-17 PROCEDURE — 3075F SYST BP GE 130 - 139MM HG: CPT | Performed by: INTERNAL MEDICINE

## 2023-07-17 PROCEDURE — 99214 OFFICE O/P EST MOD 30 MIN: CPT | Performed by: INTERNAL MEDICINE

## 2023-07-17 PROCEDURE — 3079F DIAST BP 80-89 MM HG: CPT | Performed by: INTERNAL MEDICINE

## 2023-07-17 RX ORDER — HYDROCODONE BITARTRATE AND ACETAMINOPHEN 7.5; 325 MG/1; MG/1
1 TABLET ORAL DAILY PRN
Qty: 30 TABLET | Refills: 0 | Status: SHIPPED | OUTPATIENT
Start: 2023-07-17 | End: 2023-08-16

## 2023-07-17 RX ORDER — TRIAMCINOLONE ACETONIDE 0.25 MG/G
OINTMENT TOPICAL
Qty: 80 G | Refills: 0 | Status: SHIPPED | OUTPATIENT
Start: 2023-07-17 | End: 2023-07-24

## 2023-07-17 RX ORDER — ERGOCALCIFEROL 1.25 MG/1
50000 CAPSULE ORAL WEEKLY
Qty: 4 CAPSULE | Refills: 5 | Status: SHIPPED | OUTPATIENT
Start: 2023-07-17

## 2023-07-17 ASSESSMENT — ENCOUNTER SYMPTOMS
ABDOMINAL PAIN: 0
WHEEZING: 0
CONSTIPATION: 0
SORE THROAT: 0
COUGH: 1
CHEST TIGHTNESS: 0

## 2023-07-17 NOTE — PROGRESS NOTES
Chief Complaint   Patient presents with    6 Month Follow-Up     History of presenting illness:  Kannan Tarango is a56 y.o. female who presents today for follow up on her chronic medical conditions as noted below.       Patient Active Problem List    Diagnosis Date Noted    Chronic sinusitis with recurrent bronchitis 02/21/2023     Priority: Medium    Eosinophilic bronchitis 45/37/9028     Priority: Medium    Esophageal dysphagia 01/25/2023     Priority: Medium    Hilar adenopathy 01/25/2023     Priority: Medium    Moderate COPD (chronic obstructive pulmonary disease) ([address]) 11/30/2022     Priority: Medium    Eosinophilic asthma 50/54/4182     Priority: Medium    Elevated IgE level 11/30/2022     Priority: Medium    Gastroesophageal reflux disease without esophagitis 10/03/2022     Priority: Medium    Hiatal hernia 10/03/2022     Priority: Medium    Long COVID 10/03/2022     Priority: Medium    Shortness of breath 08/12/2022     Priority: Medium    Reactive airway disease 06/29/2022     Priority: Medium    Chronic cough 06/29/2022     Priority: Medium    Chronic pain of both knees 08/01/2018    Facial skin lesion 08/01/2018    Right temporal headache 09/21/2017    Vision changes 09/21/2017    Pure hypercholesterolemia 09/10/2017    Vitamin D deficiency 09/10/2017    Essential hypertension 09/10/2017    Localized osteoporosis without current pathological fracture 09/10/2017     Overview Note:     2/14 Lspine -2.1/ hi neck -2.6; 1/2017 Lspine -1.8; hip neck -2.6  3/2021 hip neck -2.8; L spine -1.8        Rheumatoid arthritis involving both hands with positive rheumatoid factor ([address]) 09/10/2017     Overview Note:     Dx 2011 RA/ CCP ne        Primary insomnia 09/10/2017    Generalized anxiety disorder 09/10/2017    Reactive depression 09/10/2017     Past Medical History:   Diagnosis Date    Chronic sinusitis     Cough     chronic during covid; \"long covid\", \"under control\"    COVID-19 03/2022    chest congestion;

## 2023-07-31 NOTE — TELEPHONE ENCOUNTER
Eugenia Grijalva called requesting a refill of the below medication which has been pended for you:     Requested Prescriptions     Pending Prescriptions Disp Refills    metoprolol succinate (TOPROL XL) 50 MG extended release tablet [Pharmacy Med Name: METOPROLOL ER SUCCINATE 50MG TABS] 90 tablet 1     Sig: TAKE 1 TABLET BY MOUTH DAILY       Last Appointment Date: 7/17/2023  Next Appointment Date: 1/17/2024    Allergies   Allergen Reactions    Morphine And Related Headaches

## 2023-08-01 RX ORDER — METOPROLOL SUCCINATE 50 MG/1
TABLET, EXTENDED RELEASE ORAL
Qty: 90 TABLET | Refills: 1 | Status: SHIPPED | OUTPATIENT
Start: 2023-08-01

## 2023-08-08 ENCOUNTER — OFFICE VISIT (OUTPATIENT)
Dept: ENT CLINIC | Age: 63
End: 2023-08-08
Payer: COMMERCIAL

## 2023-08-08 VITALS
BODY MASS INDEX: 24.49 KG/M2 | SYSTOLIC BLOOD PRESSURE: 132 MMHG | HEIGHT: 65 IN | WEIGHT: 147 LBS | DIASTOLIC BLOOD PRESSURE: 86 MMHG

## 2023-08-08 DIAGNOSIS — Z91.09 ENVIRONMENTAL ALLERGIES: Primary | ICD-10-CM

## 2023-08-08 DIAGNOSIS — J32.9 CHRONIC SINUSITIS, UNSPECIFIED LOCATION: ICD-10-CM

## 2023-08-08 PROCEDURE — 3075F SYST BP GE 130 - 139MM HG: CPT | Performed by: OTOLARYNGOLOGY

## 2023-08-08 PROCEDURE — 99213 OFFICE O/P EST LOW 20 MIN: CPT | Performed by: OTOLARYNGOLOGY

## 2023-08-08 PROCEDURE — 3079F DIAST BP 80-89 MM HG: CPT | Performed by: OTOLARYNGOLOGY

## 2023-08-08 RX ORDER — PREDNISONE 20 MG/1
TABLET ORAL
Qty: 21 TABLET | Refills: 0 | Status: SHIPPED | OUTPATIENT
Start: 2023-08-08

## 2023-08-08 ASSESSMENT — ENCOUNTER SYMPTOMS
GASTROINTESTINAL NEGATIVE: 1
RESPIRATORY NEGATIVE: 1
EYES NEGATIVE: 1
ALLERGIC/IMMUNOLOGIC NEGATIVE: 1

## 2023-08-08 NOTE — PROGRESS NOTES
2023    Rivas Lion (:  1960) is a 60 y.o. female, Established patient, here for evaluation of the following chief complaint(s):  Follow-up (Sinusitis)      Vitals:    23 1549   BP: 132/86   Weight: 147 lb (66.7 kg)   Height: 5' 5\" (1.651 m)       Wt Readings from Last 3 Encounters:   23 147 lb (66.7 kg)   23 148 lb (67.1 kg)   23 149 lb (67.6 kg)       BP Readings from Last 3 Encounters:   23 132/86   23 130/88   23 132/80         SUBJECTIVE/OBJECTIVE:    Patient seen today for sinusitis and allergies. I performed sinus surgery and her symptoms actually worsened after this. She has significant polyposis. She is already on Dupixent. I placed her on antibiotics and set up for allergy testing. No significant allergies but she reports feeling better. She said that side pressure is gone but she still mildly congested. Review of Systems   Constitutional: Negative. HENT:  Positive for congestion. Eyes: Negative. Respiratory: Negative. Cardiovascular: Negative. Gastrointestinal: Negative. Endocrine: Negative. Musculoskeletal: Negative. Skin: Negative. Allergic/Immunologic: Negative. Neurological: Negative. Hematological: Negative. Psychiatric/Behavioral: Negative. Physical Exam  Vitals reviewed. Constitutional:       Appearance: Normal appearance. She is normal weight. HENT:      Head: Normocephalic and atraumatic. Right Ear: Tympanic membrane, ear canal and external ear normal.      Left Ear: Tympanic membrane, ear canal and external ear normal.      Nose: Nose normal.      Mouth/Throat:      Mouth: Mucous membranes are moist.      Pharynx: Oropharynx is clear. Eyes:      Extraocular Movements: Extraocular movements intact. Pupils: Pupils are equal, round, and reactive to light. Cardiovascular:      Rate and Rhythm: Normal rate and regular rhythm.    Pulmonary:      Effort: Pulmonary

## 2023-08-30 RX ORDER — ROSUVASTATIN CALCIUM 10 MG/1
TABLET, COATED ORAL
Qty: 90 TABLET | Refills: 1 | Status: SHIPPED | OUTPATIENT
Start: 2023-08-30

## 2023-08-30 RX ORDER — IBANDRONATE SODIUM 150 MG/1
TABLET, FILM COATED ORAL
Qty: 3 TABLET | Refills: 3 | Status: SHIPPED | OUTPATIENT
Start: 2023-08-30

## 2023-08-30 RX ORDER — FLUOXETINE HYDROCHLORIDE 40 MG/1
CAPSULE ORAL
Qty: 90 CAPSULE | Refills: 1 | Status: SHIPPED | OUTPATIENT
Start: 2023-08-30

## 2023-08-30 RX ORDER — ALBUTEROL SULFATE 90 UG/1
AEROSOL, METERED RESPIRATORY (INHALATION)
Qty: 8.5 G | Refills: 1 | Status: SHIPPED | OUTPATIENT
Start: 2023-08-30

## 2023-10-16 ENCOUNTER — OFFICE VISIT (OUTPATIENT)
Dept: INTERNAL MEDICINE | Age: 63
End: 2023-10-16
Payer: COMMERCIAL

## 2023-10-16 VITALS — HEART RATE: 68 BPM | SYSTOLIC BLOOD PRESSURE: 138 MMHG | DIASTOLIC BLOOD PRESSURE: 68 MMHG | OXYGEN SATURATION: 98 %

## 2023-10-16 DIAGNOSIS — L23.7 POISON IVY: Primary | ICD-10-CM

## 2023-10-16 PROCEDURE — 96372 THER/PROPH/DIAG INJ SC/IM: CPT | Performed by: NURSE PRACTITIONER

## 2023-10-16 PROCEDURE — 3075F SYST BP GE 130 - 139MM HG: CPT | Performed by: NURSE PRACTITIONER

## 2023-10-16 PROCEDURE — 99213 OFFICE O/P EST LOW 20 MIN: CPT | Performed by: NURSE PRACTITIONER

## 2023-10-16 PROCEDURE — 3078F DIAST BP <80 MM HG: CPT | Performed by: NURSE PRACTITIONER

## 2023-10-16 RX ORDER — METHYLPREDNISOLONE ACETATE 80 MG/ML
80 INJECTION, SUSPENSION INTRA-ARTICULAR; INTRALESIONAL; INTRAMUSCULAR; SOFT TISSUE ONCE
Status: COMPLETED | OUTPATIENT
Start: 2023-10-16 | End: 2023-10-16

## 2023-10-16 RX ADMIN — METHYLPREDNISOLONE ACETATE 80 MG: 80 INJECTION, SUSPENSION INTRA-ARTICULAR; INTRALESIONAL; INTRAMUSCULAR; SOFT TISSUE at 16:18

## 2023-10-16 NOTE — ASSESSMENT & PLAN NOTE
Mix hydrocortisone and Benadryl together and use 4 times a day. You can use the Benadryl cream as often as you need in between times.     Medrol 80 IM today

## 2023-10-16 NOTE — PATIENT INSTRUCTIONS
Poison ivy rash;    Medrol 80 IM today   Mix hydrocortisone and Benadryl together and use 4 times a day. You can use the Benadryl cream as often as you need in between times.

## 2023-10-16 NOTE — PROGRESS NOTES
Component Value Date    VITD25 33.4 07/17/2023       Subjective:      Review of Systems   Skin:  Positive for rash. Objective:     Physical Exam  Skin:     Findings: Rash present. /68   Pulse 68   SpO2 98%           Assessment:      Problem List       Poison ivy - Primary     Mix hydrocortisone and Benadryl together and use 4 times a day. You can use the Benadryl cream as often as you need in between times. Medrol 80 IM today                Plan:        Patient given educational materials - see patient instructions. Discussed use, benefit, and side effects of prescribed medications. Allpatient questions answered. Pt voiced understanding. Reviewed health maintenance. Instructed to continue current medications, diet and exercise. Patient agreed with treatment plan. Follow up as directed. MEDICATIONS:  Orders Placed This Encounter   Medications    methylPREDNISolone acetate (DEPO-MEDROL) injection 80 mg         ORDERS:  No orders of the defined types were placed in this encounter. Follow-up:  No follow-ups on file. PATIENT INSTRUCTIONS:  Patient Instructions    Poison ivy rash;    Medrol 80 IM today   Mix hydrocortisone and Benadryl together and use 4 times a day. You can use the Benadryl cream as often as you need in between times. @[employer] [facility]  1) Manage blood pressure - high blood pressure is a major risk factor for heart disease and stroke. Keeping blood pressure in health range reduces strain on your heart, arteries and kidneys. Blood pressure goal is less than 130/80. 2) Control cholesterol - contributes to plaque, which can clog arteries and lead to heart disease and stroke. When you control your cholesterol you are giving your arteries their best chance to remain clear. It is recommended that you get cholesterol lab work done once a year.   3) Reduce blood sugar - most of the food we eat is turning into glucose or blood sugar that our body uses for

## 2023-10-23 RX ORDER — ALBUTEROL SULFATE 90 UG/1
AEROSOL, METERED RESPIRATORY (INHALATION)
Qty: 8.5 G | Refills: 1 | Status: SHIPPED | OUTPATIENT
Start: 2023-10-23

## 2023-10-23 NOTE — TELEPHONE ENCOUNTER
Enriqueta Isbell called to request a refill on her medication.       Last office visit : 10/16/2023   Next office visit : 1/17/2024     Requested Prescriptions     Pending Prescriptions Disp Refills    albuterol sulfate HFA (PROVENTIL;VENTOLIN;PROAIR) 108 (90 Base) MCG/ACT inhaler [Pharmacy Med Name: ALBUTEROL HFA INH (200 PUFFS) 8.5GM] 8.5 g 1     Sig: INHALE 2 PUFFS INTO THE LUNGS FOUR TIMES DAILY AS NEEDED FOR WHEEZING            Romaine Moser MA

## 2023-10-24 ENCOUNTER — OFFICE VISIT (OUTPATIENT)
Age: 63
End: 2023-10-24
Payer: COMMERCIAL

## 2023-10-24 VITALS
BODY MASS INDEX: 26.03 KG/M2 | WEIGHT: 156.4 LBS | TEMPERATURE: 97.7 F | OXYGEN SATURATION: 97 % | SYSTOLIC BLOOD PRESSURE: 126 MMHG | RESPIRATION RATE: 20 BRPM | DIASTOLIC BLOOD PRESSURE: 80 MMHG | HEART RATE: 83 BPM

## 2023-10-24 DIAGNOSIS — H66.93 BILATERAL OTITIS MEDIA, UNSPECIFIED OTITIS MEDIA TYPE: ICD-10-CM

## 2023-10-24 DIAGNOSIS — R05.1 ACUTE COUGH: ICD-10-CM

## 2023-10-24 DIAGNOSIS — J06.9 ACUTE UPPER RESPIRATORY INFECTION: Primary | ICD-10-CM

## 2023-10-24 DIAGNOSIS — R09.81 SINUS CONGESTION: ICD-10-CM

## 2023-10-24 LAB
INFLUENZA A ANTIBODY: NORMAL
INFLUENZA B ANTIBODY: NORMAL
S PYO AG THROAT QL: NORMAL
SARS-COV-2 N GENE RESP QL NAA+PROBE: DETECTED

## 2023-10-24 PROCEDURE — 99213 OFFICE O/P EST LOW 20 MIN: CPT

## 2023-10-24 PROCEDURE — 3079F DIAST BP 80-89 MM HG: CPT

## 2023-10-24 PROCEDURE — 3074F SYST BP LT 130 MM HG: CPT

## 2023-10-24 RX ORDER — CEFDINIR 300 MG/1
300 CAPSULE ORAL 2 TIMES DAILY
Qty: 14 CAPSULE | Refills: 0 | Status: SHIPPED | OUTPATIENT
Start: 2023-10-24 | End: 2023-10-31

## 2023-10-24 ASSESSMENT — ENCOUNTER SYMPTOMS
APNEA: 0
SORE THROAT: 0
SINUS PAIN: 0
CHEST TIGHTNESS: 0
CHOKING: 0
FACIAL SWELLING: 0
VOMITING: 0
EYE REDNESS: 0
COLOR CHANGE: 0
CONSTIPATION: 0
NAUSEA: 0
SHORTNESS OF BREATH: 0
ABDOMINAL PAIN: 0
TROUBLE SWALLOWING: 0
EYE PAIN: 0
SINUS PRESSURE: 0
EYE DISCHARGE: 0
ABDOMINAL DISTENTION: 0
DIARRHEA: 1
WHEEZING: 0
STRIDOR: 0
COUGH: 1

## 2023-10-24 NOTE — PATIENT INSTRUCTIONS
Strep and flu tests are negative    Covid test sent to lab; will call with results    Cefdinir prescribed;  Take full course of antibiotics    Increase fluid intake    Recommended OTC mucinex     May use OTC claritin/zyrtec and nasal spray such as flonase to help symptoms    If patient is not improving or developing any new/worsening symptoms then return to clinic or f/u with PCP    Note for today

## 2023-10-24 NOTE — PROGRESS NOTES
Date of Symptom Onset     Answer:   N/A     Order Specific Question:   Hospitalized for COVID-19? Answer:   No     Order Specific Question:   Admitted to ICU for COVID-19? Answer:   No     Order Specific Question:   Employed in healthcare setting? Answer:   Unknown     Order Specific Question:   Resident in a congregate (group) care setting? Answer:   Unknown     Order Specific Question:   Pregnant? Answer:   No     Order Specific Question:   Previously tested for COVID-19? Answer:   Yes    COVID-19    COVID-19    POCT rapid strep A    POCT Influenza A/B       Results for orders placed or performed in visit on 10/24/23   POCT rapid strep A   Result Value Ref Range    Strep A Ag None Detected None Detected   POCT Influenza A/B   Result Value Ref Range    Influenza A Ab neg     Influenza B Ab neg        Orders Placed This Encounter   Medications    cefdinir (OMNICEF) 300 MG capsule     Sig: Take 1 capsule by mouth 2 times daily for 7 days     Dispense:  14 capsule     Refill:  0      New Prescriptions    CEFDINIR (OMNICEF) 300 MG CAPSULE    Take 1 capsule by mouth 2 times daily for 7 days        Return if symptoms worsen or fail to improve. Discussed use, benefits, and side effects of any prescribed medications. All patient questions were answered. Patient voiced understanding of care plan. Patient was given educational materials - see patient instructions below. Patient Instructions   Strep and flu tests are negative    Covid test sent to lab; will call with results    Cefdinir prescribed;  Take full course of antibiotics    Increase fluid intake    Recommended OTC mucinex     May use OTC claritin/zyrtec and nasal spray such as flonase to help symptoms    If patient is not improving or developing any new/worsening symptoms then return to clinic or f/u with PCP    Note for today      Electronically signed by RENNY Cardoso CNP on 10/24/2023 at 8:19 AM

## 2023-11-20 DIAGNOSIS — J82.83 EOSINOPHILIC ASTHMA: Primary | ICD-10-CM

## 2023-11-20 RX ORDER — DUPILUMAB 300 MG/2ML
INJECTION, SOLUTION SUBCUTANEOUS
Qty: 4 ML | Refills: 11 | Status: SHIPPED | OUTPATIENT
Start: 2023-11-20

## 2023-12-12 RX ORDER — ALBUTEROL SULFATE 90 UG/1
AEROSOL, METERED RESPIRATORY (INHALATION)
Qty: 8.5 G | Refills: 1 | Status: SHIPPED | OUTPATIENT
Start: 2023-12-12

## 2023-12-12 NOTE — TELEPHONE ENCOUNTER
Ricardo Severe called to request a refill on her medication.       Last office visit : 10/16/2023   Next office visit : 1/17/2024     Requested Prescriptions     Pending Prescriptions Disp Refills    albuterol sulfate HFA (PROVENTIL;VENTOLIN;PROAIR) 108 (90 Base) MCG/ACT inhaler [Pharmacy Med Name: ALBUTEROL HFA INH (200 PUFFS) 8.5GM] 8.5 g 1     Sig: INHALE 2 PUFFS INTO THE LUNGS FOUR TIMES DAILY AS NEEDED FOR WHEEZING            Nabor Smith MA

## 2024-01-17 ENCOUNTER — OFFICE VISIT (OUTPATIENT)
Dept: INTERNAL MEDICINE | Age: 64
End: 2024-01-17
Payer: COMMERCIAL

## 2024-01-17 VITALS
OXYGEN SATURATION: 97 % | HEIGHT: 65 IN | WEIGHT: 171.4 LBS | HEART RATE: 82 BPM | DIASTOLIC BLOOD PRESSURE: 88 MMHG | SYSTOLIC BLOOD PRESSURE: 130 MMHG | BODY MASS INDEX: 28.56 KG/M2

## 2024-01-17 DIAGNOSIS — M25.562 CHRONIC PAIN OF BOTH KNEES: ICD-10-CM

## 2024-01-17 DIAGNOSIS — M25.561 CHRONIC PAIN OF BOTH KNEES: ICD-10-CM

## 2024-01-17 DIAGNOSIS — Z00.00 ANNUAL PHYSICAL EXAM: Primary | ICD-10-CM

## 2024-01-17 DIAGNOSIS — M81.6 LOCALIZED OSTEOPOROSIS WITHOUT CURRENT PATHOLOGICAL FRACTURE: ICD-10-CM

## 2024-01-17 DIAGNOSIS — E55.9 VITAMIN D DEFICIENCY: ICD-10-CM

## 2024-01-17 DIAGNOSIS — I10 ESSENTIAL HYPERTENSION: ICD-10-CM

## 2024-01-17 DIAGNOSIS — Z23 NEED FOR VACCINATION: ICD-10-CM

## 2024-01-17 DIAGNOSIS — F51.01 PRIMARY INSOMNIA: ICD-10-CM

## 2024-01-17 DIAGNOSIS — Z12.31 ENCOUNTER FOR SCREENING MAMMOGRAM FOR MALIGNANT NEOPLASM OF BREAST: ICD-10-CM

## 2024-01-17 DIAGNOSIS — G89.29 CHRONIC PAIN OF BOTH KNEES: ICD-10-CM

## 2024-01-17 DIAGNOSIS — E78.00 PURE HYPERCHOLESTEROLEMIA: ICD-10-CM

## 2024-01-17 DIAGNOSIS — J01.90 ACUTE SINUSITIS, RECURRENCE NOT SPECIFIED, UNSPECIFIED LOCATION: ICD-10-CM

## 2024-01-17 DIAGNOSIS — R79.89 ABNORMAL CBC: ICD-10-CM

## 2024-01-17 LAB
25(OH)D3 SERPL-MCNC: 36.9 NG/ML
ALBUMIN SERPL-MCNC: 4.2 G/DL (ref 3.5–5.2)
ALP SERPL-CCNC: 127 U/L (ref 35–104)
ALT SERPL-CCNC: 31 U/L (ref 5–33)
ANION GAP SERPL CALCULATED.3IONS-SCNC: 10 MMOL/L (ref 7–19)
AST SERPL-CCNC: 41 U/L (ref 5–32)
BASOPHILS # BLD: 0.1 K/UL (ref 0–0.2)
BASOPHILS NFR BLD: 1 % (ref 0–1)
BILIRUB SERPL-MCNC: 0.7 MG/DL (ref 0.2–1.2)
BILIRUB UR QL STRIP: NEGATIVE
BUN SERPL-MCNC: 17 MG/DL (ref 8–23)
CALCIUM SERPL-MCNC: 9.2 MG/DL (ref 8.8–10.2)
CHLORIDE SERPL-SCNC: 106 MMOL/L (ref 98–111)
CHOLEST SERPL-MCNC: 184 MG/DL (ref 160–199)
CLARITY UR: CLEAR
CO2 SERPL-SCNC: 25 MMOL/L (ref 22–29)
COLOR UR: YELLOW
CREAT SERPL-MCNC: 0.7 MG/DL (ref 0.5–0.9)
EOSINOPHIL # BLD: 2.1 K/UL (ref 0–0.6)
EOSINOPHIL NFR BLD: 25 % (ref 0–5)
ERYTHROCYTE [DISTWIDTH] IN BLOOD BY AUTOMATED COUNT: 14.5 % (ref 11.5–14.5)
GLUCOSE SERPL-MCNC: 83 MG/DL (ref 74–109)
GLUCOSE UR STRIP.AUTO-MCNC: NEGATIVE MG/DL
HBA1C MFR BLD: 4.6 % (ref 4–6)
HCT VFR BLD AUTO: 43.4 % (ref 37–47)
HDLC SERPL-MCNC: 72 MG/DL (ref 65–121)
HGB BLD-MCNC: 13.9 G/DL (ref 12–16)
HGB UR STRIP.AUTO-MCNC: NEGATIVE MG/L
HYPOCHROMIA BLD QL SMEAR: ABNORMAL
IMM GRANULOCYTES # BLD: 0 K/UL
KETONES UR STRIP.AUTO-MCNC: NEGATIVE MG/DL
LDLC SERPL CALC-MCNC: 93 MG/DL
LEUKOCYTE ESTERASE UR QL STRIP.AUTO: NEGATIVE
LYMPHOCYTES # BLD: 2 K/UL (ref 1.1–4.5)
LYMPHOCYTES NFR BLD: 11 % (ref 20–40)
MCH RBC QN AUTO: 28.7 PG (ref 27–31)
MCHC RBC AUTO-ENTMCNC: 32 G/DL (ref 33–37)
MCV RBC AUTO: 89.5 FL (ref 81–99)
MONOCYTES # BLD: 0.4 K/UL (ref 0–0.9)
MONOCYTES NFR BLD: 5 % (ref 0–10)
NEUTROPHILS # BLD: 3.8 K/UL (ref 1.5–7.5)
NEUTS SEG NFR BLD: 45 % (ref 50–65)
NITRITE UR QL STRIP.AUTO: NEGATIVE
OVALOCYTES BLD QL SMEAR: ABNORMAL
PH UR STRIP.AUTO: 6.5 [PH] (ref 5–8)
PLATELET # BLD AUTO: 253 K/UL (ref 130–400)
PLATELET SLIDE REVIEW: ADEQUATE
PMV BLD AUTO: 9.7 FL (ref 9.4–12.3)
POTASSIUM SERPL-SCNC: 4.7 MMOL/L (ref 3.5–5)
PROT SERPL-MCNC: 7.2 G/DL (ref 6.6–8.7)
PROT UR STRIP.AUTO-MCNC: NEGATIVE MG/DL
RBC # BLD AUTO: 4.85 M/UL (ref 4.2–5.4)
SODIUM SERPL-SCNC: 141 MMOL/L (ref 136–145)
SP GR UR STRIP.AUTO: 1.02 (ref 1–1.03)
TRIGL SERPL-MCNC: 94 MG/DL (ref 0–149)
TSH SERPL DL<=0.005 MIU/L-ACNC: 1.43 UIU/ML (ref 0.27–4.2)
UROBILINOGEN UR STRIP.AUTO-MCNC: 1 E.U./DL
VARIANT LYMPHS NFR BLD: 13 % (ref 0–8)
WBC # BLD AUTO: 8.4 K/UL (ref 4.8–10.8)

## 2024-01-17 PROCEDURE — 3079F DIAST BP 80-89 MM HG: CPT | Performed by: INTERNAL MEDICINE

## 2024-01-17 PROCEDURE — 3075F SYST BP GE 130 - 139MM HG: CPT | Performed by: INTERNAL MEDICINE

## 2024-01-17 PROCEDURE — 96372 THER/PROPH/DIAG INJ SC/IM: CPT | Performed by: INTERNAL MEDICINE

## 2024-01-17 PROCEDURE — 99396 PREV VISIT EST AGE 40-64: CPT | Performed by: INTERNAL MEDICINE

## 2024-01-17 RX ORDER — HYDROCODONE BITARTRATE AND ACETAMINOPHEN 7.5; 325 MG/1; MG/1
1 TABLET ORAL DAILY PRN
Qty: 30 TABLET | Refills: 0 | Status: SHIPPED | OUTPATIENT
Start: 2024-01-17 | End: 2024-02-16

## 2024-01-17 RX ORDER — METHYLPREDNISOLONE ACETATE 80 MG/ML
80 INJECTION, SUSPENSION INTRA-ARTICULAR; INTRALESIONAL; INTRAMUSCULAR; SOFT TISSUE ONCE
Status: COMPLETED | OUTPATIENT
Start: 2024-01-17 | End: 2024-01-17

## 2024-01-17 RX ORDER — ZOLPIDEM TARTRATE 10 MG/1
10 TABLET ORAL NIGHTLY PRN
Qty: 30 TABLET | Refills: 0 | Status: SHIPPED | OUTPATIENT
Start: 2024-01-17 | End: 2024-02-16

## 2024-01-17 RX ORDER — CEFDINIR 300 MG/1
300 CAPSULE ORAL 2 TIMES DAILY
Qty: 14 CAPSULE | Refills: 0 | Status: SHIPPED | OUTPATIENT
Start: 2024-01-17 | End: 2024-01-24

## 2024-01-17 RX ADMIN — METHYLPREDNISOLONE ACETATE 80 MG: 80 INJECTION, SUSPENSION INTRA-ARTICULAR; INTRALESIONAL; INTRAMUSCULAR; SOFT TISSUE at 12:25

## 2024-01-17 ASSESSMENT — PATIENT HEALTH QUESTIONNAIRE - PHQ9
6. FEELING BAD ABOUT YOURSELF - OR THAT YOU ARE A FAILURE OR HAVE LET YOURSELF OR YOUR FAMILY DOWN: 0
3. TROUBLE FALLING OR STAYING ASLEEP: 3
7. TROUBLE CONCENTRATING ON THINGS, SUCH AS READING THE NEWSPAPER OR WATCHING TELEVISION: 1
5. POOR APPETITE OR OVEREATING: 0
10. IF YOU CHECKED OFF ANY PROBLEMS, HOW DIFFICULT HAVE THESE PROBLEMS MADE IT FOR YOU TO DO YOUR WORK, TAKE CARE OF THINGS AT HOME, OR GET ALONG WITH OTHER PEOPLE: 1
2. FEELING DOWN, DEPRESSED OR HOPELESS: 0
SUM OF ALL RESPONSES TO PHQ QUESTIONS 1-9: 5
SUM OF ALL RESPONSES TO PHQ QUESTIONS 1-9: 5
8. MOVING OR SPEAKING SO SLOWLY THAT OTHER PEOPLE COULD HAVE NOTICED. OR THE OPPOSITE, BEING SO FIGETY OR RESTLESS THAT YOU HAVE BEEN MOVING AROUND A LOT MORE THAN USUAL: 0
SUM OF ALL RESPONSES TO PHQ QUESTIONS 1-9: 5
SUM OF ALL RESPONSES TO PHQ QUESTIONS 1-9: 5
4. FEELING TIRED OR HAVING LITTLE ENERGY: 1
9. THOUGHTS THAT YOU WOULD BE BETTER OFF DEAD, OR OF HURTING YOURSELF: 0

## 2024-01-17 ASSESSMENT — ENCOUNTER SYMPTOMS
ABDOMINAL PAIN: 0
CHEST TIGHTNESS: 0
WHEEZING: 0
CONSTIPATION: 0
SORE THROAT: 1
COUGH: 1
SINUS PRESSURE: 1

## 2024-01-17 NOTE — PROGRESS NOTES
Chief Complaint:   Krystin Johnson is a 63 y.o. female who presents forcomplete physical exam.    History of Present Illness:      Krystin Johnson is a 63 y.o. female who presents todayfor wellness visit AND follow up on her chronic medical conditions as noted below.      Patient Active Problem List    Diagnosis Date Noted    Chronic sinusitis with recurrent bronchitis 02/21/2023     Priority: Medium    Eosinophilic bronchitis 02/21/2023     Priority: Medium    Esophageal dysphagia 01/25/2023     Priority: Medium    Hilar adenopathy 01/25/2023     Priority: Medium    Moderate COPD (chronic obstructive pulmonary disease) ([facility]) 11/30/2022     Priority: Medium    Eosinophilic asthma 11/30/2022     Priority: Medium    Elevated IgE level 11/30/2022     Priority: Medium    Gastroesophageal reflux disease without esophagitis 10/03/2022     Priority: Medium    Hiatal hernia 10/03/2022     Priority: Medium    Long COVID 10/03/2022     Priority: Medium    Shortness of breath 08/12/2022     Priority: Medium    Reactive airway disease 06/29/2022     Priority: Medium    Chronic cough 06/29/2022     Priority: Medium    Poison ivy 10/16/2023    Chronic pain of both knees 08/01/2018    Facial skin lesion 08/01/2018    Right temporal headache 09/21/2017    Vision changes 09/21/2017    Pure hypercholesterolemia 09/10/2017    Vitamin D deficiency 09/10/2017    Essential hypertension 09/10/2017    Localized osteoporosis without current pathological fracture 09/10/2017     2/14 Lspine -2.1/ hi neck -2.6; 1/2017 Lspine -1.8; hip neck -2.6  3/2021 hip neck -2.8; L spine -1.8      Rheumatoid arthritis involving both hands with positive rheumatoid factor ([facility]) 09/10/2017     Dx 2011 RA/ CCP ne      Primary insomnia 09/10/2017    Generalized anxiety disorder 09/10/2017    Reactive depression 09/10/2017       Past Medical History:   Diagnosis Date    Chronic sinusitis     Cough     chronic during covid; \"long covid\", \"under control\"

## 2024-01-22 DIAGNOSIS — Z12.31 ENCOUNTER FOR SCREENING MAMMOGRAM FOR MALIGNANT NEOPLASM OF BREAST: ICD-10-CM

## 2024-01-29 RX ORDER — METOPROLOL SUCCINATE 50 MG/1
TABLET, EXTENDED RELEASE ORAL
Qty: 90 TABLET | Refills: 1 | Status: SHIPPED | OUTPATIENT
Start: 2024-01-29

## 2024-01-29 NOTE — TELEPHONE ENCOUNTER
Krystin Johnson called to request a refill on her medication.      Last office visit : 1/17/2024   Next office visit : 7/17/2024     Requested Prescriptions     Pending Prescriptions Disp Refills    metoprolol succinate (TOPROL XL) 50 MG extended release tablet [Pharmacy Med Name: METOPROLOL ER SUCCINATE 50MG TABS] 90 tablet 1     Sig: TAKE 1 TABLET BY MOUTH DAILY            Mary Izquierdo MA

## 2024-01-30 ENCOUNTER — APPOINTMENT (OUTPATIENT)
Dept: CT IMAGING | Age: 64
End: 2024-01-30
Payer: COMMERCIAL

## 2024-01-30 ENCOUNTER — APPOINTMENT (OUTPATIENT)
Dept: GENERAL RADIOLOGY | Age: 64
End: 2024-01-30
Payer: COMMERCIAL

## 2024-01-30 ENCOUNTER — HOSPITAL ENCOUNTER (EMERGENCY)
Age: 64
Discharge: HOME | End: 2024-01-30
Attending: EMERGENCY MEDICINE
Payer: COMMERCIAL

## 2024-01-30 VITALS
RESPIRATION RATE: 18 BRPM | HEART RATE: 65 BPM | SYSTOLIC BLOOD PRESSURE: 150 MMHG | BODY MASS INDEX: 28.29 KG/M2 | WEIGHT: 170 LBS | DIASTOLIC BLOOD PRESSURE: 80 MMHG | TEMPERATURE: 97.2 F | OXYGEN SATURATION: 97 %

## 2024-01-30 DIAGNOSIS — R10.12 ABDOMINAL PAIN, LEFT UPPER QUADRANT: ICD-10-CM

## 2024-01-30 DIAGNOSIS — R07.81 PLEURITIC PAIN: ICD-10-CM

## 2024-01-30 DIAGNOSIS — N28.1 RENAL CYST: ICD-10-CM

## 2024-01-30 DIAGNOSIS — R91.1 RIGHT LOWER LOBE PULMONARY NODULE: ICD-10-CM

## 2024-01-30 DIAGNOSIS — R07.89 LEFT-SIDED CHEST WALL PAIN: Primary | ICD-10-CM

## 2024-01-30 LAB
ALBUMIN SERPL-MCNC: 4.3 G/DL (ref 3.5–5.2)
ALP SERPL-CCNC: 115 U/L (ref 35–104)
ALT SERPL-CCNC: 22 U/L (ref 5–33)
ANION GAP SERPL CALCULATED.3IONS-SCNC: 9 MMOL/L (ref 7–19)
AST SERPL-CCNC: 22 U/L (ref 5–32)
BASOPHILS # BLD: 0.1 K/UL (ref 0–0.2)
BASOPHILS NFR BLD: 1.1 % (ref 0–1)
BILIRUB SERPL-MCNC: 0.7 MG/DL (ref 0.2–1.2)
BILIRUB UR QL STRIP: NEGATIVE
BUN SERPL-MCNC: 22 MG/DL (ref 8–23)
CALCIUM SERPL-MCNC: 9.3 MG/DL (ref 8.8–10.2)
CHLORIDE SERPL-SCNC: 106 MMOL/L (ref 98–111)
CLARITY UR: NORMAL
CO2 SERPL-SCNC: 27 MMOL/L (ref 22–29)
COLOR UR: YELLOW
CREAT SERPL-MCNC: 0.7 MG/DL (ref 0.5–0.9)
D DIMER PPP FEU-MCNC: 0.31 UG/ML FEU (ref 0–0.48)
EOSINOPHIL # BLD: 0.8 K/UL (ref 0–0.6)
EOSINOPHIL NFR BLD: 9.4 % (ref 0–5)
ERYTHROCYTE [DISTWIDTH] IN BLOOD BY AUTOMATED COUNT: 14.4 % (ref 11.5–14.5)
GLUCOSE SERPL-MCNC: 93 MG/DL (ref 74–109)
GLUCOSE UR STRIP.AUTO-MCNC: NEGATIVE MG/DL
HCT VFR BLD AUTO: 44.2 % (ref 37–47)
HGB BLD-MCNC: 14.2 G/DL (ref 12–16)
HGB UR STRIP.AUTO-MCNC: NEGATIVE MG/L
IMM GRANULOCYTES # BLD: 0 K/UL
KETONES UR STRIP.AUTO-MCNC: NEGATIVE MG/DL
LEUKOCYTE ESTERASE UR QL STRIP.AUTO: NEGATIVE
LIPASE SERPL-CCNC: 61 U/L (ref 13–60)
LYMPHOCYTES # BLD: 2.1 K/UL (ref 1.1–4.5)
LYMPHOCYTES NFR BLD: 24.7 % (ref 20–40)
MCH RBC QN AUTO: 28 PG (ref 27–31)
MCHC RBC AUTO-ENTMCNC: 32.1 G/DL (ref 33–37)
MCV RBC AUTO: 87 FL (ref 81–99)
MONOCYTES # BLD: 0.6 K/UL (ref 0–0.9)
MONOCYTES NFR BLD: 7.5 % (ref 0–10)
NEUTROPHILS # BLD: 4.8 K/UL (ref 1.5–7.5)
NEUTS SEG NFR BLD: 57.1 % (ref 50–65)
NITRITE UR QL STRIP.AUTO: NEGATIVE
PH UR STRIP.AUTO: 5.5 [PH] (ref 5–8)
PLATELET # BLD AUTO: 244 K/UL (ref 130–400)
PMV BLD AUTO: 9.2 FL (ref 9.4–12.3)
POTASSIUM SERPL-SCNC: 3.9 MMOL/L (ref 3.5–5)
PROT SERPL-MCNC: 7.1 G/DL (ref 6.6–8.7)
PROT UR STRIP.AUTO-MCNC: NEGATIVE MG/DL
RBC # BLD AUTO: 5.08 M/UL (ref 4.2–5.4)
SODIUM SERPL-SCNC: 142 MMOL/L (ref 136–145)
SP GR UR STRIP.AUTO: 1.01 (ref 1–1.03)
TROPONIN, HIGH SENSITIVITY: 9 NG/L (ref 0–14)
UROBILINOGEN UR STRIP.AUTO-MCNC: 0.2 E.U./DL
WBC # BLD AUTO: 8.4 K/UL (ref 4.8–10.8)

## 2024-01-30 PROCEDURE — 84484 ASSAY OF TROPONIN QUANT: CPT

## 2024-01-30 PROCEDURE — 96374 THER/PROPH/DIAG INJ IV PUSH: CPT

## 2024-01-30 PROCEDURE — 74176 CT ABD & PELVIS W/O CONTRAST: CPT

## 2024-01-30 PROCEDURE — 81003 URINALYSIS AUTO W/O SCOPE: CPT

## 2024-01-30 PROCEDURE — 99285 EMERGENCY DEPT VISIT HI MDM: CPT

## 2024-01-30 PROCEDURE — 85379 FIBRIN DEGRADATION QUANT: CPT

## 2024-01-30 PROCEDURE — 85025 COMPLETE CBC W/AUTO DIFF WBC: CPT

## 2024-01-30 PROCEDURE — 6370000000 HC RX 637 (ALT 250 FOR IP): Performed by: EMERGENCY MEDICINE

## 2024-01-30 PROCEDURE — 71045 X-RAY EXAM CHEST 1 VIEW: CPT

## 2024-01-30 PROCEDURE — 6360000002 HC RX W HCPCS: Performed by: EMERGENCY MEDICINE

## 2024-01-30 PROCEDURE — 36415 COLL VENOUS BLD VENIPUNCTURE: CPT

## 2024-01-30 PROCEDURE — 83690 ASSAY OF LIPASE: CPT

## 2024-01-30 PROCEDURE — 93005 ELECTROCARDIOGRAM TRACING: CPT | Performed by: EMERGENCY MEDICINE

## 2024-01-30 PROCEDURE — 80053 COMPREHEN METABOLIC PANEL: CPT

## 2024-01-30 RX ORDER — HYDROCODONE BITARTRATE AND ACETAMINOPHEN 7.5; 325 MG/1; MG/1
1 TABLET ORAL ONCE
Status: COMPLETED | OUTPATIENT
Start: 2024-01-30 | End: 2024-01-30

## 2024-01-30 RX ORDER — KETOROLAC TROMETHAMINE 30 MG/ML
30 INJECTION, SOLUTION INTRAMUSCULAR; INTRAVENOUS ONCE
Status: COMPLETED | OUTPATIENT
Start: 2024-01-30 | End: 2024-01-30

## 2024-01-30 RX ADMIN — KETOROLAC TROMETHAMINE 30 MG: 30 INJECTION INTRAMUSCULAR; INTRAVENOUS at 17:09

## 2024-01-30 RX ADMIN — HYDROCODONE BITARTRATE AND ACETAMINOPHEN 1 TABLET: 7.5; 325 TABLET ORAL at 14:52

## 2024-01-30 ASSESSMENT — PAIN SCALES - GENERAL
PAINLEVEL_OUTOF10: 5
PAINLEVEL_OUTOF10: 10

## 2024-01-30 NOTE — ED PROVIDER NOTES
[facility] EMERGENCY DEPT  eMERGENCY dEPARTMENT eNCOUnter      Pt Name: Krystin Johnson  MRN: [id number]  Birthdate 1960  Date of evaluation: 1/30/2024  Provider: Flora Anaya DO    CHIEF COMPLAINT       Chief Complaint   Patient presents with    Flank Pain     left         HISTORY OF PRESENT ILLNESS   (Location/Symptom, Timing/Onset,Context/Setting, Quality, Duration, Modifying Factors, Severity)  Note limiting factors.   Krystin Johnson is a 63 y.o. female who presents to the emergency department      Patient is a 63-year-old female who presents the emergency department complaining of \"left side pain\". She says the pain seems to be in the lower left rib and left upper abdomen. She says it began spontaneously 2 days go and has worsened. She reports pain worsens with movement of the torso and with deep breathing. She feels a little short of breath due to the pain. She denies cough, fever, chills, hemoptysis, leg pain or swelling, nausea, vomiting, diarrhea, constipation, dysuria, urinary frequency, urgency, dizziness, passing out.  She denies any pain with eating.  She denies any prior similar symptoms.  No history of blood clots.  She denies any history of coronary artery disease.  She says she takes medication for hyperlipidemia.  She denies hypertension, diabetes, family history of premature heart disease.  She says she is allergic to morphine but can take hydrocodone. She denies any other complaints or concerns at this time              NursingNotes were reviewed.    REVIEW OF SYSTEMS    (2-9 systems for level 4, 10 or more for level 5)     As per HPI         PAST MEDICALHISTORY     Past Medical History:   Diagnosis Date    Chronic sinusitis     Cough     chronic during covid; \"long covid\", \"under control\"    COVID-19 03/2022    chest congestion; mild sob    Depression     Eosinophilic asthma 11/30/2022    Generalized anxiety disorder     GERD (gastroesophageal reflux disease)     Headache     Hyperlipidemia

## 2024-01-31 LAB
EKG P AXIS: 12 DEGREES
EKG P-R INTERVAL: 222 MS
EKG Q-T INTERVAL: 424 MS
EKG QRS DURATION: 86 MS
EKG QTC CALCULATION (BAZETT): 431 MS
EKG T AXIS: 4 DEGREES

## 2024-01-31 PROCEDURE — 93010 ELECTROCARDIOGRAM REPORT: CPT | Performed by: INTERNAL MEDICINE

## 2024-02-12 RX ORDER — ALBUTEROL SULFATE 90 UG/1
AEROSOL, METERED RESPIRATORY (INHALATION)
Qty: 8.5 G | Refills: 1 | Status: SHIPPED | OUTPATIENT
Start: 2024-02-12

## 2024-02-12 NOTE — TELEPHONE ENCOUNTER
Krystin Johnson called to request a refill on her medication.      Last office visit : 1/17/2024   Next office visit : 7/17/2024     Requested Prescriptions     Pending Prescriptions Disp Refills    albuterol sulfate HFA (PROVENTIL;VENTOLIN;PROAIR) 108 (90 Base) MCG/ACT inhaler [Pharmacy Med Name: ALBUTEROL HFA INH (200 PUFFS) 8.5GM] 8.5 g 1     Sig: INHALE 2 PUFFS INTO THE LUNGS FOUR TIMES DAILY AS NEEDED FOR WHEEZING            Mary Izquierdo MA

## 2024-02-14 RX ORDER — FLUOXETINE HYDROCHLORIDE 40 MG/1
CAPSULE ORAL
Qty: 90 CAPSULE | Refills: 1 | Status: SHIPPED | OUTPATIENT
Start: 2024-02-14

## 2024-02-14 RX ORDER — ROSUVASTATIN CALCIUM 10 MG/1
TABLET, COATED ORAL
Qty: 90 TABLET | Refills: 1 | Status: SHIPPED | OUTPATIENT
Start: 2024-02-14

## 2024-02-14 NOTE — TELEPHONE ENCOUNTER
Krystin Johnson called to request a refill on her medication.      Last office visit : 1/17/2024   Next office visit : 7/17/2024     Requested Prescriptions     Pending Prescriptions Disp Refills    rosuvastatin (CRESTOR) 10 MG tablet [Pharmacy Med Name: ROSUVASTATIN 10MG TABLETS] 90 tablet 1     Sig: TAKE 1 TABLET BY MOUTH EVERY NIGHT AT BEDTIME    FLUoxetine (PROZAC) 40 MG capsule [Pharmacy Med Name: FLUOXETINE 40MG CAPSULES] 90 capsule 1     Sig: TAKE 1 CAPSULE BY MOUTH DAILY            Mary Izquierdo MA

## 2024-07-17 ENCOUNTER — OFFICE VISIT (OUTPATIENT)
Dept: INTERNAL MEDICINE | Age: 64
End: 2024-07-17
Payer: COMMERCIAL

## 2024-07-17 VITALS
HEIGHT: 65 IN | HEART RATE: 66 BPM | BODY MASS INDEX: 30.06 KG/M2 | WEIGHT: 180.4 LBS | SYSTOLIC BLOOD PRESSURE: 132 MMHG | DIASTOLIC BLOOD PRESSURE: 90 MMHG | OXYGEN SATURATION: 96 %

## 2024-07-17 DIAGNOSIS — E78.00 PURE HYPERCHOLESTEROLEMIA: ICD-10-CM

## 2024-07-17 DIAGNOSIS — D72.18 CHURG-STRAUSS SYNDROME WITH LUNG INVOLVEMENT (HCC): ICD-10-CM

## 2024-07-17 DIAGNOSIS — M25.561 CHRONIC PAIN OF BOTH KNEES: ICD-10-CM

## 2024-07-17 DIAGNOSIS — J01.90 ACUTE SINUSITIS, RECURRENCE NOT SPECIFIED, UNSPECIFIED LOCATION: ICD-10-CM

## 2024-07-17 DIAGNOSIS — Z00.00 ANNUAL PHYSICAL EXAM: ICD-10-CM

## 2024-07-17 DIAGNOSIS — M25.562 CHRONIC PAIN OF BOTH KNEES: ICD-10-CM

## 2024-07-17 DIAGNOSIS — G89.29 CHRONIC PAIN OF BOTH KNEES: ICD-10-CM

## 2024-07-17 DIAGNOSIS — R79.89 ABNORMAL CBC: ICD-10-CM

## 2024-07-17 DIAGNOSIS — M81.6 LOCALIZED OSTEOPOROSIS WITHOUT CURRENT PATHOLOGICAL FRACTURE: ICD-10-CM

## 2024-07-17 DIAGNOSIS — M30.1 CHURG-STRAUSS SYNDROME WITH LUNG INVOLVEMENT (HCC): ICD-10-CM

## 2024-07-17 DIAGNOSIS — E55.9 VITAMIN D DEFICIENCY: ICD-10-CM

## 2024-07-17 DIAGNOSIS — Z23 NEED FOR VACCINATION: ICD-10-CM

## 2024-07-17 DIAGNOSIS — I10 ESSENTIAL HYPERTENSION: ICD-10-CM

## 2024-07-17 DIAGNOSIS — F51.01 PRIMARY INSOMNIA: ICD-10-CM

## 2024-07-17 DIAGNOSIS — Z12.31 ENCOUNTER FOR SCREENING MAMMOGRAM FOR MALIGNANT NEOPLASM OF BREAST: ICD-10-CM

## 2024-07-17 DIAGNOSIS — I10 ESSENTIAL HYPERTENSION: Primary | ICD-10-CM

## 2024-07-17 LAB
ALBUMIN SERPL-MCNC: 4.1 G/DL (ref 3.5–5.2)
ALP SERPL-CCNC: 161 U/L (ref 35–104)
ALT SERPL-CCNC: 55 U/L (ref 5–33)
ANION GAP SERPL CALCULATED.3IONS-SCNC: 15 MMOL/L (ref 7–19)
AST SERPL-CCNC: 54 U/L (ref 5–32)
BASOPHILS # BLD: 0.1 K/UL (ref 0–0.2)
BASOPHILS NFR BLD: 1.6 % (ref 0–1)
BILIRUB SERPL-MCNC: 1 MG/DL (ref 0.2–1.2)
BUN SERPL-MCNC: 19 MG/DL (ref 8–23)
CALCIUM SERPL-MCNC: 8.8 MG/DL (ref 8.8–10.2)
CHLORIDE SERPL-SCNC: 108 MMOL/L (ref 98–111)
CHOLEST SERPL-MCNC: 169 MG/DL (ref 160–199)
CO2 SERPL-SCNC: 21 MMOL/L (ref 22–29)
CREAT SERPL-MCNC: 0.9 MG/DL (ref 0.5–0.9)
EOSINOPHIL # BLD: 1 K/UL (ref 0–0.6)
EOSINOPHIL NFR BLD: 26.3 % (ref 0–5)
ERYTHROCYTE [DISTWIDTH] IN BLOOD BY AUTOMATED COUNT: 15.5 % (ref 11.5–14.5)
GLUCOSE SERPL-MCNC: 86 MG/DL (ref 74–109)
HBA1C MFR BLD: 4.9 % (ref 4–6)
HCT VFR BLD AUTO: 37 % (ref 37–47)
HDLC SERPL-MCNC: 67 MG/DL (ref 65–121)
HGB BLD-MCNC: 11.6 G/DL (ref 12–16)
IMM GRANULOCYTES # BLD: 0 K/UL
LDLC SERPL CALC-MCNC: 88 MG/DL
LYMPHOCYTES # BLD: 0.9 K/UL (ref 1.1–4.5)
LYMPHOCYTES NFR BLD: 22.9 % (ref 20–40)
MCH RBC QN AUTO: 28.1 PG (ref 27–31)
MCHC RBC AUTO-ENTMCNC: 31.4 G/DL (ref 33–37)
MCV RBC AUTO: 89.6 FL (ref 81–99)
MONOCYTES # BLD: 0.6 K/UL (ref 0–0.9)
MONOCYTES NFR BLD: 15.4 % (ref 0–10)
NEUTROPHILS # BLD: 1.3 K/UL (ref 1.5–7.5)
NEUTS SEG NFR BLD: 33.8 % (ref 50–65)
PLATELET # BLD AUTO: 172 K/UL (ref 130–400)
PMV BLD AUTO: 9.4 FL (ref 9.4–12.3)
POTASSIUM SERPL-SCNC: 4.2 MMOL/L (ref 3.5–5)
PROT SERPL-MCNC: 6.6 G/DL (ref 6.6–8.7)
RBC # BLD AUTO: 4.13 M/UL (ref 4.2–5.4)
SODIUM SERPL-SCNC: 144 MMOL/L (ref 136–145)
TRIGL SERPL-MCNC: 72 MG/DL (ref 0–149)
TSH SERPL DL<=0.005 MIU/L-ACNC: 1.21 UIU/ML (ref 0.27–4.2)
WBC # BLD AUTO: 3.8 K/UL (ref 4.8–10.8)

## 2024-07-17 PROCEDURE — 3074F SYST BP LT 130 MM HG: CPT | Performed by: INTERNAL MEDICINE

## 2024-07-17 PROCEDURE — 99214 OFFICE O/P EST MOD 30 MIN: CPT | Performed by: INTERNAL MEDICINE

## 2024-07-17 PROCEDURE — 3079F DIAST BP 80-89 MM HG: CPT | Performed by: INTERNAL MEDICINE

## 2024-07-17 RX ORDER — HYDROCHLOROTHIAZIDE 12.5 MG/1
12.5 CAPSULE, GELATIN COATED ORAL EVERY MORNING
Qty: 90 CAPSULE | Refills: 1 | Status: SHIPPED | OUTPATIENT
Start: 2024-07-17

## 2024-07-17 RX ORDER — ZOLPIDEM TARTRATE 10 MG/1
10 TABLET ORAL NIGHTLY PRN
Qty: 30 TABLET | Refills: 5 | Status: SHIPPED | OUTPATIENT
Start: 2024-07-17 | End: 2024-08-16

## 2024-07-17 RX ORDER — HYDROCODONE BITARTRATE AND ACETAMINOPHEN 7.5; 325 MG/1; MG/1
1 TABLET ORAL DAILY PRN
Qty: 30 TABLET | Refills: 0 | Status: SHIPPED | OUTPATIENT
Start: 2024-07-17 | End: 2024-08-16

## 2024-07-17 RX ORDER — BUSPIRONE HYDROCHLORIDE 15 MG/1
15 TABLET ORAL 2 TIMES DAILY PRN
Qty: 60 TABLET | Refills: 2 | Status: SHIPPED | OUTPATIENT
Start: 2024-07-17

## 2024-07-17 SDOH — ECONOMIC STABILITY: HOUSING INSECURITY
IN THE LAST 12 MONTHS, WAS THERE A TIME WHEN YOU DID NOT HAVE A STEADY PLACE TO SLEEP OR SLEPT IN A SHELTER (INCLUDING NOW)?: NO

## 2024-07-17 SDOH — ECONOMIC STABILITY: FOOD INSECURITY: WITHIN THE PAST 12 MONTHS, YOU WORRIED THAT YOUR FOOD WOULD RUN OUT BEFORE YOU GOT MONEY TO BUY MORE.: NEVER TRUE

## 2024-07-17 SDOH — ECONOMIC STABILITY: FOOD INSECURITY: WITHIN THE PAST 12 MONTHS, THE FOOD YOU BOUGHT JUST DIDN'T LAST AND YOU DIDN'T HAVE MONEY TO GET MORE.: NEVER TRUE

## 2024-07-17 SDOH — ECONOMIC STABILITY: INCOME INSECURITY: HOW HARD IS IT FOR YOU TO PAY FOR THE VERY BASICS LIKE FOOD, HOUSING, MEDICAL CARE, AND HEATING?: NOT HARD AT ALL

## 2024-07-17 ASSESSMENT — ENCOUNTER SYMPTOMS
ABDOMINAL PAIN: 0
SORE THROAT: 0
COUGH: 0
WHEEZING: 0
CHEST TIGHTNESS: 0
CONSTIPATION: 0

## 2024-07-17 NOTE — PROGRESS NOTES
Lab Review   No visits with results within 2 Month(s) from this visit.   Latest known visit with results is:   Admission on 01/30/2024, Discharged on 01/30/2024   Component Date Value    P-R Interval 01/30/2024 222     QRS Duration 01/30/2024 86     Q-T Interval 01/30/2024 424     QTc Calculation (Bazett) 01/30/2024 431     P Axis 01/30/2024 12     T Axis 01/30/2024 4     WBC 01/30/2024 8.4     RBC 01/30/2024 5.08     Hemoglobin 01/30/2024 14.2     Hematocrit 01/30/2024 44.2     MCV 01/30/2024 87.0     MCH 01/30/2024 28.0     MCHC 01/30/2024 32.1 (L)     RDW 01/30/2024 14.4     Platelets 01/30/2024 244     MPV 01/30/2024 9.2 (L)     Neutrophils % 01/30/2024 57.1     Lymphocytes % 01/30/2024 24.7     Monocytes % 01/30/2024 7.5     Eosinophils % 01/30/2024 9.4 (H)     Basophils % 01/30/2024 1.1 (H)     Neutrophils Absolute 01/30/2024 4.8     Immature Granulocytes # 01/30/2024 0.0     Lymphocytes Absolute 01/30/2024 2.1     Monocytes Absolute 01/30/2024 0.60     Eosinophils Absolute 01/30/2024 0.80 (H)     Basophils Absolute 01/30/2024 0.10     Sodium 01/30/2024 142     Potassium 01/30/2024 3.9     Chloride 01/30/2024 106     CO2 01/30/2024 27     Anion Gap 01/30/2024 9     Glucose 01/30/2024 93     BUN 01/30/2024 22     Creatinine 01/30/2024 0.7     Est, Glom Filt Rate 01/30/2024 >60     Calcium 01/30/2024 9.3     Total Protein 01/30/2024 7.1     Albumin 01/30/2024 4.3     Total Bilirubin 01/30/2024 0.7     Alkaline Phosphatase 01/30/2024 115 (H)     ALT 01/30/2024 22     AST 01/30/2024 22     D-Dimer, Quant 01/30/2024 0.31     Troponin, High Sensitivi* 01/30/2024 9     Lipase 01/30/2024 61 (H)     Color, UA 01/30/2024 YELLOW     Clarity, UA 01/30/2024 ERROR     Glucose, Ur 01/30/2024 Negative     Bilirubin Urine 01/30/2024 Negative     Ketones, Urine 01/30/2024 Negative     Specific Gravity, UA 01/30/2024 1.014     Blood, Urine 01/30/2024 Negative     pH, UA 01/30/2024 5.5     Protein, UA 01/30/2024 Negative

## 2024-08-02 ENCOUNTER — HOSPITAL ENCOUNTER (EMERGENCY)
Age: 64
Discharge: HOME | End: 2024-08-02
Attending: STUDENT IN AN ORGANIZED HEALTH CARE EDUCATION/TRAINING PROGRAM
Payer: COMMERCIAL

## 2024-08-02 ENCOUNTER — APPOINTMENT (OUTPATIENT)
Dept: GENERAL RADIOLOGY | Age: 64
End: 2024-08-02
Payer: COMMERCIAL

## 2024-08-02 ENCOUNTER — APPOINTMENT (OUTPATIENT)
Dept: CT IMAGING | Age: 64
End: 2024-08-02
Payer: COMMERCIAL

## 2024-08-02 VITALS
HEART RATE: 78 BPM | WEIGHT: 180 LBS | BODY MASS INDEX: 29.95 KG/M2 | SYSTOLIC BLOOD PRESSURE: 112 MMHG | TEMPERATURE: 98.6 F | OXYGEN SATURATION: 93 % | RESPIRATION RATE: 20 BRPM | DIASTOLIC BLOOD PRESSURE: 64 MMHG

## 2024-08-02 DIAGNOSIS — N30.01 ACUTE CYSTITIS WITH HEMATURIA: Primary | ICD-10-CM

## 2024-08-02 DIAGNOSIS — M54.50 ACUTE BILATERAL LOW BACK PAIN WITHOUT SCIATICA: ICD-10-CM

## 2024-08-02 DIAGNOSIS — K76.0 FATTY LIVER: ICD-10-CM

## 2024-08-02 LAB
ALBUMIN SERPL-MCNC: 3.8 G/DL (ref 3.5–5.2)
ALP SERPL-CCNC: 162 U/L (ref 35–104)
ALT SERPL-CCNC: 40 U/L (ref 5–33)
ANION GAP SERPL CALCULATED.3IONS-SCNC: 12 MMOL/L (ref 7–19)
ANISOCYTOSIS BLD QL SMEAR: ABNORMAL
AST SERPL-CCNC: 59 U/L (ref 5–32)
BACTERIA URNS QL MICRO: ABNORMAL /HPF
BASOPHILS # BLD: 0 K/UL (ref 0–0.2)
BASOPHILS NFR BLD: 0 % (ref 0–1)
BILIRUB SERPL-MCNC: 1.8 MG/DL (ref 0.2–1.2)
BILIRUB UR QL STRIP: ABNORMAL
BUN SERPL-MCNC: 16 MG/DL (ref 8–23)
CALCIUM SERPL-MCNC: 8.7 MG/DL (ref 8.8–10.2)
CHLORIDE SERPL-SCNC: 102 MMOL/L (ref 98–111)
CLARITY UR: ABNORMAL
CO2 SERPL-SCNC: 23 MMOL/L (ref 22–29)
COLOR UR: ABNORMAL
CREAT SERPL-MCNC: 1 MG/DL (ref 0.5–0.9)
CRYSTALS URNS MICRO: ABNORMAL /HPF
EOSINOPHIL # BLD: 0 K/UL (ref 0–0.6)
EOSINOPHIL NFR BLD: 0 % (ref 0–5)
EPI CELLS #/AREA URNS AUTO: 8 /HPF (ref 0–5)
ERYTHROCYTE [DISTWIDTH] IN BLOOD BY AUTOMATED COUNT: 16.1 % (ref 11.5–14.5)
GLUCOSE SERPL-MCNC: 101 MG/DL (ref 74–109)
GLUCOSE UR STRIP.AUTO-MCNC: NEGATIVE MG/DL
HCT VFR BLD AUTO: 30 % (ref 37–47)
HGB BLD-MCNC: 9.5 G/DL (ref 12–16)
HGB UR STRIP.AUTO-MCNC: NEGATIVE MG/L
HYALINE CASTS #/AREA URNS AUTO: 24 /HPF (ref 0–8)
HYPOCHROMIA BLD QL SMEAR: ABNORMAL
IMM GRANULOCYTES # BLD: 0.1 K/UL
KETONES UR STRIP.AUTO-MCNC: ABNORMAL MG/DL
LEUKOCYTE ESTERASE UR QL STRIP.AUTO: ABNORMAL
LIPASE SERPL-CCNC: 22 U/L (ref 13–60)
LYMPHOCYTES # BLD: 2.8 K/UL (ref 1.1–4.5)
LYMPHOCYTES NFR BLD: 18 % (ref 20–40)
MAGNESIUM SERPL-MCNC: 2.1 MG/DL (ref 1.6–2.4)
MCH RBC QN AUTO: 27.5 PG (ref 27–31)
MCHC RBC AUTO-ENTMCNC: 31.7 G/DL (ref 33–37)
MCV RBC AUTO: 86.7 FL (ref 81–99)
MONOCYTES # BLD: 0.7 K/UL (ref 0–0.9)
MONOCYTES NFR BLD: 14 % (ref 0–10)
NEUTROPHILS # BLD: 1.7 K/UL (ref 1.5–7.5)
NEUTS SEG NFR BLD: 33 % (ref 50–65)
NITRITE UR QL STRIP.AUTO: POSITIVE
PH UR STRIP.AUTO: 5.5 [PH] (ref 5–8)
PLATELET # BLD AUTO: 183 K/UL (ref 130–400)
PLATELET SLIDE REVIEW: ADEQUATE
PMV BLD AUTO: 8.6 FL (ref 9.4–12.3)
POTASSIUM SERPL-SCNC: 3.4 MMOL/L (ref 3.5–5)
PROT SERPL-MCNC: 6.6 G/DL (ref 6.6–8.7)
PROT UR STRIP.AUTO-MCNC: 30 MG/DL
RBC # BLD AUTO: 3.46 M/UL (ref 4.2–5.4)
RBC #/AREA URNS AUTO: 2 /HPF (ref 0–4)
SARS-COV-2 RDRP RESP QL NAA+PROBE: NOT DETECTED
SODIUM SERPL-SCNC: 137 MMOL/L (ref 136–145)
SP GR UR STRIP.AUTO: 1.02 (ref 1–1.03)
UROBILINOGEN UR STRIP.AUTO-MCNC: 2 E.U./DL
VARIANT LYMPHS NFR BLD: 35 % (ref 0–8)
WBC # BLD AUTO: 5.2 K/UL (ref 4.8–10.8)
WBC #/AREA URNS AUTO: 16 /HPF (ref 0–5)

## 2024-08-02 PROCEDURE — 87077 CULTURE AEROBIC IDENTIFY: CPT

## 2024-08-02 PROCEDURE — 6360000002 HC RX W HCPCS: Performed by: STUDENT IN AN ORGANIZED HEALTH CARE EDUCATION/TRAINING PROGRAM

## 2024-08-02 PROCEDURE — 96374 THER/PROPH/DIAG INJ IV PUSH: CPT

## 2024-08-02 PROCEDURE — 87186 SC STD MICRODIL/AGAR DIL: CPT

## 2024-08-02 PROCEDURE — 96375 TX/PRO/DX INJ NEW DRUG ADDON: CPT

## 2024-08-02 PROCEDURE — 83690 ASSAY OF LIPASE: CPT

## 2024-08-02 PROCEDURE — 85025 COMPLETE CBC W/AUTO DIFF WBC: CPT

## 2024-08-02 PROCEDURE — 83735 ASSAY OF MAGNESIUM: CPT

## 2024-08-02 PROCEDURE — 80053 COMPREHEN METABOLIC PANEL: CPT

## 2024-08-02 PROCEDURE — 2580000003 HC RX 258: Performed by: STUDENT IN AN ORGANIZED HEALTH CARE EDUCATION/TRAINING PROGRAM

## 2024-08-02 PROCEDURE — 99285 EMERGENCY DEPT VISIT HI MDM: CPT

## 2024-08-02 PROCEDURE — 36415 COLL VENOUS BLD VENIPUNCTURE: CPT

## 2024-08-02 PROCEDURE — 6360000004 HC RX CONTRAST MEDICATION: Performed by: STUDENT IN AN ORGANIZED HEALTH CARE EDUCATION/TRAINING PROGRAM

## 2024-08-02 PROCEDURE — 71045 X-RAY EXAM CHEST 1 VIEW: CPT

## 2024-08-02 PROCEDURE — 81001 URINALYSIS AUTO W/SCOPE: CPT

## 2024-08-02 PROCEDURE — 87086 URINE CULTURE/COLONY COUNT: CPT

## 2024-08-02 PROCEDURE — 87635 SARS-COV-2 COVID-19 AMP PRB: CPT

## 2024-08-02 PROCEDURE — 74177 CT ABD & PELVIS W/CONTRAST: CPT

## 2024-08-02 RX ORDER — METHOTREXATE 2.5 MG/1
TABLET ORAL WEEKLY
COMMUNITY

## 2024-08-02 RX ORDER — CEPHALEXIN 500 MG/1
500 CAPSULE ORAL 2 TIMES DAILY
Qty: 10 CAPSULE | Refills: 0 | Status: SHIPPED | OUTPATIENT
Start: 2024-08-03 | End: 2024-08-08

## 2024-08-02 RX ORDER — ONDANSETRON 2 MG/ML
4 INJECTION INTRAMUSCULAR; INTRAVENOUS ONCE
Status: COMPLETED | OUTPATIENT
Start: 2024-08-02 | End: 2024-08-02

## 2024-08-02 RX ORDER — 0.9 % SODIUM CHLORIDE 0.9 %
1000 INTRAVENOUS SOLUTION INTRAVENOUS ONCE
Status: COMPLETED | OUTPATIENT
Start: 2024-08-02 | End: 2024-08-02

## 2024-08-02 RX ORDER — ONDANSETRON 4 MG/1
4 TABLET, ORALLY DISINTEGRATING ORAL 3 TIMES DAILY PRN
Qty: 21 TABLET | Refills: 0 | Status: SHIPPED | OUTPATIENT
Start: 2024-08-02

## 2024-08-02 RX ADMIN — SODIUM CHLORIDE 1000 ML: 9 INJECTION, SOLUTION INTRAVENOUS at 11:10

## 2024-08-02 RX ADMIN — IOPAMIDOL 75 ML: 755 INJECTION, SOLUTION INTRAVENOUS at 12:26

## 2024-08-02 RX ADMIN — WATER 1000 MG: 1 INJECTION INTRAMUSCULAR; INTRAVENOUS; SUBCUTANEOUS at 12:43

## 2024-08-02 RX ADMIN — ONDANSETRON 4 MG: 2 INJECTION INTRAMUSCULAR; INTRAVENOUS at 11:10

## 2024-08-02 ASSESSMENT — ENCOUNTER SYMPTOMS
VOMITING: 0
ABDOMINAL PAIN: 0
CHEST TIGHTNESS: 0
NAUSEA: 1
EYE PAIN: 0
SHORTNESS OF BREATH: 0
EYE REDNESS: 0
DIARRHEA: 1
SORE THROAT: 0
COUGH: 0

## 2024-08-02 NOTE — ED PROVIDER NOTES
Diagnosis Date    Chronic sinusitis     Cough     chronic during covid; \"long covid\", \"under control\"    COVID-19 03/2022    chest congestion; mild sob    Depression     Eosinophilic asthma 11/30/2022    Generalized anxiety disorder     GERD (gastroesophageal reflux disease)     Headache     Hyperlipidemia     Hypertension     Insomnia     Moderate COPD (chronic obstructive pulmonary disease) (HCC) 11/30/2022    Osteoarthritis     Post-menopausal     Rheumatoid arthritis (HCC)     Vitamin D deficiency          SURGICAL HISTORY       Past Surgical History:   Procedure Laterality Date    ADENOIDECTOMY      BREAST BIOPSY      BREAST BIOPSY      BRONCHOSCOPY N/A 02/02/2023    BRONCHOSCOPY ENDOBRONCHIAL ULTRASOUND performed by Gabriella Aguirre MD at [facility] Endoscopy    COLONOSCOPY  05/14/2015    Dr Manning-Diverticulosis, 5 yr recall    COLONOSCOPY N/A 02/10/2022    Dr BERNABE Torres-Questionable lipoma-BCM, 5 yr recall    ESOPHAGEAL DILATATION  03/23/2023    Dr BERNABE Torres-w/lze-[id number]-Uluvgosypufr, benign appearing stricture in the distal esophagus,  small gastric inlet patch in the upper esophagus, no Cole's, HH-Gastritis, no h pylori    OVARY REMOVAL Right     SINUS ENDOSCOPY N/A 5/12/2023    SINUS ENDOSCOPY MAXILLARY ANTROSTOMY WITH TISSUE REMOVAL ALONG WITH MAXILLARY & ANTERIOR ETHMOIDECTOMY & POLYPS REMOVAL WITH NAVIGATION performed by Aaron Mueller MD at [facility] OR    TONSILLECTOMY      UPPER GASTROINTESTINAL ENDOSCOPY N/A 03/23/2023    Dr BERNABE Torres-allan/[id number]-[id number]-Ofoxpmvqdqvv, benign appearing stricture in the distal esophagus,  small gastric inlet patch in the upper esophagus, no Cole's, HH-Gastritis, no h pylori    US THYROID BIOPSY  10/19/2022    US THYROID BIOPSY 10/19/2022 [facility] ULTRASOUND         CURRENT MEDICATIONS     Discharge Medication List as of 8/2/2024  1:05 PM        CONTINUE these medications which have NOT CHANGED    Details   methotrexate (RHEUMATREX) 2.5 MG chemo tablet Take by mouth once a weekHistorical

## 2024-08-04 LAB
BACTERIA UR CULT: ABNORMAL
ORGANISM: ABNORMAL
ORGANISM: ABNORMAL

## 2024-08-07 DIAGNOSIS — K21.9 GASTROESOPHAGEAL REFLUX DISEASE WITHOUT ESOPHAGITIS: ICD-10-CM

## 2024-08-07 RX ORDER — METOPROLOL SUCCINATE 50 MG/1
TABLET, EXTENDED RELEASE ORAL
Qty: 90 TABLET | Refills: 1 | Status: SHIPPED | OUTPATIENT
Start: 2024-08-07

## 2024-08-07 NOTE — TELEPHONE ENCOUNTER
08- Called patient lvm for a return call as that the pharmacy has requested a refill on your current medication.  If regular medications are being prescribed our Providers prefers that patients have a yearly follow up apt.     Your last apt was on 01- allan LAWSON

## 2024-08-07 NOTE — TELEPHONE ENCOUNTER
Krystin Johnson called to request a refill on her medication.      Last office visit : 7/17/2024   Next office visit : 1/15/2025     Requested Prescriptions     Pending Prescriptions Disp Refills    metoprolol succinate (TOPROL XL) 50 MG extended release tablet [Pharmacy Med Name: METOPROLOL ER SUCCINATE 50MG TABS] 90 tablet 1     Sig: TAKE 1 TABLET BY MOUTH DAILY            Mary Izquierdo MA

## 2024-08-08 NOTE — TELEPHONE ENCOUNTER
08- Called patient LVM x 2 for the patient that the pharmacy has requested a refill on your current medication.  If regular medications are being prescribed our Providers prefers that patients have a yearly follow up apt.     Your last apt was on 01- allan LAWSON     Please contact the office at [phone] to schedule at apt

## 2024-08-09 RX ORDER — PANTOPRAZOLE SODIUM 40 MG/1
40 TABLET, DELAYED RELEASE ORAL 2 TIMES DAILY
Qty: 180 TABLET | Refills: 3 | OUTPATIENT
Start: 2024-08-09

## 2024-08-29 RX ORDER — ERGOCALCIFEROL 1.25 MG/1
CAPSULE, LIQUID FILLED ORAL
Qty: 4 CAPSULE | Refills: 5 | Status: SHIPPED | OUTPATIENT
Start: 2024-08-29

## 2024-08-29 NOTE — TELEPHONE ENCOUNTER
Krystin Johnson called to request a refill on her medication.      Last office visit : 7/17/2024   Next office visit : 1/15/2025     Requested Prescriptions     Pending Prescriptions Disp Refills    vitamin D (ERGOCALCIFEROL) 1.25 MG (99694 UT) CAPS capsule [Pharmacy Med Name: VITAMIN D2 50,000IU (ERGO) CAP RX] 4 capsule 5     Sig: TAKE 1 CAPSULE BY MOUTH EVERY WEDNESDAY            Mary Izquierdo MA

## 2024-09-16 ENCOUNTER — TELEPHONE (OUTPATIENT)
Dept: PULMONOLOGY | Age: 64
End: 2024-09-16

## 2024-09-24 DIAGNOSIS — J45.50 SEVERE PERSISTENT REACTIVE AIRWAY DISEASE WITHOUT COMPLICATION: ICD-10-CM

## 2024-09-24 DIAGNOSIS — F51.01 PRIMARY INSOMNIA: ICD-10-CM

## 2024-09-24 DIAGNOSIS — K21.9 GASTROESOPHAGEAL REFLUX DISEASE WITHOUT ESOPHAGITIS: ICD-10-CM

## 2024-09-24 RX ORDER — BUSPIRONE HYDROCHLORIDE 15 MG/1
15 TABLET ORAL 2 TIMES DAILY PRN
Qty: 60 TABLET | Refills: 2 | Status: SHIPPED | OUTPATIENT
Start: 2024-09-24

## 2024-09-24 RX ORDER — TRIAMCINOLONE ACETONIDE 0.25 MG/G
OINTMENT TOPICAL
Qty: 80 G | Refills: 0 | Status: SHIPPED | OUTPATIENT
Start: 2024-09-24

## 2024-09-24 RX ORDER — MONTELUKAST SODIUM 10 MG/1
10 TABLET ORAL NIGHTLY
Qty: 30 TABLET | Refills: 5 | Status: SHIPPED | OUTPATIENT
Start: 2024-09-24

## 2024-09-24 RX ORDER — ZOLPIDEM TARTRATE 10 MG/1
10 TABLET ORAL NIGHTLY PRN
Qty: 30 TABLET | Refills: 0 | OUTPATIENT
Start: 2024-09-24 | End: 2024-10-24

## 2024-09-26 RX ORDER — PANTOPRAZOLE SODIUM 40 MG/1
40 TABLET, DELAYED RELEASE ORAL 2 TIMES DAILY
Qty: 180 TABLET | Refills: 3 | OUTPATIENT
Start: 2024-09-26

## 2024-09-26 RX ORDER — FLUTICASONE PROPIONATE AND SALMETEROL 50; 500 UG/1; UG/1
POWDER RESPIRATORY (INHALATION)
Qty: 60 EACH | Refills: 11 | Status: SHIPPED | OUTPATIENT
Start: 2024-09-26

## 2024-10-09 ENCOUNTER — OFFICE VISIT (OUTPATIENT)
Dept: INTERNAL MEDICINE | Age: 64
End: 2024-10-09
Payer: MEDICARE

## 2024-10-09 VITALS
DIASTOLIC BLOOD PRESSURE: 80 MMHG | TEMPERATURE: 98.5 F | WEIGHT: 175 LBS | OXYGEN SATURATION: 97 % | BODY MASS INDEX: 29.12 KG/M2 | HEART RATE: 68 BPM | SYSTOLIC BLOOD PRESSURE: 138 MMHG

## 2024-10-09 DIAGNOSIS — L23.7 POISON IVY DERMATITIS: Primary | ICD-10-CM

## 2024-10-09 PROCEDURE — G8417 CALC BMI ABV UP PARAM F/U: HCPCS | Performed by: NURSE PRACTITIONER

## 2024-10-09 PROCEDURE — 3079F DIAST BP 80-89 MM HG: CPT | Performed by: NURSE PRACTITIONER

## 2024-10-09 PROCEDURE — 1036F TOBACCO NON-USER: CPT | Performed by: NURSE PRACTITIONER

## 2024-10-09 PROCEDURE — 3075F SYST BP GE 130 - 139MM HG: CPT | Performed by: NURSE PRACTITIONER

## 2024-10-09 PROCEDURE — G8484 FLU IMMUNIZE NO ADMIN: HCPCS | Performed by: NURSE PRACTITIONER

## 2024-10-09 PROCEDURE — 96372 THER/PROPH/DIAG INJ SC/IM: CPT | Performed by: NURSE PRACTITIONER

## 2024-10-09 PROCEDURE — 3017F COLORECTAL CA SCREEN DOC REV: CPT | Performed by: NURSE PRACTITIONER

## 2024-10-09 PROCEDURE — G8427 DOCREV CUR MEDS BY ELIG CLIN: HCPCS | Performed by: NURSE PRACTITIONER

## 2024-10-09 PROCEDURE — 1090F PRES/ABSN URINE INCON ASSESS: CPT | Performed by: NURSE PRACTITIONER

## 2024-10-09 PROCEDURE — 99213 OFFICE O/P EST LOW 20 MIN: CPT | Performed by: NURSE PRACTITIONER

## 2024-10-09 PROCEDURE — G8399 PT W/DXA RESULTS DOCUMENT: HCPCS | Performed by: NURSE PRACTITIONER

## 2024-10-09 PROCEDURE — 1123F ACP DISCUSS/DSCN MKR DOCD: CPT | Performed by: NURSE PRACTITIONER

## 2024-10-09 RX ORDER — METHYLPREDNISOLONE ACETATE 80 MG/ML
80 INJECTION, SUSPENSION INTRA-ARTICULAR; INTRALESIONAL; INTRAMUSCULAR; SOFT TISSUE ONCE
Status: COMPLETED | OUTPATIENT
Start: 2024-10-09 | End: 2024-10-09

## 2024-10-09 RX ADMIN — METHYLPREDNISOLONE ACETATE 80 MG: 80 INJECTION, SUSPENSION INTRA-ARTICULAR; INTRALESIONAL; INTRAMUSCULAR; SOFT TISSUE at 08:25

## 2024-10-09 NOTE — PROGRESS NOTES
After obtaining consent, and per orders of Margaret Seth, injection of Depo 80 given in Dorsogluteal Left by Vero Suiter, LAZARO. Patient tolerated injection well and left with no complaints.

## 2024-10-09 NOTE — PROGRESS NOTES
[facility]  [facility] INTERNAL MEDICINE  [address] [place] [address] DRIVE  [address]  [place] [address]  Dept: [phone]  Dept Fax: [phone]  Loc: [phone]    Krystin Johnson is a 64 y.o. female who presents today for her medical conditions/complaintsas noted below.  Krystin Johnson is c/o of Poison Ivy (Face and hand and feet very itchy)        HPI:   Krystin presents today with rash. Suspects it is poison ivy. It is on her face, hands, arms, and legs. It is very itchy. She has used benadryl cream.   Past Medical History:   Diagnosis Date    Chronic sinusitis     Cough     chronic during covid; \"long covid\", \"under control\"    COVID-19 03/2022    chest congestion; mild sob    Depression     Eosinophilic asthma 11/30/2022    Generalized anxiety disorder     GERD (gastroesophageal reflux disease)     Headache     Hyperlipidemia     Hypertension     Insomnia     Moderate COPD (chronic obstructive pulmonary disease) (HCC) 11/30/2022    Osteoarthritis     Post-menopausal     Rheumatoid arthritis (HCC)     Vitamin D deficiency      Past Surgical History:   Procedure Laterality Date    ADENOIDECTOMY      BREAST BIOPSY      BREAST BIOPSY      BRONCHOSCOPY N/A 02/02/2023    BRONCHOSCOPY ENDOBRONCHIAL ULTRASOUND performed by Gabriella Aguirre MD at [facility] Endoscopy    COLONOSCOPY  05/14/2015    Dr Manning-Diverticulosis, 5 yr recall    COLONOSCOPY N/A 02/10/2022    Dr BERNABE Torres-Questionable lipoma-BCM, 5 yr recall    ESOPHAGEAL DILATATION  03/23/2023    Dr BERNABE Torres-w/kwn-14V-Ouxjqqrgdscu, benign appearing stricture in the distal esophagus,  small gastric inlet patch in the upper esophagus, no Cole's, HH-Gastritis, no h pylori    OVARY REMOVAL Right     SINUS ENDOSCOPY N/A 5/12/2023    SINUS ENDOSCOPY MAXILLARY ANTROSTOMY WITH TISSUE REMOVAL ALONG WITH MAXILLARY & ANTERIOR ETHMOIDECTOMY & POLYPS REMOVAL WITH NAVIGATION performed by Aaron Mueller MD at [facility] OR    TONSILLECTOMY      UPPER

## 2024-11-26 RX ORDER — METOPROLOL SUCCINATE 50 MG/1
TABLET, EXTENDED RELEASE ORAL
Qty: 90 TABLET | Refills: 0 | Status: SHIPPED | OUTPATIENT
Start: 2024-11-26

## 2024-11-26 RX ORDER — IBANDRONATE SODIUM 150 MG/1
150 TABLET, FILM COATED ORAL
Qty: 3 TABLET | Refills: 0 | Status: SHIPPED | OUTPATIENT
Start: 2024-11-26

## 2024-11-26 NOTE — TELEPHONE ENCOUNTER
Krystin Johnson called to request a refill on her medication.      Last office visit : 10/9/2024   Next office visit : 1/15/2025     Requested Prescriptions     Pending Prescriptions Disp Refills    metoprolol succinate (TOPROL XL) 50 MG extended release tablet [Pharmacy Med Name: METOPROLOL ER SUCCINATE 50MG TABS] 90 tablet 1     Sig: TAKE 1 TABLET BY MOUTH DAILY            Mary Izquierdo MA

## 2024-11-29 DIAGNOSIS — J82.83 EOSINOPHILIC ASTHMA: ICD-10-CM

## 2024-12-02 RX ORDER — DUPILUMAB 300 MG/2ML
INJECTION, SOLUTION SUBCUTANEOUS
Qty: 12 ML | OUTPATIENT
Start: 2024-12-02

## 2025-01-03 RX ORDER — BUSPIRONE HYDROCHLORIDE 15 MG/1
15 TABLET ORAL 2 TIMES DAILY PRN
Qty: 60 TABLET | Refills: 2 | Status: SHIPPED | OUTPATIENT
Start: 2025-01-03

## 2025-01-03 NOTE — TELEPHONE ENCOUNTER
Krystin Johnson called to request a refill on her medication.      Last office visit : 10/9/2024   Next office visit : 1/15/2025     Requested Prescriptions     Pending Prescriptions Disp Refills    busPIRone (BUSPAR) 15 MG tablet 60 tablet 2     Sig: Take 15 mg by mouth 2 times daily as needed (anxiety)            Mary Izquierdo MA

## 2025-01-15 ENCOUNTER — OFFICE VISIT (OUTPATIENT)
Dept: INTERNAL MEDICINE | Age: 65
End: 2025-01-15

## 2025-01-15 VITALS
SYSTOLIC BLOOD PRESSURE: 122 MMHG | OXYGEN SATURATION: 97 % | HEIGHT: 65 IN | DIASTOLIC BLOOD PRESSURE: 80 MMHG | HEART RATE: 73 BPM | BODY MASS INDEX: 29.92 KG/M2 | WEIGHT: 179.6 LBS

## 2025-01-15 DIAGNOSIS — Z00.00 WELCOME TO MEDICARE PREVENTIVE VISIT: Primary | ICD-10-CM

## 2025-01-15 DIAGNOSIS — G89.29 CHRONIC PAIN OF BOTH KNEES: ICD-10-CM

## 2025-01-15 DIAGNOSIS — R94.4 DECREASED CALCULATED GFR: ICD-10-CM

## 2025-01-15 DIAGNOSIS — Z12.31 ENCOUNTER FOR SCREENING MAMMOGRAM FOR BREAST CANCER: ICD-10-CM

## 2025-01-15 DIAGNOSIS — M05.741 RHEUMATOID ARTHRITIS INVOLVING BOTH HANDS WITH POSITIVE RHEUMATOID FACTOR (HCC): ICD-10-CM

## 2025-01-15 DIAGNOSIS — E78.00 PURE HYPERCHOLESTEROLEMIA: ICD-10-CM

## 2025-01-15 DIAGNOSIS — M05.742 RHEUMATOID ARTHRITIS INVOLVING BOTH HANDS WITH POSITIVE RHEUMATOID FACTOR (HCC): ICD-10-CM

## 2025-01-15 DIAGNOSIS — I10 ESSENTIAL HYPERTENSION: ICD-10-CM

## 2025-01-15 DIAGNOSIS — M30.1 CHURG-STRAUSS SYNDROME WITH LUNG INVOLVEMENT (HCC): ICD-10-CM

## 2025-01-15 DIAGNOSIS — M25.561 CHRONIC PAIN OF BOTH KNEES: ICD-10-CM

## 2025-01-15 DIAGNOSIS — E55.9 VITAMIN D DEFICIENCY: ICD-10-CM

## 2025-01-15 DIAGNOSIS — M25.562 CHRONIC PAIN OF BOTH KNEES: ICD-10-CM

## 2025-01-15 DIAGNOSIS — D72.18 CHURG-STRAUSS SYNDROME WITH LUNG INVOLVEMENT (HCC): ICD-10-CM

## 2025-01-15 DIAGNOSIS — F51.01 PRIMARY INSOMNIA: ICD-10-CM

## 2025-01-15 DIAGNOSIS — M81.6 LOCALIZED OSTEOPOROSIS WITHOUT CURRENT PATHOLOGICAL FRACTURE: ICD-10-CM

## 2025-01-15 DIAGNOSIS — Z00.00 ANNUAL PHYSICAL EXAM: ICD-10-CM

## 2025-01-15 DIAGNOSIS — R79.89 ABNORMAL CBC: ICD-10-CM

## 2025-01-15 LAB
25(OH)D3 SERPL-MCNC: 55.3 NG/ML
ALBUMIN SERPL-MCNC: 4.2 G/DL (ref 3.5–5.2)
ALP SERPL-CCNC: 104 U/L (ref 35–104)
ALT SERPL-CCNC: 16 U/L (ref 5–33)
ANION GAP SERPL CALCULATED.3IONS-SCNC: 9 MMOL/L (ref 7–19)
AST SERPL-CCNC: 20 U/L (ref 5–32)
BACTERIA URNS QL MICRO: NEGATIVE /HPF
BASOPHILS # BLD: 0.1 K/UL (ref 0–0.2)
BASOPHILS NFR BLD: 0.9 % (ref 0–1)
BILIRUB SERPL-MCNC: 0.7 MG/DL (ref 0.2–1.2)
BILIRUB UR QL STRIP: NEGATIVE
BUN SERPL-MCNC: 16 MG/DL (ref 8–23)
CALCIUM SERPL-MCNC: 9.4 MG/DL (ref 8.8–10.2)
CHLORIDE SERPL-SCNC: 103 MMOL/L (ref 98–111)
CHOLEST SERPL-MCNC: 162 MG/DL (ref 0–199)
CLARITY UR: CLEAR
CO2 SERPL-SCNC: 29 MMOL/L (ref 22–29)
COLOR UR: YELLOW
CREAT SERPL-MCNC: 1.1 MG/DL (ref 0.5–0.9)
CRYSTALS URNS MICRO: NORMAL /HPF
EOSINOPHIL # BLD: 0.3 K/UL (ref 0–0.6)
EOSINOPHIL NFR BLD: 4.6 % (ref 0–5)
EPI CELLS #/AREA URNS AUTO: 4 /HPF (ref 0–5)
ERYTHROCYTE [DISTWIDTH] IN BLOOD BY AUTOMATED COUNT: 14.6 % (ref 11.5–14.5)
GLUCOSE SERPL-MCNC: 89 MG/DL (ref 70–99)
GLUCOSE UR STRIP.AUTO-MCNC: NEGATIVE MG/DL
HBA1C MFR BLD: 4.8 % (ref 4–5.6)
HCT VFR BLD AUTO: 40.2 % (ref 37–47)
HDLC SERPL-MCNC: 64 MG/DL (ref 40–60)
HGB BLD-MCNC: 12.7 G/DL (ref 12–16)
HGB UR STRIP.AUTO-MCNC: NEGATIVE MG/L
HYALINE CASTS #/AREA URNS AUTO: 2 /HPF (ref 0–8)
IMM GRANULOCYTES # BLD: 0 K/UL
KETONES UR STRIP.AUTO-MCNC: NEGATIVE MG/DL
LDLC SERPL CALC-MCNC: 79 MG/DL
LEUKOCYTE ESTERASE UR QL STRIP.AUTO: ABNORMAL
LYMPHOCYTES # BLD: 1.6 K/UL (ref 1.1–4.5)
LYMPHOCYTES NFR BLD: 24.8 % (ref 20–40)
MCH RBC QN AUTO: 28.2 PG (ref 27–31)
MCHC RBC AUTO-ENTMCNC: 31.6 G/DL (ref 33–37)
MCV RBC AUTO: 89.3 FL (ref 81–99)
MONOCYTES # BLD: 0.7 K/UL (ref 0–0.9)
MONOCYTES NFR BLD: 10.6 % (ref 0–10)
NEUTROPHILS # BLD: 3.8 K/UL (ref 1.5–7.5)
NEUTS SEG NFR BLD: 58.5 % (ref 50–65)
NITRITE UR QL STRIP.AUTO: NEGATIVE
PH UR STRIP.AUTO: 7 [PH] (ref 5–8)
PLATELET # BLD AUTO: 281 K/UL (ref 130–400)
PMV BLD AUTO: 9.6 FL (ref 9.4–12.3)
POTASSIUM SERPL-SCNC: 4.4 MMOL/L (ref 3.5–5)
PROT SERPL-MCNC: 6.8 G/DL (ref 6.4–8.3)
PROT UR STRIP.AUTO-MCNC: NEGATIVE MG/DL
RBC # BLD AUTO: 4.5 M/UL (ref 4.2–5.4)
RBC #/AREA URNS AUTO: 0 /HPF (ref 0–4)
SODIUM SERPL-SCNC: 141 MMOL/L (ref 136–145)
SP GR UR STRIP.AUTO: 1.02 (ref 1–1.03)
TRIGL SERPL-MCNC: 94 MG/DL (ref 0–149)
TSH SERPL DL<=0.005 MIU/L-ACNC: 2.46 UIU/ML (ref 0.27–4.2)
UROBILINOGEN UR STRIP.AUTO-MCNC: 1 E.U./DL
WBC # BLD AUTO: 6.5 K/UL (ref 4.8–10.8)
WBC #/AREA URNS AUTO: 1 /HPF (ref 0–5)

## 2025-01-15 RX ORDER — HYDROCODONE BITARTRATE AND ACETAMINOPHEN 7.5; 325 MG/1; MG/1
1 TABLET ORAL DAILY PRN
Qty: 30 TABLET | Refills: 0 | Status: SHIPPED | OUTPATIENT
Start: 2025-01-15 | End: 2025-02-14

## 2025-01-15 RX ORDER — FLUOXETINE 40 MG/1
40 CAPSULE ORAL DAILY
Qty: 90 CAPSULE | Refills: 1 | Status: SHIPPED | OUTPATIENT
Start: 2025-01-15

## 2025-01-15 SDOH — ECONOMIC STABILITY: FOOD INSECURITY: WITHIN THE PAST 12 MONTHS, THE FOOD YOU BOUGHT JUST DIDN'T LAST AND YOU DIDN'T HAVE MONEY TO GET MORE.: NEVER TRUE

## 2025-01-15 SDOH — ECONOMIC STABILITY: FOOD INSECURITY: WITHIN THE PAST 12 MONTHS, YOU WORRIED THAT YOUR FOOD WOULD RUN OUT BEFORE YOU GOT MONEY TO BUY MORE.: NEVER TRUE

## 2025-01-15 ASSESSMENT — PATIENT HEALTH QUESTIONNAIRE - PHQ9
3. TROUBLE FALLING OR STAYING ASLEEP: SEVERAL DAYS
4. FEELING TIRED OR HAVING LITTLE ENERGY: SEVERAL DAYS
6. FEELING BAD ABOUT YOURSELF - OR THAT YOU ARE A FAILURE OR HAVE LET YOURSELF OR YOUR FAMILY DOWN: NOT AT ALL
SUM OF ALL RESPONSES TO PHQ QUESTIONS 1-9: 3
7. TROUBLE CONCENTRATING ON THINGS, SUCH AS READING THE NEWSPAPER OR WATCHING TELEVISION: SEVERAL DAYS
SUM OF ALL RESPONSES TO PHQ QUESTIONS 1-9: 3
SUM OF ALL RESPONSES TO PHQ9 QUESTIONS 1 & 2: 0
SUM OF ALL RESPONSES TO PHQ QUESTIONS 1-9: 3
9. THOUGHTS THAT YOU WOULD BE BETTER OFF DEAD, OR OF HURTING YOURSELF: NOT AT ALL
2. FEELING DOWN, DEPRESSED OR HOPELESS: NOT AT ALL
SUM OF ALL RESPONSES TO PHQ QUESTIONS 1-9: 3
1. LITTLE INTEREST OR PLEASURE IN DOING THINGS: NOT AT ALL
10. IF YOU CHECKED OFF ANY PROBLEMS, HOW DIFFICULT HAVE THESE PROBLEMS MADE IT FOR YOU TO DO YOUR WORK, TAKE CARE OF THINGS AT HOME, OR GET ALONG WITH OTHER PEOPLE: SOMEWHAT DIFFICULT
8. MOVING OR SPEAKING SO SLOWLY THAT OTHER PEOPLE COULD HAVE NOTICED. OR THE OPPOSITE, BEING SO FIGETY OR RESTLESS THAT YOU HAVE BEEN MOVING AROUND A LOT MORE THAN USUAL: NOT AT ALL
5. POOR APPETITE OR OVEREATING: NOT AT ALL

## 2025-01-15 ASSESSMENT — ENCOUNTER SYMPTOMS
CONSTIPATION: 0
ABDOMINAL PAIN: 0
COUGH: 1
WHEEZING: 0
CHEST TIGHTNESS: 0
SORE THROAT: 0

## 2025-01-15 ASSESSMENT — LIFESTYLE VARIABLES
HOW MANY STANDARD DRINKS CONTAINING ALCOHOL DO YOU HAVE ON A TYPICAL DAY: 1 OR 2
HOW OFTEN DO YOU HAVE A DRINK CONTAINING ALCOHOL: MONTHLY OR LESS

## 2025-01-15 NOTE — PROGRESS NOTES
chronic during covid; \"long covid\", \"under control\"    COVID-19 03/2022    chest congestion; mild sob    Depression     Eosinophilic asthma 11/30/2022    Generalized anxiety disorder     GERD (gastroesophageal reflux disease)     Headache     Hyperlipidemia     Hypertension     Insomnia     Moderate COPD (chronic obstructive pulmonary disease) (HCC) 11/30/2022    Osteoarthritis     Post-menopausal     Rheumatoid arthritis (HCC)     Vitamin D deficiency       Past Surgical History:   Procedure Laterality Date    ADENOIDECTOMY      BREAST BIOPSY      BREAST BIOPSY      BRONCHOSCOPY N/A 02/02/2023    BRONCHOSCOPY ENDOBRONCHIAL ULTRASOUND performed by Gabriella Aguirre MD at [facility] Endoscopy    COLONOSCOPY  05/14/2015    Dr Manning-Diverticulosis, 5 yr recall    COLONOSCOPY N/A 02/10/2022    Dr BERNABE Torres-Questionable lipoma-BCM, 5 yr recall    ESOPHAGEAL DILATATION  03/23/2023    Dr BERNABE Torres-w/[id number]-82O-Hchruakjuwle, benign appearing stricture in the distal esophagus,  small gastric inlet patch in the upper esophagus, no Cole's, HH-Gastritis, no h pylori    OVARY REMOVAL Right     SINUS ENDOSCOPY N/A 5/12/2023    SINUS ENDOSCOPY MAXILLARY ANTROSTOMY WITH TISSUE REMOVAL ALONG WITH MAXILLARY & ANTERIOR ETHMOIDECTOMY & POLYPS REMOVAL WITH NAVIGATION performed by Aaron Mueller MD at [facility] OR    TONSILLECTOMY      UPPER GASTROINTESTINAL ENDOSCOPY N/A 03/23/2023    Dr BERNABE Torres-w/nde-[id number]-Qmgqqkhhokvz, benign appearing stricture in the distal esophagus,  small gastric inlet patch in the upper esophagus, no Cole's, HH-Gastritis, no h pylori    US BIOPSY THYROID  10/19/2022    US THYROID BIOPSY 10/19/2022 [facility] ULTRASOUND     Current Outpatient Medications   Medication Sig Dispense Refill    FLUoxetine (PROZAC) 40 MG capsule Take 1 capsule by mouth daily 90 capsule 1    HYDROcodone-acetaminophen (NORCO) 7.5-325 MG per tablet Take 1 tablet by mouth daily as needed for Pain for up to 30 days. 30 tablet 0    busPIRone (BUSPAR) 
Instructions/AVS.     Reviewed and updated this visit:  Tobacco  Allergies  Meds  Med Hx  Surg Hx  Soc Hx  Fam Hx      Medicare welcome health maintenance  * cscope 2022- fu 5 yr  * mammogram - schedule  * PAP 2023- further  * bone density 3/2021- needs repeat- murali  *Electrocardiogram Medicare welcome  Sinus rhythm 69 bpm, first-degree AV block, nonspecific ST-T abnormalities  No change compared to electrocardiogram 1/40/2024  Vision screen Medicare welcome  Right 20/20, left 20/15, bilateral 20/20 corrected  Pneumonia vaccination, patient states wants to discuss this with her pulmonologist at [facility]  RSV vaccination-patient was discussed with pulmonologist  Jyoti this time wants to wait    Patient Instructions        Preventing Falls: Care Instructions  Injuries and health problems such as trouble walking or poor eyesight can increase your risk of falling. So can some medicines. But there are things you can do to help prevent falls. You can exercise to get stronger. You can also arrange your home to make it safer.    Talk to your doctor about the medicines you take. Ask if any of them increase the risk of falls and whether they can be changed or stopped.   Try to exercise regularly. It can help improve your strength and balance. This can help lower your risk of falling.         Practice fall safety and prevention.   Wear low-heeled shoes that fit well and give your feet good support. Talk to your doctor if you have foot problems that make this hard.  Carry a cellphone or wear a medical alert device that you can use to call for help.  Use stepladders instead of chairs to reach high objects. Don't climb if you're at risk for falls. Ask for help, if needed.  Wear the correct eyeglasses, if you need them.        Make your home safer.   Remove rugs, cords, clutter, and furniture from walkways.  Keep your house well lit. Use night-lights in hallways and bathrooms.  Install and use sturdy handrails on

## 2025-01-15 NOTE — PATIENT INSTRUCTIONS
pop.   Heart-healthy lifestyle    If your doctor recommends it, get more exercise. For many people, walking is a good choice. Or you may want to swim, bike, or do other activities. Bit by bit, increase the time you're active every day. Try for at least 30 minutes on most days of the week.     Try to quit or cut back on using tobacco and other nicotine products. This includes smoking and vaping. If you need help quitting, talk to your doctor about stop-smoking programs and medicines. These can increase your chances of quitting for good. Quitting is one of the most important things you can do to protect your heart. It is never too late to quit. Try to avoid secondhand smoke too.     Stay at a weight that's healthy for you. Talk to your doctor if you need help losing weight.     Try to get 7 to 9 hours of sleep each night.     Limit alcohol to 2 drinks a day for men and 1 drink a day for women. Too much alcohol can cause health problems.     Manage other health problems such as diabetes, high blood pressure, and high cholesterol. If you think you may have a problem with alcohol or drug use, talk to your doctor.   Medicines    Take your medicines exactly as prescribed. Call your doctor if you think you are having a problem with your medicine.     If your doctor recommends aspirin, take the amount directed each day. Make sure you take aspirin and not another kind of pain reliever, such as acetaminophen (Tylenol).   When should you call for help?   Call 911 if you have symptoms of a heart attack. These may include:    Chest pain or pressure, or a strange feeling in the chest.     Sweating.     Shortness of breath.     Pain, pressure, or a strange feeling in the back, neck, jaw, or upper belly or in one or both shoulders or arms.     Lightheadedness or sudden weakness.     A fast or irregular heartbeat.   After you call 911, the  may tell you to chew 1 adult-strength or 2 to 4 low-dose aspirin. Wait for an

## 2025-01-16 ENCOUNTER — TELEPHONE (OUTPATIENT)
Dept: INTERNAL MEDICINE | Age: 65
End: 2025-01-16

## 2025-01-16 NOTE — TELEPHONE ENCOUNTER
[facility] radiology called to get a screening mammogram order for patient.    Fax number [phone]

## 2025-01-21 DIAGNOSIS — R92.8 ABNORMAL MAMMOGRAM OF RIGHT BREAST: Primary | ICD-10-CM

## 2025-01-22 DIAGNOSIS — Z12.31 ENCOUNTER FOR SCREENING MAMMOGRAM FOR BREAST CANCER: ICD-10-CM

## 2025-01-23 ENCOUNTER — TELEPHONE (OUTPATIENT)
Dept: INTERNAL MEDICINE | Age: 65
End: 2025-01-23

## 2025-01-23 NOTE — TELEPHONE ENCOUNTER
[facility] radiology called to get an order for a right breast diagnostic mammogram and a right breast ultrasound      Fax number [phone]

## 2025-02-12 DIAGNOSIS — N60.01 BREAST CYST, RIGHT: Primary | ICD-10-CM

## 2025-02-12 DIAGNOSIS — R92.8 ABNORMAL MAMMOGRAM OF RIGHT BREAST: ICD-10-CM

## 2025-02-23 ENCOUNTER — APPOINTMENT (OUTPATIENT)
Dept: GENERAL RADIOLOGY | Age: 65
End: 2025-02-23
Payer: MEDICARE

## 2025-02-23 ENCOUNTER — HOSPITAL ENCOUNTER (EMERGENCY)
Age: 65
Discharge: HOME | End: 2025-02-23
Attending: EMERGENCY MEDICINE
Payer: MEDICARE

## 2025-02-23 VITALS
TEMPERATURE: 98.4 F | HEART RATE: 77 BPM | BODY MASS INDEX: 29.99 KG/M2 | DIASTOLIC BLOOD PRESSURE: 87 MMHG | SYSTOLIC BLOOD PRESSURE: 157 MMHG | RESPIRATION RATE: 18 BRPM | OXYGEN SATURATION: 95 % | HEIGHT: 65 IN | WEIGHT: 180 LBS

## 2025-02-23 DIAGNOSIS — S52.502A CLOSED FRACTURE OF DISTAL END OF LEFT RADIUS, UNSPECIFIED FRACTURE MORPHOLOGY, INITIAL ENCOUNTER: Primary | ICD-10-CM

## 2025-02-23 PROCEDURE — 73110 X-RAY EXAM OF WRIST: CPT

## 2025-02-23 PROCEDURE — 29125 APPL SHORT ARM SPLINT STATIC: CPT

## 2025-02-23 PROCEDURE — 6360000002 HC RX W HCPCS: Performed by: EMERGENCY MEDICINE

## 2025-02-23 PROCEDURE — 96372 THER/PROPH/DIAG INJ SC/IM: CPT

## 2025-02-23 PROCEDURE — 99284 EMERGENCY DEPT VISIT MOD MDM: CPT

## 2025-02-23 RX ORDER — HYDROCODONE BITARTRATE AND ACETAMINOPHEN 5; 325 MG/1; MG/1
1 TABLET ORAL EVERY 6 HOURS PRN
Qty: 10 TABLET | Refills: 0 | Status: SHIPPED | OUTPATIENT
Start: 2025-02-23 | End: 2025-02-26

## 2025-02-23 RX ORDER — HYDROMORPHONE HYDROCHLORIDE 1 MG/ML
1 INJECTION, SOLUTION INTRAMUSCULAR; INTRAVENOUS; SUBCUTANEOUS ONCE
Status: COMPLETED | OUTPATIENT
Start: 2025-02-23 | End: 2025-02-23

## 2025-02-23 RX ADMIN — HYDROMORPHONE HYDROCHLORIDE 1 MG: 1 INJECTION, SOLUTION INTRAMUSCULAR; INTRAVENOUS; SUBCUTANEOUS at 06:57

## 2025-02-23 ASSESSMENT — PAIN - FUNCTIONAL ASSESSMENT: PAIN_FUNCTIONAL_ASSESSMENT: 0-10

## 2025-02-23 ASSESSMENT — PAIN SCALES - GENERAL
PAINLEVEL_OUTOF10: 8
PAINLEVEL_OUTOF10: 8

## 2025-02-23 NOTE — ED PROVIDER NOTES
UNITS CAPS    Take 1 capsule by mouth daily    DUPIXENT 300 MG/2ML SOPN INJECTION    INJECT 1 PEN (300 MG) UNDER THE SKIN EVERY 2 WEEKS    FLUOXETINE (PROZAC) 40 MG CAPSULE    Take 1 capsule by mouth daily    HYDROCHLOROTHIAZIDE 12.5 MG CAPSULE    Take 1 capsule by mouth every morning    IBANDRONATE (BONIVA) 150 MG TABLET    Take 1 tablet by mouth every 30 days    METHOTREXATE (RHEUMATREX) 2.5 MG CHEMO TABLET    Take by mouth once a week    METOPROLOL SUCCINATE (TOPROL XL) 50 MG EXTENDED RELEASE TABLET    TAKE 1 TABLET BY MOUTH DAILY    ONDANSETRON (ZOFRAN-ODT) 4 MG DISINTEGRATING TABLET    Take 1 tablet by mouth 3 times daily as needed for Nausea or Vomiting    PANTOPRAZOLE (PROTONIX) 40 MG TABLET    Take 1 tablet by mouth 2 times daily TAKE 1 TABLET BY MOUTH BID.    ROSUVASTATIN (CRESTOR) 10 MG TABLET    TAKE 1 TABLET BY MOUTH EVERY NIGHT AT BEDTIME    TRIAMCINOLONE (KENALOG) 0.025 % OINTMENT    APPLY TOPICALLY TO THE AFFECTED AREA TWICE DAILY    VITAMIN D (ERGOCALCIFEROL) 1.25 MG (88369 UT) CAPS CAPSULE    TAKE 1 CAPSULE BY MOUTH EVERY WEDNESDAY       ALLERGIES     Morphine and codeine    FAMILY HISTORY       Family History   Problem Relation Age of Onset    Lung Cancer Father     Colon Cancer Sister 58        COLON    Pancreatic Cancer Sister     Diabetes Brother     Coronary Art Dis Brother           SOCIAL HISTORY       Social History     Socioeconomic History    Marital status:      Spouse name: None    Number of children: 1    Years of education: None    Highest education level: None   Occupational History    Occupation: [employer]    Tobacco Use    Smoking status: Never    Smokeless tobacco: Never   Vaping Use    Vaping status: Never Used   Substance and Sexual Activity    Alcohol use: Yes     Comment: occ    Drug use: No    Sexual activity: Never     Social Determinants of Health     Financial Resource Strain: Low Risk  (7/17/2024)    Overall Financial Resource Strain (CARDIA)

## 2025-02-24 ENCOUNTER — TELEPHONE (OUTPATIENT)
Age: 65
End: 2025-02-24

## 2025-02-24 NOTE — TELEPHONE ENCOUNTER
Fx Sheet   Fx Lt Wrist  Vidhi 2-23  Yes X ray 2-23  DOI 2-22  Pt is in a soft cast  Pt asked to Please leave a message for appointment time and date

## 2025-02-25 ENCOUNTER — OFFICE VISIT (OUTPATIENT)
Age: 65
End: 2025-02-25

## 2025-02-25 VITALS — WEIGHT: 180 LBS | HEIGHT: 65 IN | BODY MASS INDEX: 29.99 KG/M2

## 2025-02-25 DIAGNOSIS — S52.592A OTHER CLOSED FRACTURE OF DISTAL END OF LEFT RADIUS, INITIAL ENCOUNTER: Primary | ICD-10-CM

## 2025-02-25 RX ORDER — IBANDRONATE SODIUM 150 MG/1
150 TABLET, FILM COATED ORAL
Qty: 3 TABLET | Refills: 0 | Status: SHIPPED | OUTPATIENT
Start: 2025-02-25

## 2025-02-25 RX ORDER — ZOLPIDEM TARTRATE 10 MG/1
10 TABLET ORAL NIGHTLY PRN
COMMUNITY
Start: 2025-01-07

## 2025-02-25 RX ORDER — METOPROLOL SUCCINATE 50 MG/1
TABLET, EXTENDED RELEASE ORAL
Qty: 90 TABLET | Refills: 0 | Status: SHIPPED | OUTPATIENT
Start: 2025-02-25

## 2025-02-25 ASSESSMENT — ENCOUNTER SYMPTOMS: SHORTNESS OF BREATH: 0

## 2025-02-25 NOTE — PROGRESS NOTES
[facility]  [facility] ORTHOPEDICS  [address] [facility] [place] [address]  Dept: [phone]  Dept Fax: [phone]  Loc: [phone]     Krystin Johnson (:  1960) is a 64 y.o. female,New patient, here for evaluation of the following:    Chief Complaint   Patient presents with    Wrist Injury     L wrist  DOI:            Subjective   Patient is a 64-year-old  female came to the clinic with a left wrist fracture.  She slipped on the ice on 2025.  She was seen at [facility] ER on 2025.  X-rays taken at the ER show I am mildly displaced left distal radius fracture.  Patient reports that she was very swollen the day she was seen.  She reports that she is having some discomfort when she lifts her arm upwards.  She reports she fell and heard a pop.    Wrist Injury   Pertinent negatives include no chest pain or numbness.       Allergies   Allergen Reactions    Morphine And Codeine Headaches     Past Surgical History:   Procedure Laterality Date    ADENOIDECTOMY      BREAST BIOPSY      BREAST BIOPSY      BRONCHOSCOPY N/A 2023    BRONCHOSCOPY ENDOBRONCHIAL ULTRASOUND performed by Gabriella Aguirre MD at [facility] Endoscopy    COLONOSCOPY  2015    Dr Manning-Diverticulosis, 5 yr recall    COLONOSCOPY N/A 02/10/2022    Dr BERNABE Torres-Questionable lipoma-BCM, 5 yr recall    ESOPHAGEAL DILATATION  2023    Dr BERNABE Banegas/yfb-40R-Rnxhhzryfwdz, benign appearing stricture in the distal esophagus,  small gastric inlet patch in the upper esophagus, no Cole's, HH-Gastritis, no h pylori    OVARY REMOVAL Right     SINUS ENDOSCOPY N/A 2023    SINUS ENDOSCOPY MAXILLARY ANTROSTOMY WITH TISSUE REMOVAL ALONG WITH MAXILLARY & ANTERIOR ETHMOIDECTOMY & POLYPS REMOVAL WITH NAVIGATION performed by Aaron Mueller MD at [facility] OR    TONSILLECTOMY      UPPER GASTROINTESTINAL ENDOSCOPY N/A 2023    Dr BERNABE Banegas/[id number]-[id number]-Sobbtslsenoz, benign

## 2025-02-25 NOTE — TELEPHONE ENCOUNTER
Krystin Johnson called to request a refill on her medication.      Last office visit : 1/15/2025   Next office visit : 4/15/2025     Requested Prescriptions     Pending Prescriptions Disp Refills    metoprolol succinate (TOPROL XL) 50 MG extended release tablet [Pharmacy Med Name: METOPROLOL ER SUCCINATE 50MG TABS] 90 tablet 0     Sig: TAKE 1 TABLET BY MOUTH DAILY    ibandronate (BONIVA) 150 MG tablet [Pharmacy Med Name: IBANDRONATE SODIUM 150MG TABLETS] 3 tablet 0     Sig: TAKE 1 TABLET BY MOUTH EVERY 30 DAYS            Mary Izquierdo MA

## 2025-02-25 NOTE — PROGRESS NOTES
Casting Notes:    Type of cast/splint:Arm, Forearm (SAC Ulnar Gutter Cast) Cast Application     Material Used:  1. 2 rolls 3 inch cast padding      2.      3.    Fiberglass Cast Tape: 2 rolls 2 inch fiberglass cast tape     Reason for Application:     ICD-10-CM    1. Other closed fracture of distal end of left radius, initial encounter  S52.592A OK CAST SUP SHT ARM ADULT FBRGL     APPLY FOREARM CAST      Patient was placed in a short arm fiberglass cast with no complications.     Patient was given cast care instructions, and told to call the office with any complaints, or concerns.     Patient was also told to keep their routine follow up appointment.    Ernie Cantu MA

## 2025-02-26 ASSESSMENT — ENCOUNTER SYMPTOMS
SHORTNESS OF BREATH: 0
BACK PAIN: 0

## 2025-03-12 DIAGNOSIS — F51.01 PRIMARY INSOMNIA: Primary | ICD-10-CM

## 2025-03-12 RX ORDER — ZOLPIDEM TARTRATE 10 MG/1
10 TABLET ORAL NIGHTLY PRN
Qty: 30 TABLET | Refills: 0 | Status: SHIPPED | OUTPATIENT
Start: 2025-03-12 | End: 2025-04-11

## 2025-03-12 NOTE — TELEPHONE ENCOUNTER
Krystin MAT Johnson called to request a refill on her medication.      Last office visit : 1/15/2025   Next office visit : 4/15/2025     Requested Prescriptions     Pending Prescriptions Disp Refills    zolpidem (AMBIEN) 10 MG tablet [Pharmacy Med Name: ZOLPIDEM 10MG TABLETS] 30 tablet      Sig: Take 1 tablet by mouth nightly as needed for Sleep for up to 30 days. Max Daily Amount: 10 mg            Mary Izquierdo MA

## 2025-03-25 ENCOUNTER — OFFICE VISIT (OUTPATIENT)
Age: 65
End: 2025-03-25
Payer: MEDICARE

## 2025-03-25 VITALS — HEIGHT: 65 IN | BODY MASS INDEX: 30.32 KG/M2 | WEIGHT: 182 LBS

## 2025-03-25 DIAGNOSIS — M25.532 LEFT WRIST PAIN: Primary | ICD-10-CM

## 2025-03-25 DIAGNOSIS — S52.532D CLOSED COLLES' FRACTURE OF LEFT RADIUS WITH ROUTINE HEALING, SUBSEQUENT ENCOUNTER: ICD-10-CM

## 2025-03-25 PROCEDURE — 1090F PRES/ABSN URINE INCON ASSESS: CPT | Performed by: PHYSICIAN ASSISTANT

## 2025-03-25 PROCEDURE — 1036F TOBACCO NON-USER: CPT | Performed by: PHYSICIAN ASSISTANT

## 2025-03-25 PROCEDURE — 3017F COLORECTAL CA SCREEN DOC REV: CPT | Performed by: PHYSICIAN ASSISTANT

## 2025-03-25 PROCEDURE — G8417 CALC BMI ABV UP PARAM F/U: HCPCS | Performed by: PHYSICIAN ASSISTANT

## 2025-03-25 PROCEDURE — G8427 DOCREV CUR MEDS BY ELIG CLIN: HCPCS | Performed by: PHYSICIAN ASSISTANT

## 2025-03-25 PROCEDURE — 99213 OFFICE O/P EST LOW 20 MIN: CPT | Performed by: PHYSICIAN ASSISTANT

## 2025-03-25 PROCEDURE — 1123F ACP DISCUSS/DSCN MKR DOCD: CPT | Performed by: PHYSICIAN ASSISTANT

## 2025-03-25 PROCEDURE — G8399 PT W/DXA RESULTS DOCUMENT: HCPCS | Performed by: PHYSICIAN ASSISTANT

## 2025-03-25 ASSESSMENT — ENCOUNTER SYMPTOMS: SHORTNESS OF BREATH: 0

## 2025-03-25 NOTE — PROGRESS NOTES
[facility]  [facility]  [address] [facility] [place] [address]  Dept: [phone]  Dept Fax: [phone]  Loc: [phone]     Krystin Johnson (:  1960) is a 64 y.o. female,Established patient, here for evaluation of the following:    Chief Complaint   Patient presents with    Follow-up     Left Wrist   DOI: 25           Subjective   Patient is a 64-year-old  female came to the clinic to follow-up of her left wrist fracture.  She fell on the ice on 2025.  She has been in a fiberglass cast since then.  She reports now that she is out of her cast she is a little pain but is not constant.  Otherwise she is doing well.        Allergies   Allergen Reactions    Morphine And Codeine Headaches     Past Surgical History:   Procedure Laterality Date    ADENOIDECTOMY      BREAST BIOPSY      BREAST BIOPSY      BRONCHOSCOPY N/A 2023    BRONCHOSCOPY ENDOBRONCHIAL ULTRASOUND performed by Gabriella Aguirre MD at [facility]    COLONOSCOPY  2015    Dr Manning-Diverticulosis, 5 yr recall    COLONOSCOPY N/A 02/10/2022    Dr BERNABE Torres-Questionable lipoma-BCM, 5 yr recall    ESOPHAGEAL DILATATION  2023    Dr BERNABE Torres-w/[id number]-Koydmtlzltok, benign appearing stricture in the distal esophagus,  small gastric inlet patch in the upper esophagus, no Cole's, HH-Gastritis, no h pylori    OVARY REMOVAL Right     SINUS ENDOSCOPY N/A 2023    SINUS ENDOSCOPY MAXILLARY ANTROSTOMY WITH TISSUE REMOVAL ALONG WITH MAXILLARY & ANTERIOR ETHMOIDECTOMY & POLYPS REMOVAL WITH NAVIGATION performed by Aaron Mueller MD at [facility] OR    TONSILLECTOMY      UPPER GASTROINTESTINAL ENDOSCOPY N/A 2023    Dr BERNABE Torres-allan/[id number]-Wegcbjijsnli, benign appearing stricture in the distal esophagus,  small gastric inlet patch in the upper esophagus, no Cole's, HH-Gastritis, no h pylori    US BIOPSY THYROID  10/19/2022    US THYROID BIOPSY 10/19/2022

## 2025-03-31 ENCOUNTER — OFFICE VISIT (OUTPATIENT)
Dept: INTERNAL MEDICINE | Age: 65
End: 2025-03-31
Payer: MEDICARE

## 2025-03-31 VITALS
HEIGHT: 65 IN | DIASTOLIC BLOOD PRESSURE: 88 MMHG | TEMPERATURE: 98.5 F | WEIGHT: 182.4 LBS | BODY MASS INDEX: 30.39 KG/M2 | HEART RATE: 73 BPM | OXYGEN SATURATION: 98 % | SYSTOLIC BLOOD PRESSURE: 130 MMHG

## 2025-03-31 DIAGNOSIS — R09.81 HEAD CONGESTION: ICD-10-CM

## 2025-03-31 DIAGNOSIS — M30.1 CHURG-STRAUSS SYNDROME WITH LUNG INVOLVEMENT (HCC): ICD-10-CM

## 2025-03-31 DIAGNOSIS — J01.00 ACUTE NON-RECURRENT MAXILLARY SINUSITIS: Primary | ICD-10-CM

## 2025-03-31 DIAGNOSIS — J34.89 SINUS PRESSURE: ICD-10-CM

## 2025-03-31 DIAGNOSIS — D72.18 CHURG-STRAUSS SYNDROME WITH LUNG INVOLVEMENT (HCC): ICD-10-CM

## 2025-03-31 LAB
INFLUENZA A ANTIGEN, POC: NEGATIVE
INFLUENZA B ANTIGEN, POC: NEGATIVE
LOT EXPIRE DATE: NORMAL
LOT KIT NUMBER: NORMAL
SARS-COV-2, POC: NORMAL
VALID INTERNAL CONTROL: NORMAL
VENDOR AND KIT NAME POC: NORMAL

## 2025-03-31 PROCEDURE — 96372 THER/PROPH/DIAG INJ SC/IM: CPT | Performed by: INTERNAL MEDICINE

## 2025-03-31 PROCEDURE — G8417 CALC BMI ABV UP PARAM F/U: HCPCS | Performed by: INTERNAL MEDICINE

## 2025-03-31 PROCEDURE — 99213 OFFICE O/P EST LOW 20 MIN: CPT | Performed by: INTERNAL MEDICINE

## 2025-03-31 PROCEDURE — 1090F PRES/ABSN URINE INCON ASSESS: CPT | Performed by: INTERNAL MEDICINE

## 2025-03-31 PROCEDURE — 3075F SYST BP GE 130 - 139MM HG: CPT | Performed by: INTERNAL MEDICINE

## 2025-03-31 PROCEDURE — 3079F DIAST BP 80-89 MM HG: CPT | Performed by: INTERNAL MEDICINE

## 2025-03-31 PROCEDURE — 1036F TOBACCO NON-USER: CPT | Performed by: INTERNAL MEDICINE

## 2025-03-31 PROCEDURE — 1123F ACP DISCUSS/DSCN MKR DOCD: CPT | Performed by: INTERNAL MEDICINE

## 2025-03-31 PROCEDURE — 87428 SARSCOV & INF VIR A&B AG IA: CPT | Performed by: INTERNAL MEDICINE

## 2025-03-31 PROCEDURE — G8399 PT W/DXA RESULTS DOCUMENT: HCPCS | Performed by: INTERNAL MEDICINE

## 2025-03-31 PROCEDURE — 3017F COLORECTAL CA SCREEN DOC REV: CPT | Performed by: INTERNAL MEDICINE

## 2025-03-31 PROCEDURE — G8427 DOCREV CUR MEDS BY ELIG CLIN: HCPCS | Performed by: INTERNAL MEDICINE

## 2025-03-31 RX ORDER — METHYLPREDNISOLONE ACETATE 80 MG/ML
80 INJECTION, SUSPENSION INTRA-ARTICULAR; INTRALESIONAL; INTRAMUSCULAR; SOFT TISSUE ONCE
Status: COMPLETED | OUTPATIENT
Start: 2025-03-31 | End: 2025-03-31

## 2025-03-31 RX ORDER — PREDNISONE 10 MG/1
10 TABLET ORAL 2 TIMES DAILY
Qty: 10 TABLET | Refills: 0 | Status: SHIPPED | OUTPATIENT
Start: 2025-03-31 | End: 2025-04-05

## 2025-03-31 RX ORDER — CEFDINIR 300 MG/1
300 CAPSULE ORAL 2 TIMES DAILY
Qty: 14 CAPSULE | Refills: 0 | Status: SHIPPED | OUTPATIENT
Start: 2025-03-31 | End: 2025-04-07

## 2025-03-31 RX ADMIN — METHYLPREDNISOLONE ACETATE 80 MG: 80 INJECTION, SUSPENSION INTRA-ARTICULAR; INTRALESIONAL; INTRAMUSCULAR; SOFT TISSUE at 15:22

## 2025-03-31 ASSESSMENT — ENCOUNTER SYMPTOMS
ABDOMINAL PAIN: 0
SINUS PAIN: 1
SORE THROAT: 0
COUGH: 0
CHEST TIGHTNESS: 0
WHEEZING: 0
CONSTIPATION: 0
SINUS PRESSURE: 1
RHINORRHEA: 1

## 2025-03-31 NOTE — PROGRESS NOTES
Chief Complaint   Patient presents with    Congestion     Head congestion 1.5 weeks     Cough     1.5 weeks     Headache     History of presenting illness:  Krystin Johnson is a64 y.o. female who presents today for follow up on her chronic medical conditions as noted below.    Patient Active Problem List    Diagnosis Date Noted    Chronic sinusitis with recurrent bronchitis 02/21/2023     Priority: Medium    Eosinophilic bronchitis 02/21/2023     Priority: Medium    Esophageal dysphagia 01/25/2023     Priority: Medium    Hilar adenopathy 01/25/2023     Priority: Medium    Moderate COPD (chronic obstructive pulmonary disease) (HCC) 11/30/2022     Priority: Medium    Eosinophilic asthma 11/30/2022     Priority: Medium    Elevated IgE level 11/30/2022     Priority: Medium    Gastroesophageal reflux disease without esophagitis 10/03/2022     Priority: Medium    Hiatal hernia 10/03/2022     Priority: Medium    Long COVID 10/03/2022     Priority: Medium    Shortness of breath 08/12/2022     Priority: Medium    Reactive airway disease 06/29/2022     Priority: Medium    Chronic cough 06/29/2022     Priority: Medium    Churg-Angela syndrome with lung involvement (HCC) 07/17/2024    Poison ivy 10/16/2023    Chronic pain of both knees 08/01/2018    Facial skin lesion 08/01/2018    Right temporal headache 09/21/2017    Vision changes 09/21/2017    Pure hypercholesterolemia 09/10/2017    Vitamin D deficiency 09/10/2017    Essential hypertension 09/10/2017    Localized osteoporosis without current pathological fracture 09/10/2017     Overview Note:     2/14 Lspine -2.1/ hi neck -2.6; 1/2017 Lspine -1.8; hip neck -2.6  3/2021 hip neck -2.8; L spine -1.8      Rheumatoid arthritis involving both hands with positive rheumatoid factor (HCC) 09/10/2017     Overview Note:     Dx 2011 RA/ CCP ne      Primary insomnia 09/10/2017    Generalized anxiety disorder 09/10/2017    Reactive depression 09/10/2017     Past Medical History:

## 2025-04-01 RX ORDER — BUSPIRONE HYDROCHLORIDE 15 MG/1
15 TABLET ORAL 2 TIMES DAILY PRN
Qty: 60 TABLET | Refills: 2 | Status: SHIPPED | OUTPATIENT
Start: 2025-04-01

## 2025-04-01 NOTE — TELEPHONE ENCOUNTER
Krystin Johnson called to request a refill on her medication.      Last office visit : 3/31/2025   Next office visit : 4/15/2025     Requested Prescriptions     Pending Prescriptions Disp Refills    busPIRone (BUSPAR) 15 MG tablet 60 tablet 2     Sig: Take 15 mg by mouth 2 times daily as needed (anxiety)            Mary Izquierdo MA

## 2025-04-15 ENCOUNTER — OFFICE VISIT (OUTPATIENT)
Dept: INTERNAL MEDICINE | Age: 65
End: 2025-04-15
Payer: MEDICARE

## 2025-04-15 VITALS
BODY MASS INDEX: 30.72 KG/M2 | OXYGEN SATURATION: 98 % | SYSTOLIC BLOOD PRESSURE: 130 MMHG | HEIGHT: 65 IN | DIASTOLIC BLOOD PRESSURE: 88 MMHG | HEART RATE: 61 BPM | WEIGHT: 184.4 LBS

## 2025-04-15 DIAGNOSIS — I10 ESSENTIAL HYPERTENSION: ICD-10-CM

## 2025-04-15 DIAGNOSIS — F41.1 GENERALIZED ANXIETY DISORDER: ICD-10-CM

## 2025-04-15 DIAGNOSIS — G89.29 CHRONIC PAIN OF BOTH KNEES: Primary | ICD-10-CM

## 2025-04-15 DIAGNOSIS — M25.561 CHRONIC PAIN OF BOTH KNEES: Primary | ICD-10-CM

## 2025-04-15 DIAGNOSIS — M05.742 RHEUMATOID ARTHRITIS INVOLVING BOTH HANDS WITH POSITIVE RHEUMATOID FACTOR (HCC): ICD-10-CM

## 2025-04-15 DIAGNOSIS — M05.741 RHEUMATOID ARTHRITIS INVOLVING BOTH HANDS WITH POSITIVE RHEUMATOID FACTOR (HCC): ICD-10-CM

## 2025-04-15 DIAGNOSIS — F51.01 PRIMARY INSOMNIA: ICD-10-CM

## 2025-04-15 DIAGNOSIS — M25.562 CHRONIC PAIN OF BOTH KNEES: Primary | ICD-10-CM

## 2025-04-15 PROCEDURE — 3079F DIAST BP 80-89 MM HG: CPT | Performed by: INTERNAL MEDICINE

## 2025-04-15 PROCEDURE — 1036F TOBACCO NON-USER: CPT | Performed by: INTERNAL MEDICINE

## 2025-04-15 PROCEDURE — 1090F PRES/ABSN URINE INCON ASSESS: CPT | Performed by: INTERNAL MEDICINE

## 2025-04-15 PROCEDURE — 1123F ACP DISCUSS/DSCN MKR DOCD: CPT | Performed by: INTERNAL MEDICINE

## 2025-04-15 PROCEDURE — G8399 PT W/DXA RESULTS DOCUMENT: HCPCS | Performed by: INTERNAL MEDICINE

## 2025-04-15 PROCEDURE — G8417 CALC BMI ABV UP PARAM F/U: HCPCS | Performed by: INTERNAL MEDICINE

## 2025-04-15 PROCEDURE — G8427 DOCREV CUR MEDS BY ELIG CLIN: HCPCS | Performed by: INTERNAL MEDICINE

## 2025-04-15 PROCEDURE — 3075F SYST BP GE 130 - 139MM HG: CPT | Performed by: INTERNAL MEDICINE

## 2025-04-15 PROCEDURE — 3017F COLORECTAL CA SCREEN DOC REV: CPT | Performed by: INTERNAL MEDICINE

## 2025-04-15 PROCEDURE — 99213 OFFICE O/P EST LOW 20 MIN: CPT | Performed by: INTERNAL MEDICINE

## 2025-04-15 RX ORDER — HYDROCODONE BITARTRATE AND ACETAMINOPHEN 7.5; 325 MG/1; MG/1
1 TABLET ORAL DAILY PRN
Qty: 30 TABLET | Refills: 0 | Status: SHIPPED | OUTPATIENT
Start: 2025-04-15 | End: 2025-05-15

## 2025-04-15 RX ORDER — ZOLPIDEM TARTRATE 10 MG/1
10 TABLET ORAL NIGHTLY PRN
Qty: 30 TABLET | Refills: 2 | Status: SHIPPED | OUTPATIENT
Start: 2025-04-15 | End: 2025-05-15

## 2025-04-15 ASSESSMENT — ENCOUNTER SYMPTOMS
ABDOMINAL PAIN: 0
COUGH: 0
CONSTIPATION: 0
SHORTNESS OF BREATH: 1
WHEEZING: 0
BACK PAIN: 1
SORE THROAT: 0
CHEST TIGHTNESS: 0

## 2025-04-15 NOTE — PROGRESS NOTES
Chief Complaint   Patient presents with    3 Month Follow-Up     History of presenting illness:  Krystin Johnson is a64 y.o. female who presents today for follow up on her chronic medical conditions as noted below.      Patient Active Problem List    Diagnosis Date Noted    Chronic sinusitis with recurrent bronchitis 02/21/2023     Priority: Medium    Eosinophilic bronchitis 02/21/2023     Priority: Medium    Esophageal dysphagia 01/25/2023     Priority: Medium    Hilar adenopathy 01/25/2023     Priority: Medium    Moderate COPD (chronic obstructive pulmonary disease) (HCC) 11/30/2022     Priority: Medium    Eosinophilic asthma 11/30/2022     Priority: Medium    Elevated IgE level 11/30/2022     Priority: Medium    Gastroesophageal reflux disease without esophagitis 10/03/2022     Priority: Medium    Hiatal hernia 10/03/2022     Priority: Medium    Long COVID 10/03/2022     Priority: Medium    Shortness of breath 08/12/2022     Priority: Medium    Reactive airway disease 06/29/2022     Priority: Medium    Chronic cough 06/29/2022     Priority: Medium    Churg-Angela syndrome with lung involvement (HCC) 07/17/2024    Poison ivy 10/16/2023    Chronic pain of both knees 08/01/2018    Facial skin lesion 08/01/2018    Right temporal headache 09/21/2017    Vision changes 09/21/2017    Pure hypercholesterolemia 09/10/2017    Vitamin D deficiency 09/10/2017    Essential hypertension 09/10/2017    Localized osteoporosis without current pathological fracture 09/10/2017     Overview Note:     2/14 Lspine -2.1/ hi neck -2.6; 1/2017 Lspine -1.8; hip neck -2.6  3/2021 hip neck -2.8; L spine -1.8      Rheumatoid arthritis involving both hands with positive rheumatoid factor (HCC) 09/10/2017     Overview Note:     Dx 2011 RA/ CCP ne      Primary insomnia 09/10/2017    Generalized anxiety disorder 09/10/2017    Reactive depression 09/10/2017     Past Medical History:   Diagnosis Date    Chronic sinusitis     Cough     chronic

## 2025-04-22 ENCOUNTER — OFFICE VISIT (OUTPATIENT)
Age: 65
End: 2025-04-22
Payer: MEDICARE

## 2025-04-22 VITALS — BODY MASS INDEX: 30.66 KG/M2 | WEIGHT: 184 LBS | HEIGHT: 65 IN

## 2025-04-22 DIAGNOSIS — S52.532D CLOSED COLLES' FRACTURE OF LEFT RADIUS WITH ROUTINE HEALING, SUBSEQUENT ENCOUNTER: Primary | ICD-10-CM

## 2025-04-22 PROCEDURE — G8399 PT W/DXA RESULTS DOCUMENT: HCPCS | Performed by: PHYSICIAN ASSISTANT

## 2025-04-22 PROCEDURE — 1090F PRES/ABSN URINE INCON ASSESS: CPT | Performed by: PHYSICIAN ASSISTANT

## 2025-04-22 PROCEDURE — G8427 DOCREV CUR MEDS BY ELIG CLIN: HCPCS | Performed by: PHYSICIAN ASSISTANT

## 2025-04-22 PROCEDURE — 3017F COLORECTAL CA SCREEN DOC REV: CPT | Performed by: PHYSICIAN ASSISTANT

## 2025-04-22 PROCEDURE — G8417 CALC BMI ABV UP PARAM F/U: HCPCS | Performed by: PHYSICIAN ASSISTANT

## 2025-04-22 PROCEDURE — 1123F ACP DISCUSS/DSCN MKR DOCD: CPT | Performed by: PHYSICIAN ASSISTANT

## 2025-04-22 PROCEDURE — 1036F TOBACCO NON-USER: CPT | Performed by: PHYSICIAN ASSISTANT

## 2025-04-22 PROCEDURE — 99212 OFFICE O/P EST SF 10 MIN: CPT | Performed by: PHYSICIAN ASSISTANT

## 2025-04-22 RX ORDER — CELECOXIB 200 MG/1
200 CAPSULE ORAL DAILY
Qty: 30 CAPSULE | Refills: 1 | Status: SHIPPED | OUTPATIENT
Start: 2025-04-22

## 2025-04-22 RX ORDER — ERGOCALCIFEROL 1.25 MG/1
CAPSULE, LIQUID FILLED ORAL
Qty: 4 CAPSULE | Refills: 5 | Status: SHIPPED | OUTPATIENT
Start: 2025-04-22

## 2025-04-22 ASSESSMENT — ENCOUNTER SYMPTOMS: SHORTNESS OF BREATH: 0

## 2025-04-22 NOTE — TELEPHONE ENCOUNTER
Krystin Johnson called to request a refill on her medication.      Last office visit : 4/15/2025   Next office visit : 7/16/2025     Requested Prescriptions     Pending Prescriptions Disp Refills    vitamin D (ERGOCALCIFEROL) 1.25 MG (93355 UT) CAPS capsule [Pharmacy Med Name: VITAMIN D2 50,000IU (ERGO) CAP RX] 4 capsule 5     Sig: TAKE 1 CAPSULE BY MOUTH EVERY WEDNESDAY April NIXON Kovacs

## 2025-04-22 NOTE — PROGRESS NOTES
[facility]  [facility]  [address] [facility] [place] [address]  Dept: [phone]  Dept Fax: [phone]  Loc: [phone]     Krystin Johnson (:  1960) is a 64 y.o. female,Established patient, here for evaluation of the following:    Chief Complaint   Patient presents with    Follow-up     L wrist  DOI: 25           Subjective   Patient is a 64-year-old  female came to the clinic for follow-up of her left wrist fracture.  She sustained a distal radius fracture on 2025.  She reports she is doing well but her thumb is hurting her more often than the wrist is.  She has been wearing the wrist brace most of the time.  She does take it off on occasion.  She does not complain about wrist pain today.        Allergies   Allergen Reactions    Morphine And Codeine Headaches     Past Surgical History:   Procedure Laterality Date    ADENOIDECTOMY      BREAST BIOPSY      BREAST BIOPSY      BRONCHOSCOPY N/A 2023    BRONCHOSCOPY ENDOBRONCHIAL ULTRASOUND performed by Gabriella Aguirre MD at [facility]    COLONOSCOPY  2015    Dr Manning-Diverticulosis, 5 yr recall    COLONOSCOPY N/A 02/10/2022    Dr BERNABE Torres-Questionable lipoma-BCM, 5 yr recall    ESOPHAGEAL DILATATION  2023    Dr BERNABE Torres-w/[id number]-Pfclfyoexjai, benign appearing stricture in the distal esophagus,  small gastric inlet patch in the upper esophagus, no Cole's, HH-Gastritis, no h pylori    OVARY REMOVAL Right     SINUS ENDOSCOPY N/A 2023    SINUS ENDOSCOPY MAXILLARY ANTROSTOMY WITH TISSUE REMOVAL ALONG WITH MAXILLARY & ANTERIOR ETHMOIDECTOMY & POLYPS REMOVAL WITH NAVIGATION performed by Aaron Mueller MD at [facility] OR    TONSILLECTOMY      UPPER GASTROINTESTINAL ENDOSCOPY N/A 2023    Dr BERNABE Torres-allan/[id number]-Shgjkvplpfyd, benign appearing stricture in the distal esophagus,  small gastric inlet patch in the upper esophagus, no Cole's,

## 2025-05-27 RX ORDER — METOPROLOL SUCCINATE 50 MG/1
50 TABLET, EXTENDED RELEASE ORAL DAILY
Qty: 90 TABLET | Refills: 0 | Status: SHIPPED | OUTPATIENT
Start: 2025-05-27

## 2025-05-27 RX ORDER — IBANDRONATE SODIUM 150 MG/1
150 TABLET, FILM COATED ORAL
Qty: 3 TABLET | Refills: 0 | Status: SHIPPED | OUTPATIENT
Start: 2025-05-27

## 2025-05-27 NOTE — TELEPHONE ENCOUNTER
Krystin Johnson called to request a refill on her medication.      Last office visit : 4/15/2025   Next office visit : 7/16/2025     Requested Prescriptions     Pending Prescriptions Disp Refills    metoprolol succinate (TOPROL XL) 50 MG extended release tablet [Pharmacy Med Name: METOPROLOL ER SUCCINATE 50MG TABS] 90 tablet 0     Sig: TAKE 1 TABLET BY MOUTH DAILY    ibandronate (BONIVA) 150 MG tablet [Pharmacy Med Name: IBANDRONATE SODIUM 150MG TABLETS] 3 tablet 0     Sig: TAKE 1 TABLET BY MOUTH EVERY 30 DAYS            Mary Izquierdo MA

## 2025-06-24 ENCOUNTER — PATIENT MESSAGE (OUTPATIENT)
Dept: INTERNAL MEDICINE | Age: 65
End: 2025-06-24

## 2025-06-24 DIAGNOSIS — M25.562 CHRONIC PAIN OF BOTH KNEES: ICD-10-CM

## 2025-06-24 DIAGNOSIS — M25.561 CHRONIC PAIN OF BOTH KNEES: ICD-10-CM

## 2025-06-24 DIAGNOSIS — G89.29 CHRONIC PAIN OF BOTH KNEES: ICD-10-CM

## 2025-06-24 RX ORDER — HYDROCODONE BITARTRATE AND ACETAMINOPHEN 7.5; 325 MG/1; MG/1
1 TABLET ORAL DAILY PRN
Qty: 30 TABLET | Refills: 0 | Status: SHIPPED | OUTPATIENT
Start: 2025-06-24 | End: 2025-06-24 | Stop reason: CLARIF

## 2025-06-24 RX ORDER — ROSUVASTATIN CALCIUM 10 MG/1
TABLET, COATED ORAL
Qty: 90 TABLET | Refills: 1 | Status: SHIPPED | OUTPATIENT
Start: 2025-06-24

## 2025-06-24 NOTE — TELEPHONE ENCOUNTER
Last filled 04/15/2025   Krystin Johnson called to request a refill on her medication.      Last office visit : 4/15/2025   Next office visit : 7/16/2025     Requested Prescriptions      No prescriptions requested or ordered in this encounter            Mary Izquierdo MA

## 2025-06-24 NOTE — TELEPHONE ENCOUNTER
Krystin Johnson called to request a refill on her medication.      Last office visit : 4/15/2025   Next office visit : 7/16/2025     Requested Prescriptions     Pending Prescriptions Disp Refills    rosuvastatin (CRESTOR) 10 MG tablet 90 tablet 1     Sig: TAKE 1 TABLET BY MOUTH EVERY NIGHT AT BEDTIME            Mary Izquierdo MA

## 2025-07-14 DIAGNOSIS — Z00.00 WELCOME TO MEDICARE PREVENTIVE VISIT: ICD-10-CM

## 2025-07-14 DIAGNOSIS — D72.18 CHURG-STRAUSS SYNDROME WITH LUNG INVOLVEMENT (HCC): ICD-10-CM

## 2025-07-14 DIAGNOSIS — E55.9 VITAMIN D DEFICIENCY: ICD-10-CM

## 2025-07-14 DIAGNOSIS — M25.561 CHRONIC PAIN OF BOTH KNEES: ICD-10-CM

## 2025-07-14 DIAGNOSIS — M05.742 RHEUMATOID ARTHRITIS INVOLVING BOTH HANDS WITH POSITIVE RHEUMATOID FACTOR (HCC): ICD-10-CM

## 2025-07-14 DIAGNOSIS — M25.562 CHRONIC PAIN OF BOTH KNEES: ICD-10-CM

## 2025-07-14 DIAGNOSIS — E78.00 PURE HYPERCHOLESTEROLEMIA: ICD-10-CM

## 2025-07-14 DIAGNOSIS — I10 ESSENTIAL HYPERTENSION: ICD-10-CM

## 2025-07-14 DIAGNOSIS — F51.01 PRIMARY INSOMNIA: ICD-10-CM

## 2025-07-14 DIAGNOSIS — G89.29 CHRONIC PAIN OF BOTH KNEES: ICD-10-CM

## 2025-07-14 DIAGNOSIS — M05.741 RHEUMATOID ARTHRITIS INVOLVING BOTH HANDS WITH POSITIVE RHEUMATOID FACTOR (HCC): ICD-10-CM

## 2025-07-14 DIAGNOSIS — M30.1 CHURG-STRAUSS SYNDROME WITH LUNG INVOLVEMENT (HCC): ICD-10-CM

## 2025-07-14 DIAGNOSIS — M81.6 LOCALIZED OSTEOPOROSIS WITHOUT CURRENT PATHOLOGICAL FRACTURE: ICD-10-CM

## 2025-07-14 LAB
ALBUMIN SERPL-MCNC: 4.3 G/DL (ref 3.5–5.2)
ALP SERPL-CCNC: 122 U/L (ref 35–104)
ALT SERPL-CCNC: 37 U/L (ref 10–35)
ANION GAP SERPL CALCULATED.3IONS-SCNC: 9 MMOL/L (ref 8–16)
AST SERPL-CCNC: 33 U/L (ref 10–35)
BASOPHILS # BLD: 0.1 K/UL (ref 0–0.2)
BASOPHILS NFR BLD: 0.9 % (ref 0–1)
BILIRUB SERPL-MCNC: 0.9 MG/DL (ref 0.2–1.2)
BUN SERPL-MCNC: 21 MG/DL (ref 8–23)
CALCIUM SERPL-MCNC: 9.4 MG/DL (ref 8.8–10.2)
CHLORIDE SERPL-SCNC: 106 MMOL/L (ref 98–107)
CHOLEST SERPL-MCNC: 175 MG/DL (ref 0–199)
CO2 SERPL-SCNC: 24 MMOL/L (ref 22–29)
CREAT SERPL-MCNC: 0.9 MG/DL (ref 0.5–0.9)
EOSINOPHIL # BLD: 0.3 K/UL (ref 0–0.6)
EOSINOPHIL NFR BLD: 3.7 % (ref 0–5)
ERYTHROCYTE [DISTWIDTH] IN BLOOD BY AUTOMATED COUNT: 14.6 % (ref 11.5–14.5)
GLUCOSE SERPL-MCNC: 94 MG/DL (ref 70–99)
HCT VFR BLD AUTO: 40.5 % (ref 37–47)
HDLC SERPL-MCNC: 75 MG/DL (ref 40–60)
HGB BLD-MCNC: 13.3 G/DL (ref 12–16)
IMM GRANULOCYTES # BLD: 0 K/UL
LDLC SERPL CALC-MCNC: 81 MG/DL
LYMPHOCYTES # BLD: 1.7 K/UL (ref 1.1–4.5)
LYMPHOCYTES NFR BLD: 23.4 % (ref 20–40)
MCH RBC QN AUTO: 29 PG (ref 27–31)
MCHC RBC AUTO-ENTMCNC: 32.8 G/DL (ref 33–37)
MCV RBC AUTO: 88.2 FL (ref 81–99)
MONOCYTES # BLD: 0.5 K/UL (ref 0–0.9)
MONOCYTES NFR BLD: 7.1 % (ref 0–10)
NEUTROPHILS # BLD: 4.6 K/UL (ref 1.5–7.5)
NEUTS SEG NFR BLD: 64.6 % (ref 50–65)
PLATELET # BLD AUTO: 258 K/UL (ref 130–400)
PMV BLD AUTO: 9.9 FL (ref 9.4–12.3)
POTASSIUM SERPL-SCNC: 4 MMOL/L (ref 3.5–5.1)
PROT SERPL-MCNC: 6.9 G/DL (ref 6.4–8.3)
RBC # BLD AUTO: 4.59 M/UL (ref 4.2–5.4)
SODIUM SERPL-SCNC: 139 MMOL/L (ref 136–145)
TRIGL SERPL-MCNC: 95 MG/DL (ref 0–149)
TSH SERPL DL<=0.005 MIU/L-ACNC: 1.23 UIU/ML (ref 0.27–4.2)
WBC # BLD AUTO: 7.1 K/UL (ref 4.8–10.8)

## 2025-07-16 ENCOUNTER — OFFICE VISIT (OUTPATIENT)
Dept: INTERNAL MEDICINE | Age: 65
End: 2025-07-16
Payer: MEDICARE

## 2025-07-16 VITALS
BODY MASS INDEX: 31.99 KG/M2 | HEIGHT: 65 IN | DIASTOLIC BLOOD PRESSURE: 88 MMHG | WEIGHT: 192 LBS | SYSTOLIC BLOOD PRESSURE: 130 MMHG | OXYGEN SATURATION: 97 % | HEART RATE: 58 BPM

## 2025-07-16 DIAGNOSIS — D72.18 CHURG-STRAUSS SYNDROME WITH LUNG INVOLVEMENT (HCC): ICD-10-CM

## 2025-07-16 DIAGNOSIS — F51.01 PRIMARY INSOMNIA: ICD-10-CM

## 2025-07-16 DIAGNOSIS — E55.9 VITAMIN D DEFICIENCY: ICD-10-CM

## 2025-07-16 DIAGNOSIS — M05.741 RHEUMATOID ARTHRITIS INVOLVING BOTH HANDS WITH POSITIVE RHEUMATOID FACTOR (HCC): ICD-10-CM

## 2025-07-16 DIAGNOSIS — M30.1 CHURG-STRAUSS SYNDROME WITH LUNG INVOLVEMENT (HCC): ICD-10-CM

## 2025-07-16 DIAGNOSIS — I10 ESSENTIAL HYPERTENSION: Primary | ICD-10-CM

## 2025-07-16 DIAGNOSIS — Z12.31 ENCOUNTER FOR SCREENING MAMMOGRAM FOR MALIGNANT NEOPLASM OF BREAST: ICD-10-CM

## 2025-07-16 DIAGNOSIS — F41.1 GENERALIZED ANXIETY DISORDER: ICD-10-CM

## 2025-07-16 DIAGNOSIS — M05.742 RHEUMATOID ARTHRITIS INVOLVING BOTH HANDS WITH POSITIVE RHEUMATOID FACTOR (HCC): ICD-10-CM

## 2025-07-16 PROCEDURE — 3075F SYST BP GE 130 - 139MM HG: CPT | Performed by: INTERNAL MEDICINE

## 2025-07-16 PROCEDURE — G8427 DOCREV CUR MEDS BY ELIG CLIN: HCPCS | Performed by: INTERNAL MEDICINE

## 2025-07-16 PROCEDURE — 99214 OFFICE O/P EST MOD 30 MIN: CPT | Performed by: INTERNAL MEDICINE

## 2025-07-16 PROCEDURE — G8399 PT W/DXA RESULTS DOCUMENT: HCPCS | Performed by: INTERNAL MEDICINE

## 2025-07-16 PROCEDURE — 3017F COLORECTAL CA SCREEN DOC REV: CPT | Performed by: INTERNAL MEDICINE

## 2025-07-16 PROCEDURE — 1090F PRES/ABSN URINE INCON ASSESS: CPT | Performed by: INTERNAL MEDICINE

## 2025-07-16 PROCEDURE — 1036F TOBACCO NON-USER: CPT | Performed by: INTERNAL MEDICINE

## 2025-07-16 PROCEDURE — G8417 CALC BMI ABV UP PARAM F/U: HCPCS | Performed by: INTERNAL MEDICINE

## 2025-07-16 PROCEDURE — 1123F ACP DISCUSS/DSCN MKR DOCD: CPT | Performed by: INTERNAL MEDICINE

## 2025-07-16 PROCEDURE — 3079F DIAST BP 80-89 MM HG: CPT | Performed by: INTERNAL MEDICINE

## 2025-07-16 ASSESSMENT — ENCOUNTER SYMPTOMS
WHEEZING: 0
SORE THROAT: 0
ABDOMINAL PAIN: 0
CHEST TIGHTNESS: 0
COUGH: 1
CONSTIPATION: 0

## 2025-07-16 NOTE — PROGRESS NOTES
Respiratory:  Positive for cough. Negative for chest tightness and wheezing.    Cardiovascular:  Negative for chest pain, palpitations and leg swelling.   Gastrointestinal:  Negative for abdominal pain and constipation.   Genitourinary:  Negative for dysuria and urgency.   Musculoskeletal: Negative.  Negative for arthralgias.   Skin:  Negative for rash.   Neurological:  Negative for dizziness and headaches.   Psychiatric/Behavioral: Negative.       Vitals:    07/16/25 0957   BP: 130/88   Pulse: 58   SpO2: 97%   Weight: 87.1 kg (192 lb)   Height: 1.651 m (5' 5\")     Body mass index is 31.95 kg/m².    Physical Exam  Constitutional:       Appearance: She is well-developed.   HENT:      Mouth/Throat:      Pharynx: No oropharyngeal exudate.   Eyes:      Conjunctiva/sclera: Conjunctivae normal.      Pupils: Pupils are equal, round, and reactive to light.   Neck:      Thyroid: No thyromegaly.      Vascular: No JVD.   Cardiovascular:      Rate and Rhythm: Normal rate.      Heart sounds: Normal heart sounds. No murmur heard.  Pulmonary:      Effort: No respiratory distress.      Breath sounds: Rhonchi present. No wheezing.   Chest:      Chest wall: No tenderness.   Abdominal:      General: Bowel sounds are normal.      Palpations: Abdomen is soft.   Musculoskeletal:      Cervical back: Neck supple.   Lymphadenopathy:      Cervical: No cervical adenopathy.   Skin:     Findings: No rash.   Neurological:      Mental Status: She is oriented to person, place, and time.         Lab Review   Orders Only on 07/14/2025   Component Date Value    WBC 07/14/2025 7.1     RBC 07/14/2025 4.59     Hemoglobin 07/14/2025 13.3     Hematocrit 07/14/2025 40.5     MCV 07/14/2025 88.2     MCH 07/14/2025 29.0     MCHC 07/14/2025 32.8 (L)     RDW 07/14/2025 14.6 (H)     Platelets 07/14/2025 258     MPV 07/14/2025 9.9     Neutrophils % 07/14/2025 64.6     Lymphocytes % 07/14/2025 23.4     Monocytes % 07/14/2025 7.1     Eosinophils % 07/14/2025 3.7

## 2025-07-30 RX ORDER — BUSPIRONE HYDROCHLORIDE 15 MG/1
15 TABLET ORAL 2 TIMES DAILY PRN
Qty: 60 TABLET | Refills: 2 | Status: SHIPPED | OUTPATIENT
Start: 2025-07-30

## 2025-07-30 NOTE — TELEPHONE ENCOUNTER
Krystin Johnson called to request a refill on her medication.      Last office visit : 7/16/2025   Next office visit : 1/16/2026     Requested Prescriptions     Pending Prescriptions Disp Refills    busPIRone (BUSPAR) 15 MG tablet 60 tablet 2     Sig: Take 15 mg by mouth 2 times daily as needed (anxiety)            April Klemme MA

## 2025-08-11 ENCOUNTER — TELEPHONE (OUTPATIENT)
Dept: INTERNAL MEDICINE | Age: 65
End: 2025-08-11

## 2025-08-11 DIAGNOSIS — M81.6 LOCALIZED OSTEOPOROSIS WITHOUT CURRENT PATHOLOGICAL FRACTURE: Primary | ICD-10-CM

## 2025-08-11 DIAGNOSIS — R92.8 ABNORMALITY OF RIGHT BREAST ON SCREENING MAMMOGRAM: Primary | ICD-10-CM

## 2025-08-13 DIAGNOSIS — R92.8 ABNORMALITY OF RIGHT BREAST ON SCREENING MAMMOGRAM: ICD-10-CM

## 2025-08-21 RX ORDER — HYDROCHLOROTHIAZIDE 12.5 MG/1
12.5 CAPSULE ORAL EVERY MORNING
Qty: 90 CAPSULE | Refills: 1 | Status: SHIPPED | OUTPATIENT
Start: 2025-08-21

## 2025-08-21 RX ORDER — ROSUVASTATIN CALCIUM 10 MG/1
10 TABLET, COATED ORAL NIGHTLY
Qty: 90 TABLET | Refills: 1 | Status: SHIPPED | OUTPATIENT
Start: 2025-08-21

## 2025-08-21 RX ORDER — FLUOXETINE HYDROCHLORIDE 40 MG/1
40 CAPSULE ORAL DAILY
Qty: 90 CAPSULE | Refills: 1 | Status: SHIPPED | OUTPATIENT
Start: 2025-08-21

## 2025-08-21 RX ORDER — ALBUTEROL SULFATE 90 UG/1
INHALANT RESPIRATORY (INHALATION)
Qty: 8.5 G | Refills: 1 | Status: SHIPPED | OUTPATIENT
Start: 2025-08-21

## 2025-08-22 RX ORDER — BUSPIRONE HYDROCHLORIDE 15 MG/1
15 TABLET ORAL 2 TIMES DAILY PRN
Qty: 60 TABLET | Refills: 2 | Status: SHIPPED | OUTPATIENT
Start: 2025-08-22

## 2025-08-26 RX ORDER — METOPROLOL SUCCINATE 50 MG/1
50 TABLET, EXTENDED RELEASE ORAL DAILY
Qty: 90 TABLET | Refills: 1 | Status: SHIPPED | OUTPATIENT
Start: 2025-08-26

## 2025-08-26 RX ORDER — IBANDRONATE SODIUM 150 MG/1
150 TABLET, FILM COATED ORAL
Qty: 3 TABLET | Refills: 1 | Status: SHIPPED | OUTPATIENT
Start: 2025-08-26

## 2025-08-29 ENCOUNTER — TELEPHONE (OUTPATIENT)
Age: 65
End: 2025-08-29

## (undated) DEVICE — SOLUTION IV IRRIG POUR BRL 0.9% SODIUM CHL 2F7124

## (undated) DEVICE — BLADE 1884012 RAD12 5PK CURVED 4MM: Brand: RAD®

## (undated) DEVICE — DBD-PACK,EENT,SIRUS,PK I: Brand: MEDLINE

## (undated) DEVICE — ENDO KIT,LOURDES HOSPITAL: Brand: MEDLINE INDUSTRIES, INC.

## (undated) DEVICE — TUBING, SUCTION, 1/4" X 20', STRAIGHT: Brand: MEDLINE INDUSTRIES, INC.

## (undated) DEVICE — FORCEPS BX 240CM 2.4MM L NDL RAD JAW 4 M00513334

## (undated) DEVICE — SYRINGE MED 20ML STD CLR PLAS LUERSLIP TIP N CTRL DISP

## (undated) DEVICE — GAUZE,SPONGE,4"X4",8PLY,STRL,LF,10/TRAY: Brand: MEDLINE

## (undated) DEVICE — TUBE ET 8.5MM NSL ORAL BASIC CUF INTMED MURPHY EYE RADPQ

## (undated) DEVICE — GREENLIGHT™ SINGLE PATIENT USE ISO GREEN STANDARD STAINLESS STEEL LARYNGOSCOPE BLADE: Brand: GREENLIGHT™

## (undated) DEVICE — NEEDLE 25GA VIZISHOT 2

## (undated) DEVICE — GLOVE SURG SZ 7 CRM LTX FREE POLYISOPRENE POLYMER BEAD ANTI

## (undated) DEVICE — SINGLE USE BIOPSY VALVE MAJ-210: Brand: SINGLE USE BIOPSY VALVE (STERILE)

## (undated) DEVICE — 4.0 MM ENT PRECISION CUTTER  ANGLE AGG. 12 DEGREE  ESSX/HUMMER 4 11.0 CM WORKING LENGTH

## (undated) DEVICE — LARYNGOSCOPE BLDE MAC HNDL M SZ 35 ST CURAPLEX CURAVIEW LED

## (undated) DEVICE — DRESSING ABSRB IS 4X4

## (undated) DEVICE — CANNULA NSL AD L7FT DIV O2 CO2 W/ M LUERLOCK TRMPT CONN

## (undated) DEVICE — FORCEPS BX L100CM DIA1.8MM WRK CHN 2MM PULM S STL RAD JAW 4

## (undated) DEVICE — BITE BLOCK ENDOSCP AD 60 FR W/ ADJ STRP PLAS GRN BLOX

## (undated) DEVICE — SINGLE USE SUCTION VALVE MAJ-209: Brand: SINGLE USE SUCTION VALVE (STERILE)

## (undated) DEVICE — DISPOSABLE IRRIGATION CASSETTE: Brand: CORE HUMMER

## (undated) DEVICE — DEFOGGER!" ANTI FOG KIT: Brand: DEROYAL

## (undated) DEVICE — CODMAN® SURGICAL PATTIES 1/2" X 3" (1.27CM X 7.62CM): Brand: CODMAN®

## (undated) DEVICE — TUBE ET 7MM NSL ORAL BASIC CUF INTMED MURPHY EYE RADPQ MRK

## (undated) DEVICE — 4.0 MM ENT PRECISION CUTTER AGGRESSIVE ESSX/HUMMER 4 11.0 CM WORKING LENGTH

## (undated) DEVICE — SINU FOAM: Brand: SINU-FOAM

## (undated) DEVICE — SYRINGE MED 10ML TRNSLUC BRL PLUNG BLK MRK POLYPR CTRL

## (undated) DEVICE — SINGLE PORT MANIFOLD: Brand: NEPTUNE 2

## (undated) DEVICE — ENDO KIT: Brand: MEDLINE INDUSTRIES, INC.